# Patient Record
Sex: FEMALE | Race: WHITE | NOT HISPANIC OR LATINO | Employment: UNEMPLOYED | URBAN - METROPOLITAN AREA
[De-identification: names, ages, dates, MRNs, and addresses within clinical notes are randomized per-mention and may not be internally consistent; named-entity substitution may affect disease eponyms.]

---

## 2017-01-23 ENCOUNTER — APPOINTMENT (OUTPATIENT)
Dept: PREADMISSION TESTING | Facility: HOSPITAL | Age: 32
End: 2017-01-23

## 2017-01-23 VITALS — HEIGHT: 66 IN | BODY MASS INDEX: 33.94 KG/M2 | WEIGHT: 211.2 LBS

## 2017-01-23 LAB
ANION GAP SERPL CALCULATED.3IONS-SCNC: 9 MMOL/L (ref 3–11)
BUN BLD-MCNC: 18 MG/DL (ref 9–23)
BUN/CREAT SERPL: 30 (ref 7–25)
CALCIUM SPEC-SCNC: 9.2 MG/DL (ref 8.7–10.4)
CHLORIDE SERPL-SCNC: 106 MMOL/L (ref 99–109)
CO2 SERPL-SCNC: 32 MMOL/L (ref 20–31)
CREAT BLD-MCNC: 0.6 MG/DL (ref 0.6–1.3)
DEPRECATED RDW RBC AUTO: 43.4 FL (ref 37–54)
ERYTHROCYTE [DISTWIDTH] IN BLOOD BY AUTOMATED COUNT: 13.1 % (ref 11.3–14.5)
GFR SERPL CREATININE-BSD FRML MDRD: 116 ML/MIN/1.73
GLUCOSE BLD-MCNC: 94 MG/DL (ref 70–100)
HCT VFR BLD AUTO: 39.8 % (ref 34.5–44)
HGB BLD-MCNC: 14.1 G/DL (ref 11.5–15.5)
MAGNESIUM SERPL-MCNC: 1.7 MG/DL (ref 1.3–2.7)
MCH RBC QN AUTO: 32.3 PG (ref 27–31)
MCHC RBC AUTO-ENTMCNC: 35.4 G/DL (ref 32–36)
MCV RBC AUTO: 91.1 FL (ref 80–99)
PHOSPHATE SERPL-MCNC: 3.7 MG/DL (ref 2.4–5.1)
PLATELET # BLD AUTO: 183 10*3/MM3 (ref 150–450)
PMV BLD AUTO: 9.2 FL (ref 6–12)
POTASSIUM BLD-SCNC: 4.1 MMOL/L (ref 3.5–5.5)
PREALB SERPL-MCNC: 22.8 MG/DL (ref 10–40)
RBC # BLD AUTO: 4.37 10*6/MM3 (ref 3.89–5.14)
SODIUM BLD-SCNC: 147 MMOL/L (ref 132–146)
WBC NRBC COR # BLD: 4.03 10*3/MM3 (ref 3.5–10.8)

## 2017-01-23 PROCEDURE — 36415 COLL VENOUS BLD VENIPUNCTURE: CPT

## 2017-01-23 PROCEDURE — 85027 COMPLETE CBC AUTOMATED: CPT | Performed by: PLASTIC SURGERY

## 2017-01-23 PROCEDURE — 83735 ASSAY OF MAGNESIUM: CPT | Performed by: PLASTIC SURGERY

## 2017-01-23 PROCEDURE — 80048 BASIC METABOLIC PNL TOTAL CA: CPT | Performed by: PLASTIC SURGERY

## 2017-01-23 PROCEDURE — 84134 ASSAY OF PREALBUMIN: CPT | Performed by: PLASTIC SURGERY

## 2017-01-23 PROCEDURE — 84100 ASSAY OF PHOSPHORUS: CPT | Performed by: PLASTIC SURGERY

## 2017-01-25 ENCOUNTER — ANESTHESIA EVENT (OUTPATIENT)
Dept: PERIOP | Facility: HOSPITAL | Age: 32
End: 2017-01-25

## 2017-01-26 ENCOUNTER — ANESTHESIA (OUTPATIENT)
Dept: PERIOP | Facility: HOSPITAL | Age: 32
End: 2017-01-26

## 2017-01-26 PROBLEM — Z41.1 ENCOUNTER FOR SURGERY FOR COSMETIC DEFORMITY: Status: ACTIVE | Noted: 2017-01-26

## 2017-01-26 PROCEDURE — 25010000002 DEXAMETHASONE PER 1 MG: Performed by: NURSE ANESTHETIST, CERTIFIED REGISTERED

## 2017-01-26 PROCEDURE — 25010000002 PROPOFOL 10 MG/ML EMULSION: Performed by: NURSE ANESTHETIST, CERTIFIED REGISTERED

## 2017-01-26 PROCEDURE — 25010000003 CEFAZOLIN IN DEXTROSE 2-4 GM/100ML-% SOLUTION: Performed by: PLASTIC SURGERY

## 2017-01-26 PROCEDURE — 25010000002 ONDANSETRON PER 1 MG: Performed by: NURSE ANESTHETIST, CERTIFIED REGISTERED

## 2017-01-26 PROCEDURE — 25010000002 HYDROMORPHONE PER 4 MG: Performed by: NURSE ANESTHETIST, CERTIFIED REGISTERED

## 2017-01-26 PROCEDURE — 25010000002 FENTANYL CITRATE (PF) 100 MCG/2ML SOLUTION: Performed by: NURSE ANESTHETIST, CERTIFIED REGISTERED

## 2017-01-26 PROCEDURE — 25010000002 PROPOFOL 1000 MG/ML EMULSION: Performed by: NURSE ANESTHETIST, CERTIFIED REGISTERED

## 2017-01-26 RX ORDER — ROCURONIUM BROMIDE 10 MG/ML
INJECTION, SOLUTION INTRAVENOUS AS NEEDED
Status: DISCONTINUED | OUTPATIENT
Start: 2017-01-26 | End: 2017-01-26 | Stop reason: SURG

## 2017-01-26 RX ORDER — FENTANYL CITRATE 50 UG/ML
INJECTION, SOLUTION INTRAMUSCULAR; INTRAVENOUS AS NEEDED
Status: DISCONTINUED | OUTPATIENT
Start: 2017-01-26 | End: 2017-01-26 | Stop reason: SURG

## 2017-01-26 RX ORDER — PROPOFOL 10 MG/ML
VIAL (ML) INTRAVENOUS AS NEEDED
Status: DISCONTINUED | OUTPATIENT
Start: 2017-01-26 | End: 2017-01-26 | Stop reason: SURG

## 2017-01-26 RX ORDER — LIDOCAINE HYDROCHLORIDE 10 MG/ML
INJECTION, SOLUTION EPIDURAL; INFILTRATION; INTRACAUDAL; PERINEURAL AS NEEDED
Status: DISCONTINUED | OUTPATIENT
Start: 2017-01-26 | End: 2017-01-26 | Stop reason: SURG

## 2017-01-26 RX ORDER — ONDANSETRON 2 MG/ML
INJECTION INTRAMUSCULAR; INTRAVENOUS AS NEEDED
Status: DISCONTINUED | OUTPATIENT
Start: 2017-01-26 | End: 2017-01-26 | Stop reason: SURG

## 2017-01-26 RX ORDER — HYDROMORPHONE HCL 110MG/55ML
PATIENT CONTROLLED ANALGESIA SYRINGE INTRAVENOUS AS NEEDED
Status: DISCONTINUED | OUTPATIENT
Start: 2017-01-26 | End: 2017-01-26 | Stop reason: SURG

## 2017-01-26 RX ORDER — DEXAMETHASONE SODIUM PHOSPHATE 4 MG/ML
INJECTION, SOLUTION INTRA-ARTICULAR; INTRALESIONAL; INTRAMUSCULAR; INTRAVENOUS; SOFT TISSUE AS NEEDED
Status: DISCONTINUED | OUTPATIENT
Start: 2017-01-26 | End: 2017-01-26 | Stop reason: SURG

## 2017-01-26 RX ADMIN — CEFAZOLIN SODIUM 2 G: 2 INJECTION, SOLUTION INTRAVENOUS at 10:42

## 2017-01-26 RX ADMIN — ROCURONIUM BROMIDE 20 MG: 10 INJECTION INTRAVENOUS at 08:37

## 2017-01-26 RX ADMIN — PROPOFOL 25 MCG/KG/MIN: 10 INJECTION, EMULSION INTRAVENOUS at 08:18

## 2017-01-26 RX ADMIN — HYDROMORPHONE HYDROCHLORIDE 0.25 MG: 2 INJECTION, SOLUTION INTRAMUSCULAR; INTRAVENOUS; SUBCUTANEOUS at 12:04

## 2017-01-26 RX ADMIN — SODIUM CHLORIDE, POTASSIUM CHLORIDE, SODIUM LACTATE AND CALCIUM CHLORIDE: 600; 310; 30; 20 INJECTION, SOLUTION INTRAVENOUS at 07:27

## 2017-01-26 RX ADMIN — ONDANSETRON 4 MG: 2 INJECTION INTRAMUSCULAR; INTRAVENOUS at 12:46

## 2017-01-26 RX ADMIN — HYDROMORPHONE HYDROCHLORIDE 0.25 MG: 2 INJECTION, SOLUTION INTRAMUSCULAR; INTRAVENOUS; SUBCUTANEOUS at 13:20

## 2017-01-26 RX ADMIN — FENTANYL CITRATE 100 MCG: 50 INJECTION, SOLUTION INTRAMUSCULAR; INTRAVENOUS at 08:36

## 2017-01-26 RX ADMIN — PROPOFOL 200 MG: 10 INJECTION, EMULSION INTRAVENOUS at 07:50

## 2017-01-26 RX ADMIN — HYDROMORPHONE HYDROCHLORIDE 0.5 MG: 2 INJECTION, SOLUTION INTRAMUSCULAR; INTRAVENOUS; SUBCUTANEOUS at 10:36

## 2017-01-26 RX ADMIN — SODIUM CHLORIDE, POTASSIUM CHLORIDE, SODIUM LACTATE AND CALCIUM CHLORIDE: 600; 310; 30; 20 INJECTION, SOLUTION INTRAVENOUS at 09:55

## 2017-01-26 RX ADMIN — FENTANYL CITRATE 100 MCG: 50 INJECTION, SOLUTION INTRAMUSCULAR; INTRAVENOUS at 07:50

## 2017-01-26 RX ADMIN — ROCURONIUM BROMIDE 50 MG: 10 INJECTION INTRAVENOUS at 07:50

## 2017-01-26 RX ADMIN — DEXAMETHASONE SODIUM PHOSPHATE 8 MG: 4 INJECTION, SOLUTION INTRAMUSCULAR; INTRAVENOUS at 08:21

## 2017-01-26 RX ADMIN — ROCURONIUM BROMIDE 20 MG: 10 INJECTION INTRAVENOUS at 10:08

## 2017-01-26 RX ADMIN — FENTANYL CITRATE 50 MCG: 50 INJECTION, SOLUTION INTRAMUSCULAR; INTRAVENOUS at 08:50

## 2017-01-26 RX ADMIN — CEFAZOLIN SODIUM 2 G: 2 INJECTION, SOLUTION INTRAVENOUS at 07:43

## 2017-01-26 RX ADMIN — LIDOCAINE HYDROCHLORIDE 100 MG: 10 INJECTION, SOLUTION EPIDURAL; INFILTRATION; INTRACAUDAL; PERINEURAL at 07:50

## 2017-01-26 NOTE — ANESTHESIA POSTPROCEDURE EVALUATION
Patient: Ely De La Garza    Procedure Summary     Date Anesthesia Start Anesthesia Stop Room / Location    01/26/17 0743   [facility] / [facility]       Procedure Diagnosis Surgeon Provider    CIRCUMFERENTIAL BODY LIFT (N/A ) No diagnosis on file. MD Brian Larkin MD          Anesthesia Type: general  Last vitals  BP      Temp      Pulse     Resp      SpO2        Post Anesthesia Care and Evaluation    Patient location during evaluation: PACU  Patient participation: complete - patient participated  Level of consciousness: awake and alert  Pain score: 2  Pain management: satisfactory to patient  Airway patency: patent  Anesthetic complications: No anesthetic complications  PONV Status: none  Cardiovascular status: hemodynamically stable and acceptable  Respiratory status: nonlabored ventilation, acceptable, nasal cannula and spontaneous ventilation  Hydration status: acceptable    Comments: /83 (BP Location: Right arm, Patient Position: Lying)  Pulse 79  Temp 97 °F (36.1 °C) (Temporal Artery )   Resp 16   SpO2 100%

## 2017-01-26 NOTE — ANESTHESIA PREPROCEDURE EVALUATION
Anesthesia Evaluation     Patient summary reviewed and Nursing notes reviewed    Airway   Mallampati: I  TM distance: >3 FB  Neck ROM: full  no difficulty expected  Dental      Pulmonary - negative pulmonary ROS   Cardiovascular - negative cardio ROS    Neuro/Psych- negative ROS  GI/Hepatic/Renal/Endo    (+) obesity,      Musculoskeletal (-) negative ROS    Abdominal    Substance History - negative use     OB/GYN negative ob/gyn ROS         Other                             Anesthesia Plan    ASA 2     general     intravenous induction   Anesthetic plan and risks discussed with patient.    Plan discussed with CRNA.

## 2017-01-26 NOTE — ANESTHESIA PROCEDURE NOTES
Airway  Urgency: elective    Airway not difficult    General Information and Staff    Patient location during procedure: OR  Anesthesiologist: MIRIAM GRAF  CRNA: NATHALIE OSBORN    Indications and Patient Condition  Indications for airway management: airway protection    Preoxygenated: yes  MILS not maintained throughout  Mask difficulty assessment: 2 - vent by mask + OA or adjuvant +/- NMBA    Final Airway Details  Final airway type: endotracheal airway      Successful airway: ETT  Cuffed: yes   Successful intubation technique: direct laryngoscopy  Endotracheal tube insertion site: oral  Blade: Eusebio  Blade size: #3  ETT size: 7.0 mm  Cormack-Lehane Classification: grade IIb - view of arytenoids or posterior of glottis only  Placement verified by: chest auscultation and capnometry   Measured from: lips  ETT to lips (cm): 22  Number of attempts at approach: 1    Additional Comments  Negative epigastric sounds, Breath sound equal bilaterally with symmetric chest rise and fall

## 2019-02-04 ENCOUNTER — APPOINTMENT (OUTPATIENT)
Dept: PREADMISSION TESTING | Facility: HOSPITAL | Age: 34
End: 2019-02-04

## 2019-02-04 VITALS — HEIGHT: 66 IN | WEIGHT: 245.37 LBS | BODY MASS INDEX: 39.43 KG/M2

## 2019-02-04 LAB
ANION GAP SERPL CALCULATED.3IONS-SCNC: 6 MMOL/L (ref 3–11)
BUN BLD-MCNC: 14 MG/DL (ref 9–23)
BUN/CREAT SERPL: 19.2 (ref 7–25)
CALCIUM SPEC-SCNC: 9 MG/DL (ref 8.7–10.4)
CHLORIDE SERPL-SCNC: 104 MMOL/L (ref 99–109)
CO2 SERPL-SCNC: 30 MMOL/L (ref 20–31)
CREAT BLD-MCNC: 0.73 MG/DL (ref 0.6–1.3)
DEPRECATED RDW RBC AUTO: 39.5 FL (ref 37–54)
ERYTHROCYTE [DISTWIDTH] IN BLOOD BY AUTOMATED COUNT: 12.3 % (ref 11.3–14.5)
GFR SERPL CREATININE-BSD FRML MDRD: 91 ML/MIN/1.73
GLUCOSE BLD-MCNC: 94 MG/DL (ref 70–100)
HCT VFR BLD AUTO: 43.5 % (ref 34.5–44)
HGB BLD-MCNC: 14.9 G/DL (ref 11.5–15.5)
MCH RBC QN AUTO: 30.5 PG (ref 27–31)
MCHC RBC AUTO-ENTMCNC: 34.3 G/DL (ref 32–36)
MCV RBC AUTO: 89 FL (ref 80–99)
PLATELET # BLD AUTO: 200 10*3/MM3 (ref 150–450)
PMV BLD AUTO: 9.5 FL (ref 6–12)
POTASSIUM BLD-SCNC: 4 MMOL/L (ref 3.5–5.5)
PREALB SERPL-MCNC: 27.3 MG/DL (ref 10–40)
RBC # BLD AUTO: 4.89 10*6/MM3 (ref 3.89–5.14)
SODIUM BLD-SCNC: 140 MMOL/L (ref 132–146)
WBC NRBC COR # BLD: 4.86 10*3/MM3 (ref 3.5–10.8)

## 2019-02-04 PROCEDURE — 80048 BASIC METABOLIC PNL TOTAL CA: CPT | Performed by: PLASTIC SURGERY

## 2019-02-04 PROCEDURE — 36415 COLL VENOUS BLD VENIPUNCTURE: CPT

## 2019-02-04 PROCEDURE — 93005 ELECTROCARDIOGRAM TRACING: CPT

## 2019-02-04 PROCEDURE — 85027 COMPLETE CBC AUTOMATED: CPT | Performed by: PLASTIC SURGERY

## 2019-02-04 PROCEDURE — 93010 ELECTROCARDIOGRAM REPORT: CPT | Performed by: INTERNAL MEDICINE

## 2019-02-04 PROCEDURE — 84134 ASSAY OF PREALBUMIN: CPT | Performed by: PLASTIC SURGERY

## 2019-02-13 ENCOUNTER — ANESTHESIA EVENT (OUTPATIENT)
Dept: PERIOP | Facility: HOSPITAL | Age: 34
End: 2019-02-13

## 2019-02-13 RX ORDER — FAMOTIDINE 10 MG/ML
20 INJECTION, SOLUTION INTRAVENOUS ONCE
Status: CANCELLED | OUTPATIENT
Start: 2019-02-13 | End: 2019-02-13

## 2019-02-13 RX ORDER — SODIUM CHLORIDE 0.9 % (FLUSH) 0.9 %
3-10 SYRINGE (ML) INJECTION AS NEEDED
Status: CANCELLED | OUTPATIENT
Start: 2019-02-13

## 2019-02-13 RX ORDER — SODIUM CHLORIDE 0.9 % (FLUSH) 0.9 %
3 SYRINGE (ML) INJECTION EVERY 12 HOURS SCHEDULED
Status: CANCELLED | OUTPATIENT
Start: 2019-02-13

## 2019-02-14 ENCOUNTER — ANESTHESIA (OUTPATIENT)
Dept: PERIOP | Facility: HOSPITAL | Age: 34
End: 2019-02-14

## 2019-02-14 ENCOUNTER — APPOINTMENT (OUTPATIENT)
Dept: GENERAL RADIOLOGY | Facility: HOSPITAL | Age: 34
End: 2019-02-14

## 2019-02-14 ENCOUNTER — HOSPITAL ENCOUNTER (OUTPATIENT)
Facility: HOSPITAL | Age: 34
Discharge: HOME | End: 2019-02-15
Attending: PLASTIC SURGERY | Admitting: PLASTIC SURGERY

## 2019-02-14 PROBLEM — Z41.1 ENCOUNTER FOR COSMETIC SURGERY: Status: ACTIVE | Noted: 2019-02-14

## 2019-02-14 LAB
B-HCG UR QL: NEGATIVE
INTERNAL NEGATIVE CONTROL: NEGATIVE
INTERNAL POSITIVE CONTROL: POSITIVE
Lab: NORMAL

## 2019-02-14 PROCEDURE — 25010000002 FENTANYL CITRATE (PF) 100 MCG/2ML SOLUTION: Performed by: NURSE ANESTHETIST, CERTIFIED REGISTERED

## 2019-02-14 PROCEDURE — 25010000002 NEOSTIGMINE 10 MG/10ML SOLUTION: Performed by: NURSE ANESTHETIST, CERTIFIED REGISTERED

## 2019-02-14 PROCEDURE — 25010000003 CEFAZOLIN IN DEXTROSE 2-4 GM/100ML-% SOLUTION: Performed by: PLASTIC SURGERY

## 2019-02-14 PROCEDURE — 25010000002 HYDROMORPHONE PER 4 MG: Performed by: NURSE ANESTHETIST, CERTIFIED REGISTERED

## 2019-02-14 PROCEDURE — 25010000002 ONDANSETRON PER 1 MG: Performed by: NURSE ANESTHETIST, CERTIFIED REGISTERED

## 2019-02-14 PROCEDURE — 25010000002 BUPRENORPHINE PER 0.1 MG: Performed by: ANESTHESIOLOGY

## 2019-02-14 PROCEDURE — 25010000002 PROPOFOL 10 MG/ML EMULSION: Performed by: NURSE ANESTHETIST, CERTIFIED REGISTERED

## 2019-02-14 PROCEDURE — 81025 URINE PREGNANCY TEST: CPT | Performed by: ANESTHESIOLOGY

## 2019-02-14 PROCEDURE — 25010000002 PROMETHAZINE PER 50 MG: Performed by: NURSE ANESTHETIST, CERTIFIED REGISTERED

## 2019-02-14 PROCEDURE — 25010000002 MORPHINE PER 10 MG: Performed by: PLASTIC SURGERY

## 2019-02-14 PROCEDURE — 25010000002 DEXAMETHASONE SODIUM PHOSPHATE 10 MG/ML SOLUTION: Performed by: ANESTHESIOLOGY

## 2019-02-14 PROCEDURE — 25010000002 EPINEPHRINE PER 0.1 MG: Performed by: PLASTIC SURGERY

## 2019-02-14 RX ORDER — MEPERIDINE HYDROCHLORIDE 25 MG/ML
12.5 INJECTION INTRAMUSCULAR; INTRAVENOUS; SUBCUTANEOUS
Status: COMPLETED | OUTPATIENT
Start: 2019-02-14 | End: 2019-02-14

## 2019-02-14 RX ORDER — CEFAZOLIN SODIUM 2 G/100ML
2 INJECTION, SOLUTION INTRAVENOUS EVERY 8 HOURS
Status: COMPLETED | OUTPATIENT
Start: 2019-02-14 | End: 2019-02-15

## 2019-02-14 RX ORDER — LIDOCAINE HYDROCHLORIDE 10 MG/ML
INJECTION, SOLUTION EPIDURAL; INFILTRATION; INTRACAUDAL; PERINEURAL AS NEEDED
Status: DISCONTINUED | OUTPATIENT
Start: 2019-02-14 | End: 2019-02-14 | Stop reason: SURG

## 2019-02-14 RX ORDER — PROMETHAZINE HYDROCHLORIDE 25 MG/ML
6.25 INJECTION, SOLUTION INTRAMUSCULAR; INTRAVENOUS ONCE AS NEEDED
Status: COMPLETED | OUTPATIENT
Start: 2019-02-14 | End: 2019-02-14

## 2019-02-14 RX ORDER — PROPOFOL 10 MG/ML
VIAL (ML) INTRAVENOUS AS NEEDED
Status: DISCONTINUED | OUTPATIENT
Start: 2019-02-14 | End: 2019-02-14 | Stop reason: SURG

## 2019-02-14 RX ORDER — FENTANYL CITRATE 50 UG/ML
INJECTION, SOLUTION INTRAMUSCULAR; INTRAVENOUS AS NEEDED
Status: DISCONTINUED | OUTPATIENT
Start: 2019-02-14 | End: 2019-02-14 | Stop reason: SURG

## 2019-02-14 RX ORDER — BUPRENORPHINE HYDROCHLORIDE 0.32 MG/ML
INJECTION INTRAMUSCULAR; INTRAVENOUS
Status: COMPLETED | OUTPATIENT
Start: 2019-02-14 | End: 2019-02-14

## 2019-02-14 RX ORDER — LIDOCAINE HYDROCHLORIDE 10 MG/ML
0.5 INJECTION, SOLUTION EPIDURAL; INFILTRATION; INTRACAUDAL; PERINEURAL ONCE AS NEEDED
Status: COMPLETED | OUTPATIENT
Start: 2019-02-14 | End: 2019-02-14

## 2019-02-14 RX ORDER — ONDANSETRON 2 MG/ML
4 INJECTION INTRAMUSCULAR; INTRAVENOUS EVERY 6 HOURS PRN
Status: DISCONTINUED | OUTPATIENT
Start: 2019-02-14 | End: 2019-02-15 | Stop reason: HOSPADM

## 2019-02-14 RX ORDER — HYDROMORPHONE HYDROCHLORIDE 1 MG/ML
0.5 INJECTION, SOLUTION INTRAMUSCULAR; INTRAVENOUS; SUBCUTANEOUS
Status: COMPLETED | OUTPATIENT
Start: 2019-02-14 | End: 2019-02-14

## 2019-02-14 RX ORDER — BUPIVACAINE HYDROCHLORIDE 2.5 MG/ML
INJECTION, SOLUTION EPIDURAL; INFILTRATION; INTRACAUDAL
Status: COMPLETED | OUTPATIENT
Start: 2019-02-14 | End: 2019-02-14

## 2019-02-14 RX ORDER — SCOLOPAMINE TRANSDERMAL SYSTEM 1 MG/1
1 PATCH, EXTENDED RELEASE TRANSDERMAL ONCE
Status: DISCONTINUED | OUTPATIENT
Start: 2019-02-14 | End: 2019-02-14

## 2019-02-14 RX ORDER — LABETALOL HYDROCHLORIDE 5 MG/ML
5 INJECTION, SOLUTION INTRAVENOUS
Status: DISCONTINUED | OUTPATIENT
Start: 2019-02-14 | End: 2019-02-14 | Stop reason: HOSPADM

## 2019-02-14 RX ORDER — CEFAZOLIN SODIUM 2 G/100ML
2 INJECTION, SOLUTION INTRAVENOUS EVERY 8 HOURS
Status: DISCONTINUED | OUTPATIENT
Start: 2019-02-14 | End: 2019-02-14

## 2019-02-14 RX ORDER — MAGNESIUM HYDROXIDE 1200 MG/15ML
LIQUID ORAL AS NEEDED
Status: DISCONTINUED | OUTPATIENT
Start: 2019-02-14 | End: 2019-02-14 | Stop reason: HOSPADM

## 2019-02-14 RX ORDER — ONDANSETRON 4 MG/1
4 TABLET, FILM COATED ORAL EVERY 6 HOURS PRN
Status: DISCONTINUED | OUTPATIENT
Start: 2019-02-14 | End: 2019-02-15 | Stop reason: HOSPADM

## 2019-02-14 RX ORDER — OXYCODONE AND ACETAMINOPHEN 7.5; 325 MG/1; MG/1
1 TABLET ORAL EVERY 4 HOURS PRN
Status: DISCONTINUED | OUTPATIENT
Start: 2019-02-14 | End: 2019-02-15 | Stop reason: HOSPADM

## 2019-02-14 RX ORDER — SODIUM CHLORIDE, SODIUM LACTATE, POTASSIUM CHLORIDE, CALCIUM CHLORIDE 600; 310; 30; 20 MG/100ML; MG/100ML; MG/100ML; MG/100ML
9 INJECTION, SOLUTION INTRAVENOUS CONTINUOUS
Status: DISCONTINUED | OUTPATIENT
Start: 2019-02-14 | End: 2019-02-14

## 2019-02-14 RX ORDER — FENTANYL CITRATE 50 UG/ML
50 INJECTION, SOLUTION INTRAMUSCULAR; INTRAVENOUS
Status: DISCONTINUED | OUTPATIENT
Start: 2019-02-14 | End: 2019-02-14 | Stop reason: HOSPADM

## 2019-02-14 RX ORDER — SODIUM CHLORIDE 9 MG/ML
75 INJECTION, SOLUTION INTRAVENOUS CONTINUOUS
Status: DISCONTINUED | OUTPATIENT
Start: 2019-02-14 | End: 2019-02-15 | Stop reason: HOSPADM

## 2019-02-14 RX ORDER — ATRACURIUM BESYLATE 10 MG/ML
INJECTION, SOLUTION INTRAVENOUS AS NEEDED
Status: DISCONTINUED | OUTPATIENT
Start: 2019-02-14 | End: 2019-02-14 | Stop reason: SURG

## 2019-02-14 RX ORDER — PROPOFOL 10 MG/ML
VIAL (ML) INTRAVENOUS CONTINUOUS PRN
Status: DISCONTINUED | OUTPATIENT
Start: 2019-02-14 | End: 2019-02-14 | Stop reason: SURG

## 2019-02-14 RX ORDER — GLYCOPYRROLATE 0.2 MG/ML
INJECTION INTRAMUSCULAR; INTRAVENOUS AS NEEDED
Status: DISCONTINUED | OUTPATIENT
Start: 2019-02-14 | End: 2019-02-14 | Stop reason: SURG

## 2019-02-14 RX ORDER — BISACODYL 5 MG/1
5 TABLET, DELAYED RELEASE ORAL DAILY PRN
Status: DISCONTINUED | OUTPATIENT
Start: 2019-02-14 | End: 2019-02-15 | Stop reason: HOSPADM

## 2019-02-14 RX ORDER — PROMETHAZINE HYDROCHLORIDE 25 MG/1
25 SUPPOSITORY RECTAL ONCE AS NEEDED
Status: COMPLETED | OUTPATIENT
Start: 2019-02-14 | End: 2019-02-14

## 2019-02-14 RX ORDER — DEXAMETHASONE SODIUM PHOSPHATE 10 MG/ML
INJECTION, SOLUTION INTRAMUSCULAR; INTRAVENOUS
Status: COMPLETED | OUTPATIENT
Start: 2019-02-14 | End: 2019-02-14

## 2019-02-14 RX ORDER — ONDANSETRON 2 MG/ML
INJECTION INTRAMUSCULAR; INTRAVENOUS AS NEEDED
Status: DISCONTINUED | OUTPATIENT
Start: 2019-02-14 | End: 2019-02-14 | Stop reason: SURG

## 2019-02-14 RX ORDER — MORPHINE SULFATE 4 MG/ML
6 INJECTION, SOLUTION INTRAMUSCULAR; INTRAVENOUS
Status: DISCONTINUED | OUTPATIENT
Start: 2019-02-14 | End: 2019-02-15 | Stop reason: HOSPADM

## 2019-02-14 RX ORDER — ONDANSETRON 2 MG/ML
4 INJECTION INTRAMUSCULAR; INTRAVENOUS ONCE AS NEEDED
Status: DISCONTINUED | OUTPATIENT
Start: 2019-02-14 | End: 2019-02-14 | Stop reason: HOSPADM

## 2019-02-14 RX ORDER — NEOSTIGMINE METHYLSULFATE 1 MG/ML
INJECTION, SOLUTION INTRAVENOUS AS NEEDED
Status: DISCONTINUED | OUTPATIENT
Start: 2019-02-14 | End: 2019-02-14 | Stop reason: SURG

## 2019-02-14 RX ORDER — CEFAZOLIN SODIUM 2 G/100ML
2 INJECTION, SOLUTION INTRAVENOUS ONCE
Status: COMPLETED | OUTPATIENT
Start: 2019-02-14 | End: 2019-02-14

## 2019-02-14 RX ORDER — FAMOTIDINE 20 MG/1
20 TABLET, FILM COATED ORAL ONCE
Status: COMPLETED | OUTPATIENT
Start: 2019-02-14 | End: 2019-02-14

## 2019-02-14 RX ORDER — BISACODYL 10 MG
10 SUPPOSITORY, RECTAL RECTAL DAILY PRN
Status: DISCONTINUED | OUTPATIENT
Start: 2019-02-14 | End: 2019-02-15 | Stop reason: HOSPADM

## 2019-02-14 RX ORDER — NALOXONE HCL 0.4 MG/ML
0.4 VIAL (ML) INJECTION
Status: DISCONTINUED | OUTPATIENT
Start: 2019-02-14 | End: 2019-02-15 | Stop reason: HOSPADM

## 2019-02-14 RX ORDER — PROMETHAZINE HYDROCHLORIDE 25 MG/1
25 TABLET ORAL ONCE AS NEEDED
Status: COMPLETED | OUTPATIENT
Start: 2019-02-14 | End: 2019-02-14

## 2019-02-14 RX ORDER — TEMAZEPAM 7.5 MG/1
7.5 CAPSULE ORAL NIGHTLY PRN
Status: DISCONTINUED | OUTPATIENT
Start: 2019-02-14 | End: 2019-02-15 | Stop reason: HOSPADM

## 2019-02-14 RX ADMIN — NEOSTIGMINE METHYLSULFATE 2.5 MG: 1 INJECTION, SOLUTION INTRAVENOUS at 10:59

## 2019-02-14 RX ADMIN — ONDANSETRON 4 MG: 2 INJECTION INTRAMUSCULAR; INTRAVENOUS at 10:59

## 2019-02-14 RX ADMIN — MEPERIDINE HYDROCHLORIDE 12.5 MG: 25 INJECTION INTRAMUSCULAR; INTRAVENOUS; SUBCUTANEOUS at 13:00

## 2019-02-14 RX ADMIN — FENTANYL CITRATE 50 MCG: 50 INJECTION INTRAMUSCULAR; INTRAVENOUS at 11:55

## 2019-02-14 RX ADMIN — HYDROMORPHONE HYDROCHLORIDE 0.5 MG: 1 INJECTION, SOLUTION INTRAMUSCULAR; INTRAVENOUS; SUBCUTANEOUS at 11:25

## 2019-02-14 RX ADMIN — HYDROMORPHONE HYDROCHLORIDE 0.5 MG: 1 INJECTION, SOLUTION INTRAMUSCULAR; INTRAVENOUS; SUBCUTANEOUS at 11:35

## 2019-02-14 RX ADMIN — CEFAZOLIN SODIUM 2 G: 2 INJECTION, SOLUTION INTRAVENOUS at 22:03

## 2019-02-14 RX ADMIN — ATRACURIUM BESYLATE 50 MG: 10 INJECTION, SOLUTION INTRAVENOUS at 07:29

## 2019-02-14 RX ADMIN — PROPOFOL 25 MCG/KG/MIN: 10 INJECTION, EMULSION INTRAVENOUS at 07:45

## 2019-02-14 RX ADMIN — BUPRENORPHINE HYDROCHLORIDE 0.3 MG: 0.32 INJECTION INTRAMUSCULAR; INTRAVENOUS at 07:32

## 2019-02-14 RX ADMIN — BUPIVACAINE HYDROCHLORIDE 60 ML: 2.5 INJECTION, SOLUTION EPIDURAL; INFILTRATION; INTRACAUDAL; PERINEURAL at 07:32

## 2019-02-14 RX ADMIN — DEXAMETHASONE SODIUM PHOSPHATE 6 MG: 10 INJECTION, SOLUTION INTRAMUSCULAR; INTRAVENOUS at 07:40

## 2019-02-14 RX ADMIN — PROPOFOL 200 MG: 10 INJECTION, EMULSION INTRAVENOUS at 07:29

## 2019-02-14 RX ADMIN — MORPHINE SULFATE 6 MG: 4 INJECTION INTRAVENOUS at 20:54

## 2019-02-14 RX ADMIN — FAMOTIDINE 20 MG: 20 TABLET, FILM COATED ORAL at 06:56

## 2019-02-14 RX ADMIN — MEPERIDINE HYDROCHLORIDE 12.5 MG: 25 INJECTION INTRAMUSCULAR; INTRAVENOUS; SUBCUTANEOUS at 13:10

## 2019-02-14 RX ADMIN — CEFAZOLIN SODIUM 2 G: 2 INJECTION, SOLUTION INTRAVENOUS at 07:26

## 2019-02-14 RX ADMIN — HYDROMORPHONE HYDROCHLORIDE 0.5 MG: 1 INJECTION, SOLUTION INTRAMUSCULAR; INTRAVENOUS; SUBCUTANEOUS at 11:40

## 2019-02-14 RX ADMIN — FENTANYL CITRATE 50 MCG: 50 INJECTION INTRAMUSCULAR; INTRAVENOUS at 12:25

## 2019-02-14 RX ADMIN — GLYCOPYRROLATE 0.3 MG: 0.2 INJECTION, SOLUTION INTRAMUSCULAR; INTRAVENOUS at 10:59

## 2019-02-14 RX ADMIN — SODIUM CHLORIDE, POTASSIUM CHLORIDE, SODIUM LACTATE AND CALCIUM CHLORIDE 9 ML/HR: 600; 310; 30; 20 INJECTION, SOLUTION INTRAVENOUS at 06:52

## 2019-02-14 RX ADMIN — SODIUM CHLORIDE 75 ML/HR: 9 INJECTION, SOLUTION INTRAVENOUS at 12:30

## 2019-02-14 RX ADMIN — HYDROMORPHONE HYDROCHLORIDE 0.5 MG: 1 INJECTION, SOLUTION INTRAMUSCULAR; INTRAVENOUS; SUBCUTANEOUS at 11:30

## 2019-02-14 RX ADMIN — LIDOCAINE HYDROCHLORIDE 50 MG: 10 INJECTION, SOLUTION EPIDURAL; INFILTRATION; INTRACAUDAL; PERINEURAL at 07:29

## 2019-02-14 RX ADMIN — CEFAZOLIN SODIUM 2 G: 2 INJECTION, SOLUTION INTRAVENOUS at 16:12

## 2019-02-14 RX ADMIN — LIDOCAINE HYDROCHLORIDE 0.5 ML: 10 INJECTION, SOLUTION EPIDURAL; INFILTRATION; INTRACAUDAL; PERINEURAL at 06:52

## 2019-02-14 RX ADMIN — MORPHINE SULFATE 6 MG: 4 INJECTION INTRAVENOUS at 15:32

## 2019-02-14 RX ADMIN — MORPHINE SULFATE 6 MG: 4 INJECTION INTRAVENOUS at 13:48

## 2019-02-14 RX ADMIN — FENTANYL CITRATE 50 MCG: 50 INJECTION INTRAMUSCULAR; INTRAVENOUS at 11:45

## 2019-02-14 RX ADMIN — SCOPALAMINE 1 PATCH: 1 PATCH, EXTENDED RELEASE TRANSDERMAL at 07:00

## 2019-02-14 RX ADMIN — DEXAMETHASONE SODIUM PHOSPHATE 4 MG: 10 INJECTION, SOLUTION INTRAMUSCULAR; INTRAVENOUS at 07:32

## 2019-02-14 RX ADMIN — SODIUM CHLORIDE, POTASSIUM CHLORIDE, SODIUM LACTATE AND CALCIUM CHLORIDE: 600; 310; 30; 20 INJECTION, SOLUTION INTRAVENOUS at 10:45

## 2019-02-14 RX ADMIN — OXYCODONE HYDROCHLORIDE AND ACETAMINOPHEN 1 TABLET: 7.5; 325 TABLET ORAL at 12:40

## 2019-02-14 RX ADMIN — PROMETHAZINE HYDROCHLORIDE 6.25 MG: 25 INJECTION INTRAMUSCULAR; INTRAVENOUS at 13:45

## 2019-02-14 RX ADMIN — FENTANYL CITRATE 100 MCG: 50 INJECTION, SOLUTION INTRAMUSCULAR; INTRAVENOUS at 07:29

## 2019-02-14 RX ADMIN — FENTANYL CITRATE 50 MCG: 50 INJECTION INTRAMUSCULAR; INTRAVENOUS at 12:35

## 2019-02-14 NOTE — OP NOTE
Preoperative diagnosis: 1.  Excess skin and subcutaneous tissue of her thighs  2.  Excess subcutaneous tissue of her abdomen    Postoperative diagnosis: 1. Excess skin and subcutaneous tissue of her thighs  2.  Excess subcutaneous tissue of her abdomen    Surgeon: 1.  Enrique Leroy M.D.  2.  Carson William M.D.    Asst.: Aure RAND    Anesthesia: General    Procedure: 1.  Bilateral thigh lift  2.  Liposuction abdomen and flanks    Indication: The patient is a 33-year-old white female who lost approximately 200 pounds through diet and exercise.  She presented to my office desiring body contouring.  We have undertaken a circumferential body lift, thigh liposuction, breast augmentation and mastopexy, upper body lift.  She is now ready for a thigh lift.  The technique potential convocation's and typical postoperative course were discussed with the patient which indicated her understanding and wished proceed.    Findings: 1.  Excess skin and subcutaneous taste tissues of her lateral thighs  2.  Excess subcutaneous taste tissue of her abdomen and flanks    Description: The patient was taken from preoperative holding to the operating room after informed consent was signed and on the chart and placed under general anesthesia successfully.  Prior to induction of anesthesia, the patient received a prophylactic dose of antibiotics and had bilateral lower sequential compression devices in place and operational.  She was placed supine on the operating table.  She had a pillow placed beneath her knees.  Her arms are gently abducted to 90° and she had ulnar nerve padding.  Her abdomen, flanks, hips, and thighs were prepped and draped in the usual sterile fashion.  After properly identifying the patient and patient's problem, her bilateral vertically oriented by lift incisions were tailor tacked with staples.  This was marked with a marking pen.  The staples were released.  The vertically oriented elliptical incision was  reinforced with a marking pen.  The incision was injected with tumescent solution consisting of 1 L of LR and 1 ampule of appendectomy.  Simultaneously, a 10 blade was used to incise skin according to our marks.  In the groin region, a plane was developed just deep to the skin surface.  More distally, the planned dissection was carried out just deep to superficial fascia.  At all times, a layer of subcutaneous fat was left over the deep fascia.  Hemostasis was achieved with electrocautery.  A 15 Ukrainian Thomas drain was placed in the subcutaneous space, brought out the lateral thigh and secured with 3-0 nylon suture in a standard fashion.  The incision was closed with 2-0 Vicryl suture in a simple interrupted fashion at the level of the superficial fascia.  The skin was closed with 3-0 Monocryl suture in a deep dermal buried interrupted fashion for Monocryl suture in an intracuticular fashion.  Skin affixed tissue glue was applied.  The patient's abdomen was then addressed.  Bilateral lower quadrant stab incisions were made with a 15 blade.  Tumescent solution was injected into her abdomen and flanks in the areas of concern.  A 3.7 mm accelerator cannula was then used to harvest fat.  1.5 mL of tumescent was utilized and 900 cc of labral aspirate was harvested.  The fat was equalized at the surgical site.  The access sites were closed with 5-0 fast-absorbing plain gut suture.  2 x 2 covered up applied in these regions.  The patient's legs were wrapped with Kerlix and an Ace wrap.  The patient had an abdominal binder applied.  The case turned over to anesthesia which point the patient was awoken from general anesthesia successfully and taken to PACU in stable condition.  I was present for the entire procedure.  All counts were correct.    Estimated blood loss: Minimal    Drains: 2    Palpitations: None immediate

## 2019-02-14 NOTE — ANESTHESIA PREPROCEDURE EVALUATION
Anesthesia Evaluation     Patient summary reviewed and Nursing notes reviewed   history of anesthetic complications: PONV  NPO Solid Status: > 8 hours  NPO Liquid Status: > 8 hours           Airway   Mallampati: II  TM distance: >3 FB  Neck ROM: full  No difficulty expected  Dental - normal exam     Pulmonary - negative pulmonary ROS and normal exam    breath sounds clear to auscultation  Cardiovascular - negative cardio ROS and normal exam    ECG reviewed  Rhythm: regular  Rate: normal        Neuro/Psych- negative ROS  GI/Hepatic/Renal/Endo    (+) morbid obesity,      Musculoskeletal (-) negative ROS    Abdominal    Substance History - negative use     OB/GYN          Other                        Anesthesia Plan    ASA 3     general   (Bilateral TAP blocks post-induction for post-operative analgesia per request of Dr. Leroy.  Discussed payment with pt, pt understands TAP blocks will be an added cost.)  intravenous induction   Anesthetic plan, all risks, benefits, and alternatives have been provided, discussed and informed consent has been obtained with: patient.    Plan discussed with CRNA.

## 2019-02-14 NOTE — BRIEF OP NOTE
BODY LIFT, LIPOSUCTION ABDOMINAL  Progress Note    Ely De La Garza  2/14/2019    Pre-op Diagnosis:   * Encounter for cosmetic surgery [Z41.1]       Post-Op Diagnosis Codes:     * Encounter for cosmetic surgery [Z41.1]    Procedure/CPT® Codes:  RI EXCISE EXCESS SKIN TISSUE,THIGH [84950]  RI SUCT DEMETRIA LIPECTOMY,TRUNK [98612]    Procedure(s):  BILATERAL THIGH  LIFT WITH LIPOSUCTION  LIPOSUCTION ABDOMINAL    Surgeon(s):  Enrique Leroy MD Lynch, Michael Paul, MD    Anesthesia: General    Staff:   Circulator: Ashley Ho RN  Scrub Person: Leah Torrez  Nursing Assistant: Kim Galindo  Assistant: Aure South CSA    Estimated Blood Loss: none    Urine Voided: * No values recorded between 2/14/2019  7:27 AM and 2/14/2019 11:15 AM *    Specimens:                None      Drains:   Closed/Suction Drain 1 Right  Bulb 15 Fr. (Active)   Status To bulb suction 2/14/2019 11:30 AM       Closed/Suction Drain 1 Left  Bulb 15 Fr. (Active)   Status To bulb suction 2/14/2019 11:30 AM       Findings: excess skin/subcutaneous tissue thigh    Complications: none immediate      Enrique Leroy MD     Date: 2/14/2019  Time: 11:45 AM

## 2019-02-14 NOTE — H&P
"Pre-Op H&P  Ely De La Garza  [id number]  1985    Chief complaint: Cosmetic surgery    HPI:    Patient is a 33 y.o.female who presents for cosmetic surgery and here to undergo bilateral thigh lift with liposuction, abdominal liposuction.  S/P breast augmentation/pexy, upper body lift, lower body lift and thigh liposuction.    Review of Systems:  General ROS: negative for chills, fever or skin lesions;  No changes since last office visit  Cardiovascular ROS: no chest pain or dyspnea on exertion  Respiratory ROS: no cough, shortness of breath, or wheezing    Allergies: No Known Allergies    Home Meds:    No current facility-administered medications on file prior to encounter.      Current Outpatient Medications on File Prior to Encounter   Medication Sig Dispense Refill   • levonorgestrel (MIRENA) 20 MCG/24HR IUD 1 each by Intrauterine route 1 (One) Time.     • Multiple Vitamins-Minerals (HAIR/SKIN/NAILS PO) Take 1 tablet by mouth Daily.         PMH:   Past Medical History:   Diagnosis Date   • Goiter     Pt states resolved   • Obese    • PONV (postoperative nausea and vomiting)    • Wears contact lenses    • Wears eyeglasses      PSH:    Past Surgical History:   Procedure Laterality Date   • BODY LIFT N/A 2017    Procedure: CIRCUMFERENTIAL BODY LIFT;  Surgeon: Enrique Leroy MD;  Location: [facility];  Service:    • BREAST AUGMENTATION     •  SECTION     • LUNG BIOPSY     • OTHER SURGICAL HISTORY      Back Lift   • TONSILLECTOMY         Social History:   Tobacco:   Social History     Tobacco Use   Smoking Status Former Smoker   • Types: Cigarettes   Smokeless Tobacco Never Used   Tobacco Comment    quit       Alcohol:     Social History     Substance and Sexual Activity   Alcohol Use No       Vitals:           /84 (BP Location: Right arm, Patient Position: Lying)   Pulse 80   Temp 97.8 °F (36.6 °C) (Temporal)   Resp 16   Ht 167.6 cm (66\")   Wt 111 kg (245 lb)   LMP 2019   " SpO2 97%   BMI 39.54 kg/m²     Physical Exam:  General Appearance:    Alert, cooperative, no distress, appears stated age   Head:    Normocephalic, without obvious abnormality, atraumatic   Lungs:     Clear to auscultation bilaterally, respirations unlabored    Heart:   Regular rate and rhythm, S1 and S2 normal, no murmur, rub    or gallop    Abdomen:    Soft, non-tender.  +bowel sounds   Breast Exam:    deferred   Genitalia:    deferred   Extremities:   Extremities normal, atraumatic, no cyanosis or edema   Skin:   Skin color, texture, turgor normal, no rashes or lesions   Neurologic:   Grossly intact   Results Review  I reviewed the patient's new clinical results.    Cancer Staging (if applicable)  Cancer Patient: __ yes _x_no __unknown; If yes, clinical stage T:__ N:__M:__, stage group or __N/A    Impression: Cosmetic surgery    Plan: Bilateral thigh lift with liposuction, abdominal liposuction    Razia Marcano, APRN   2/14/2019   6:58 AM

## 2019-02-14 NOTE — PLAN OF CARE
Problem: Patient Care Overview  Goal: Plan of Care Review  Outcome: Ongoing (interventions implemented as appropriate)   02/14/19 9333   Coping/Psychosocial   Plan of Care Reviewed With patient   Plan of Care Review   Progress no change   OTHER   Outcome Summary pt ambulating in halls and in room, prn pain meds given, vss, will cont to monitor.

## 2019-02-14 NOTE — ANESTHESIA PROCEDURE NOTES
Peripheral Block      Patient location during procedure: OR  Reason for block: at surgeon's request and post-op pain management  Performed by  Anesthesiologist: Glen Galarza MD  Preanesthetic Checklist  Completed: patient identified, site marked, surgical consent, pre-op evaluation, timeout performed, IV checked, risks and benefits discussed and monitors and equipment checked  Prep:  Pt Position: supine  Sterile barriers:cap, gloves, sterile barriers and mask  Prep: ChloraPrep  Patient monitoring: blood pressure monitoring, continuous pulse oximetry and EKG  Procedure  Sedation:yes  Performed under: general  Guidance:ultrasound guided  Images:still images obtained    Laterality:Bilateral  Block Type:TAP  Injection Technique:single-shot  Needle Type:short-bevel and echogenic  Needle Gauge:20 G    Medications Used: dexamethasone sodium phosphate injection, 4 mg  buprenorphine (BUPRENEX) injection, 0.3 mg  bupivacaine PF (MARCAINE) 0.25 % injection, 60 mL  Med admintered at 2/14/2019 7:32 AM  Medications  Preservative Free Saline:10ml  Comment:Block Injection:  LA dose divided between Right and Left block       Adjuncts:  Decadron 4mg PSF, Buprenex 0.3mg (Per total volume of LA)    Post Assessment  Injection Assessment: negative aspiration for heme, incremental injection and no paresthesia on injection  Patient Tolerance:comfortable throughout block  Complications:no  Additional Notes      Under Ultrasound guidance, a BBraun 4inch 360 degree needle was advanced with Normal Saline hydro dissection of tissue.  The Internal Oblique and Transversus Abdominus muscles where visualized.  At or before the aponeurosis of Internal Oblique, local anesthetic spread was visualized in the Transversus Abdominus Plane. Injection was made incrementally with aspiration every 5 mls.  There was no  intravascular injection,  injection pressure was normal, there was no neural injection, and the procedure was completed without difficulty.   Thank You.

## 2019-02-14 NOTE — ANESTHESIA PROCEDURE NOTES
Airway  Urgency: elective    Airway not difficult    General Information and Staff    Patient location during procedure: OR  CRNA: Savage Barrios CRNA    Indications and Patient Condition  Indications for airway management: airway protection    Preoxygenated: yes  MILS not maintained throughout  Mask difficulty assessment: 1 - vent by mask    Final Airway Details  Final airway type: endotracheal airway      Successful airway: ETT  Cuffed: yes   Successful intubation technique: direct laryngoscopy  Facilitating devices/methods: intubating stylet  Endotracheal tube insertion site: oral  Blade: Powell  Blade size: 2  ETT size (mm): 7.0  Cormack-Lehane Classification: grade IIa - partial view of glottis  Placement verified by: chest auscultation and capnometry   Cuff volume (mL): 5  Measured from: lips  ETT to lips (cm): 21  Number of attempts at approach: 1    Additional Comments  Negative epigastric sounds, Breath sound equal bilaterally with symmetric chest rise and fall

## 2019-02-15 VITALS
TEMPERATURE: 98 F | OXYGEN SATURATION: 93 % | RESPIRATION RATE: 14 BRPM | HEART RATE: 84 BPM | SYSTOLIC BLOOD PRESSURE: 137 MMHG | WEIGHT: 245 LBS | BODY MASS INDEX: 39.37 KG/M2 | HEIGHT: 66 IN | DIASTOLIC BLOOD PRESSURE: 71 MMHG

## 2019-02-15 RX ADMIN — OXYCODONE HYDROCHLORIDE AND ACETAMINOPHEN 1 TABLET: 7.5; 325 TABLET ORAL at 09:46

## 2019-02-15 RX ADMIN — OXYCODONE HYDROCHLORIDE AND ACETAMINOPHEN 1 TABLET: 7.5; 325 TABLET ORAL at 06:06

## 2019-02-15 NOTE — PLAN OF CARE
Problem: Patient Care Overview  Goal: Plan of Care Review  Outcome: Ongoing (interventions implemented as appropriate)   02/15/19 0038   Coping/Psychosocial   Plan of Care Reviewed With patient   OTHER   Outcome Summary patient ambulated to the bathroom, stable VS, room air, prn pain meds given, 2 ludy drains, SCDS on, will continue to monitor

## 2019-02-15 NOTE — DISCHARGE SUMMARY
Date of admission: 2/14/2019    Date of discharge: 2/15/2019    Discharging physician: Enrique Leroy M.D.    Diagnosis: Encounter for cosmetic surgery    Operations/procedures: 1.  Bilateral thigh left  2.  Liposuction abdomen    Past medical history/Hospital course: The patient is a 33-year-old white female who lost 200 pounds through diet and exercise.  She is undergone several body contouring procedures.  She presented to the operating room yesterday for bilateral thigh lift and liposuction of her abdomen.  Her postoperative stay was uncomplicated.  She remained afebrile and vital he stable.  On hospital day #2 postoperative day #1, the patient was a ambulatory, tolerating by mouth, and able to urinate.  She was therefore deemed ready for discharge.    Plan disposition: Discharged to home.  We've all dressings in place.    Diet: Regular    Activity: Light activity    Medications: She is to resume all of her home medications.  She is artery been given a prescription for pain medicine.    Follow-up: The patient will follow up with me in the office.

## 2020-06-04 ENCOUNTER — HOSPITAL ENCOUNTER (OUTPATIENT)
Dept: HOSPITAL 22 - PT.CARL | Age: 35
LOS: 39 days | Discharge: HOME | End: 2020-07-13
Payer: COMMERCIAL

## 2020-06-04 DIAGNOSIS — M77.11: Primary | ICD-10-CM

## 2020-06-04 PROCEDURE — 97110 THERAPEUTIC EXERCISES: CPT

## 2020-06-04 PROCEDURE — 97035 APP MDLTY 1+ULTRASOUND EA 15: CPT

## 2020-06-04 PROCEDURE — 97010 HOT OR COLD PACKS THERAPY: CPT

## 2020-06-04 PROCEDURE — G0283 ELEC STIM OTHER THAN WOUND: HCPCS

## 2020-06-04 PROCEDURE — 97163 PT EVAL HIGH COMPLEX 45 MIN: CPT

## 2020-06-04 PROCEDURE — 97033 APP MDLTY 1+IONTPHRSIS EA 15: CPT

## 2020-06-04 PROCEDURE — 97014 ELECTRIC STIMULATION THERAPY: CPT

## 2020-08-06 ENCOUNTER — HOSPITAL ENCOUNTER (OUTPATIENT)
Dept: HOSPITAL 22 - PT.CARL | Age: 35
LOS: 39 days | Discharge: HOME | End: 2020-09-14
Payer: COMMERCIAL

## 2020-08-06 DIAGNOSIS — M77.11: Primary | ICD-10-CM

## 2020-08-06 PROCEDURE — 97110 THERAPEUTIC EXERCISES: CPT

## 2020-08-06 PROCEDURE — G0283 ELEC STIM OTHER THAN WOUND: HCPCS

## 2020-08-06 PROCEDURE — 97163 PT EVAL HIGH COMPLEX 45 MIN: CPT

## 2020-08-06 PROCEDURE — 97010 HOT OR COLD PACKS THERAPY: CPT

## 2020-08-06 PROCEDURE — 97140 MANUAL THERAPY 1/> REGIONS: CPT

## 2020-08-06 PROCEDURE — 97014 ELECTRIC STIMULATION THERAPY: CPT

## 2021-04-21 ENCOUNTER — OFFICE VISIT (OUTPATIENT)
Dept: OBSTETRICS AND GYNECOLOGY | Facility: CLINIC | Age: 36
End: 2021-04-21

## 2021-04-21 VITALS
SYSTOLIC BLOOD PRESSURE: 118 MMHG | DIASTOLIC BLOOD PRESSURE: 78 MMHG | TEMPERATURE: 97.3 F | HEIGHT: 66 IN | BODY MASS INDEX: 40.82 KG/M2 | WEIGHT: 254 LBS

## 2021-04-21 DIAGNOSIS — E28.2 PCOS (POLYCYSTIC OVARIAN SYNDROME): ICD-10-CM

## 2021-04-21 DIAGNOSIS — Z30.431 IUD CHECK UP: ICD-10-CM

## 2021-04-21 DIAGNOSIS — Z01.419 WOMEN'S ANNUAL ROUTINE GYNECOLOGICAL EXAMINATION: Primary | ICD-10-CM

## 2021-04-21 DIAGNOSIS — E66.9 OBESITY (BMI 30-39.9): ICD-10-CM

## 2021-04-21 DIAGNOSIS — L68.0 FEMALE HIRSUTISM: ICD-10-CM

## 2021-04-21 PROCEDURE — 99395 PREV VISIT EST AGE 18-39: CPT | Performed by: NURSE PRACTITIONER

## 2021-04-21 RX ORDER — TOPIRAMATE 50 MG/1
TABLET, FILM COATED ORAL
COMMUNITY
Start: 2021-04-12 | End: 2022-09-13 | Stop reason: ALTCHOICE

## 2021-04-21 RX ORDER — OMEPRAZOLE 20 MG/1
20 CAPSULE, DELAYED RELEASE ORAL 2 TIMES DAILY
COMMUNITY
Start: 2021-02-24 | End: 2022-10-27

## 2021-04-21 RX ORDER — METFORMIN HYDROCHLORIDE 750 MG/1
1500 TABLET, EXTENDED RELEASE ORAL
Qty: 60 TABLET | Refills: 11 | Status: SHIPPED | OUTPATIENT
Start: 2021-04-21 | End: 2022-09-13 | Stop reason: ALTCHOICE

## 2021-04-21 RX ORDER — CHOLECALCIFEROL (VITAMIN D3) 1250 MCG
50000 CAPSULE ORAL WEEKLY
COMMUNITY
Start: 2021-03-22 | End: 2022-09-13 | Stop reason: ALTCHOICE

## 2021-04-21 RX ORDER — BUSPIRONE HYDROCHLORIDE 5 MG/1
TABLET ORAL
COMMUNITY
Start: 2021-04-19 | End: 2022-09-13 | Stop reason: ALTCHOICE

## 2021-04-21 RX ORDER — MULTIPLE VITAMINS W/ MINERALS TAB 9MG-400MCG
1 TAB ORAL DAILY
COMMUNITY
End: 2022-09-13 | Stop reason: ALTCHOICE

## 2021-04-21 RX ORDER — VALSARTAN AND HYDROCHLOROTHIAZIDE 160; 25 MG/1; MG/1
1 TABLET ORAL DAILY
COMMUNITY
Start: 2021-04-12 | End: 2022-09-13 | Stop reason: ALTCHOICE

## 2021-04-21 RX ORDER — POTASSIUM CHLORIDE 1500 MG/1
TABLET, FILM COATED, EXTENDED RELEASE ORAL
COMMUNITY
Start: 2021-03-09 | End: 2022-09-13 | Stop reason: ALTCHOICE

## 2021-04-21 NOTE — PROGRESS NOTES
GYN Annual Exam     CC - Here for annual exam.        HPI  Ely De La Garza is a 35 y.o. female, , who presents for annual well woman exam.  Periods are absent secondary to birth control.  Dysmenorrhea:none.  Patient reports problems with: facial hair.  Since her last visit the patient underwent surgery for gastric sleeve. Partner Status: Marital Status: .  She is sexually active. She has not had new partners since her last STD testing. She does not desire STD testing.      She has a hx of PCOS and metformin use.  Her facial hair is worsening and she would like to take meds again.    Additional OB/GYN History   Current contraception: contraceptive methods: IUD.  Insertion date: 2019, would like to have strings trimmed today.  Her partner complained they were sharp one time recently.  Desires to: continue contraception  Last Pap :   Last Completed Pap Smear       Status Date      PAP SMEAR Done 2019 Negative        History of abnormal Pap smear: no  Family history of uterine, colon, breast, or ovarian cancer: yes - MGM - uterine or ovarian cancer  Performs monthly Self-Breast Exam: no  Exercises Regularly:yes  Feelings of Anxiety or Depression: yes - currently on medication  Tobacco Usage?: No   OB History        1    Para   1    Term   1            AB        Living   1       SAB        TAB        Ectopic        Molar        Multiple        Live Births   1                Health Maintenance   Topic Date Due   • Annual Gynecologic Pelvic and Breast Exam  Never done   • ANNUAL PHYSICAL  Never done   • COVID-19 Vaccine (1) Never done   • TDAP/TD VACCINES (1 - Tdap) Never done   • HEPATITIS C SCREENING  Never done   • INFLUENZA VACCINE  2021   • PAP SMEAR  2022   • Pneumococcal Vaccine 0-64  Aged Out       The additional following portions of the patient's history were reviewed and updated as appropriate: allergies, current medications, past family history, past  "medical history, past social history, past surgical history and problem list.    Review of Systems   Constitutional: Negative.    HENT: Negative.    Eyes: Negative.    Respiratory: Negative.    Cardiovascular: Negative.    Gastrointestinal: Negative.    Endocrine: Negative.    Genitourinary: Negative.    Musculoskeletal: Negative.    Skin: Negative.    Allergic/Immunologic: Negative.    Neurological: Negative.    Hematological: Negative.    Psychiatric/Behavioral: Negative.      All other systems reviewed and are negative.     I have reviewed and agree with the HPI, ROS, and historical information as entered above. Huong León Yuliya, APRN    Objective   /78   Temp 97.3 °F (36.3 °C)   Ht 167.6 cm (66\")   Wt 115 kg (254 lb)   Breastfeeding No   BMI 41.00 kg/m²     Physical Exam  Vitals and nursing note reviewed. Exam conducted with a chaperone present.   Constitutional:       Appearance: She is well-developed.   HENT:      Head: Normocephalic and atraumatic.   Neck:      Thyroid: No thyroid mass or thyromegaly.   Pulmonary:      Effort: Pulmonary effort is normal. No retractions.   Chest:      Chest wall: No mass.      Breasts:         Right: Normal. No mass, nipple discharge, skin change or tenderness.         Left: Normal. No mass, nipple discharge, skin change or tenderness.   Abdominal:      Palpations: Abdomen is soft. Abdomen is not rigid. There is no mass.      Tenderness: There is no abdominal tenderness. There is no guarding.      Hernia: No hernia is present. There is no hernia in the left inguinal area.   Genitourinary:     General: Normal vulva.      Labia:         Right: No rash, tenderness or lesion.         Left: No rash, tenderness or lesion.       Vagina: Normal. No vaginal discharge or lesions.      Cervix: Normal.      Uterus: Normal. Not enlarged, not fixed and not tender.       Adnexa: Right adnexa normal and left adnexa normal.        Right: No mass or tenderness.          Left: No mass or " tenderness.        Rectum: No external hemorrhoid.      Comments: IUD strings present; trimmed string to 1 cm  Musculoskeletal:      Cervical back: Normal range of motion. No muscular tenderness.   Skin:     General: Skin is warm and dry.   Neurological:      Mental Status: She is alert and oriented to person, place, and time.   Psychiatric:         Mood and Affect: Mood normal.         Behavior: Behavior normal.            Assessment and Plan    Problem List Items Addressed This Visit     None      Visit Diagnoses     Women's annual routine gynecological examination    -  Primary    Relevant Orders    Pap IG, HPV-hr    PCOS (polycystic ovarian syndrome)        Relevant Medications    metFORMIN ER (Glucophage XR) 750 MG 24 hr tablet    Female hirsutism        Obesity (BMI 30-39.9)        IUD check up              1. D/w pt start metformin, gradually increasing to 2 qd.  Other options are OCPs and spironolactone.  2. Reviewed monthly self breast exams.  Instructed to call with lumps, pain, or breast discharge.    3. Reviewed BMI and weight loss as preventative health measures.   4. Reviewed exercise as a preventative health measures.   5. RTC in 1 year or PRN with problems      Huong Dwyer, APRN  04/21/2021

## 2021-04-22 DIAGNOSIS — Z01.419 WOMEN'S ANNUAL ROUTINE GYNECOLOGICAL EXAMINATION: ICD-10-CM

## 2022-03-23 ENCOUNTER — OFFICE VISIT (OUTPATIENT)
Dept: OBSTETRICS AND GYNECOLOGY | Facility: CLINIC | Age: 37
End: 2022-03-23

## 2022-03-23 VITALS — BODY MASS INDEX: 35.2 KG/M2 | HEIGHT: 66 IN | WEIGHT: 219 LBS

## 2022-03-23 DIAGNOSIS — L68.0 FEMALE HIRSUTISM: ICD-10-CM

## 2022-03-23 DIAGNOSIS — E28.2 PCOS (POLYCYSTIC OVARIAN SYNDROME): Primary | ICD-10-CM

## 2022-03-23 DIAGNOSIS — N94.9 UTERINE TENDERNESS: ICD-10-CM

## 2022-03-23 DIAGNOSIS — N93.9 ABNORMAL UTERINE BLEEDING (AUB): ICD-10-CM

## 2022-03-23 DIAGNOSIS — E28.2 PCOS (POLYCYSTIC OVARIAN SYNDROME): ICD-10-CM

## 2022-03-23 DIAGNOSIS — Z30.431 IUD CHECK UP: Primary | ICD-10-CM

## 2022-03-23 DIAGNOSIS — R10.2 PELVIC PAIN: ICD-10-CM

## 2022-03-23 PROCEDURE — 99213 OFFICE O/P EST LOW 20 MIN: CPT | Performed by: NURSE PRACTITIONER

## 2022-03-23 RX ORDER — DOXYCYCLINE 100 MG/1
100 CAPSULE ORAL 2 TIMES DAILY
Qty: 14 CAPSULE | Refills: 0 | Status: SHIPPED | OUTPATIENT
Start: 2022-03-23 | End: 2022-03-30

## 2022-03-23 RX ORDER — SPIRONOLACTONE 50 MG/1
50 TABLET, FILM COATED ORAL 2 TIMES DAILY
Qty: 60 TABLET | Refills: 1 | Status: SHIPPED | OUTPATIENT
Start: 2022-03-23 | End: 2022-09-13 | Stop reason: ALTCHOICE

## 2022-03-23 NOTE — PROGRESS NOTES
"CC:    HPI  Ely De La Garza is a 36 y.o. female, , who presents with initial evaluation of bleeding and cramping with Mirena x2 months.  It was inserted approx 2 yrs ago and she has had no problems.   Periods are irregular occurring every 2 weeks, lasting 14 days. The patient uses 3 of tampons/pads per hour..    She also has LLQ pain with tampon insertion and intercourse. Patient has been evaluated: No.    She denies other pelvic pain, dysuria, urinary frequency, bowel changes, abn dc, itching, odor, new partners, the need for STD testing.    Did the patient have u/s today? yes    Additional OB/GYN History   Last Pap : 2021 negative  Last Completed Pap Smear          PAP SMEAR (Every 3 Years) Next due on 2021  Pap IG, HPV-hr    2019  Done - Negative              History of abnormal Pap smear: no  Tobacco Usage?: No     The additional following portions of the patient's history were reviewed and updated as appropriate: allergies, current medications, past family history, past medical history, past social history, past surgical history and problem list.    Review of Systems  All other systems reviewed and are negative.     I have reviewed and agree with the HPI, ROS, and historical information as entered above. Huong Dwyer, APRN    Objective   Ht 167.6 cm (66\")   Wt 99.3 kg (219 lb)   LMP 2022   BMI 35.35 kg/m²     Physical Exam  Vitals and nursing note reviewed. Exam conducted with a chaperone present.   Constitutional:       General: She is not in acute distress.     Appearance: Normal appearance. She is not ill-appearing.   Pulmonary:      Effort: Pulmonary effort is normal.   Abdominal:      General: There is no distension.      Palpations: Abdomen is soft. There is no mass.      Tenderness: There is no abdominal tenderness. There is no guarding or rebound.      Hernia: No hernia is present.   Genitourinary:     General: Normal vulva.      Vagina: Normal.      " Cervix: Normal.      Uterus: Tender.       Adnexa: Right adnexa normal and left adnexa normal.      Comments: Mild uterine tenderness  Skin:     General: Skin is warm and dry.   Neurological:      Mental Status: She is alert and oriented to person, place, and time.   Psychiatric:         Mood and Affect: Mood normal.         Behavior: Behavior normal.            Assessment and Plan    Problem List Items Addressed This Visit    None     Visit Diagnoses     IUD check up    -  Primary    Relevant Orders    US Non-ob Transvaginal    Pelvic pain        PCOS (polycystic ovarian syndrome)        Abnormal uterine bleeding (AUB)        Female hirsutism        Uterine tenderness            D/w pt US shows IUD in correct position.  Ovaries c/w hx of PCOS.  No cysts or free fld.  Take doxy.  Call prn continued symptoms in 2 wks.    Huong Dwyer, APRN  03/23/2022

## 2022-04-27 ENCOUNTER — OFFICE VISIT (OUTPATIENT)
Dept: OBSTETRICS AND GYNECOLOGY | Facility: CLINIC | Age: 37
End: 2022-04-27

## 2022-04-27 VITALS
BODY MASS INDEX: 34.55 KG/M2 | SYSTOLIC BLOOD PRESSURE: 108 MMHG | HEIGHT: 66 IN | DIASTOLIC BLOOD PRESSURE: 80 MMHG | WEIGHT: 215 LBS

## 2022-04-27 DIAGNOSIS — Z01.419 PAP TEST, AS PART OF ROUTINE GYNECOLOGICAL EXAMINATION: Primary | ICD-10-CM

## 2022-04-27 DIAGNOSIS — Z01.419 WOMEN'S ANNUAL ROUTINE GYNECOLOGICAL EXAMINATION: ICD-10-CM

## 2022-04-27 DIAGNOSIS — Z30.09 FAMILY PLANNING: ICD-10-CM

## 2022-04-27 DIAGNOSIS — Z30.432 ENCOUNTER FOR IUD REMOVAL: ICD-10-CM

## 2022-04-27 PROCEDURE — 99395 PREV VISIT EST AGE 18-39: CPT | Performed by: NURSE PRACTITIONER

## 2022-04-27 PROCEDURE — 58301 REMOVE INTRAUTERINE DEVICE: CPT | Performed by: NURSE PRACTITIONER

## 2022-04-27 NOTE — PROGRESS NOTES
GYN Annual Exam     CC - Here for annual exam.        [facility]  Ely De La Garza is a 36 y.o. female, , who presents for annual well woman exam. .  Periods are regular every 25-35 days, lasting 18 days days. .  Dysmenorrhea:mild, occurring first 1-2 days of flow.  Patient reports problems with: none.  There were no changes to her medical or surgical history since her last visit.. Partner Status: Marital Status: .  She is sexually active. She has not had new partners since her last STD testing. She does not desire STD testing. .  Her child is 12 yo.  When she was 28 wks gestation, her cervix was 4 cm.  She was hospitalized on bedrest.  She had SROM at 29 wks and delivered.  She was told she needs a cerclage with her next pregnancy.    Patient would like IUD out to try to conceive  Patient used Famara to conceive the first time Patient has had gastric sleeve    Additional OB/GYN History   Current contraception: contraceptive methods: IUD.  Insertion date: would like to take out today  Desires to: stop contraception  Last Pap : 2021. Result: neg  Last Completed Pap Smear          Ordered - PAP SMEAR (Every 3 Years) Ordered on 2021  Pap IG, HPV-hr    2019  Done - Negative            Patient has had mammogram was abnormal to be re done every 6 months  History of abnormal Pap smear: no  Family history of uterine, colon, breast, or ovarian cancer: yes - MGM uterine and Ovarian  Performs monthly Self-Breast Exam: yes  Exercises Regularly:yes  Feelings of Anxiety or Depression: yes - self controlled  Tobacco Usage?: No   OB History        1    Para   1    Term   1            AB        Living   1       SAB        IAB        Ectopic        Molar        Multiple        Live Births   1                Health Maintenance   Topic Date Due   • ANNUAL PHYSICAL  Never done   • TDAP/TD VACCINES (1 - Tdap) Never done   • HEPATITIS C SCREENING  Never done   • COVID-19 Vaccine (3  "- Booster for Pfizer series) 12/01/2021   • INFLUENZA VACCINE  08/01/2022   • Annual Gynecologic Pelvic and Breast Exam  04/28/2023   • PAP SMEAR  04/22/2024   • Pneumococcal Vaccine 0-64  Aged Out       The additional following portions of the patient's history were reviewed and updated as appropriate: allergies, current medications, past family history, past medical history, past social history, past surgical history and problem list.    Review of Systems      I have reviewed and agree with the HPI, ROS, and historical information as entered above. Huong Romelia Thapaf, APRN    Objective   /80   Ht 167.6 cm (66\")   Wt 97.5 kg (215 lb)   LMP 04/02/2022   BMI 34.70 kg/m²     Physical Exam  Vitals and nursing note reviewed. Exam conducted with a chaperone present.   Constitutional:       General: She is not in acute distress.     Appearance: Normal appearance. She is well-developed. She is not ill-appearing.   HENT:      Head: Normocephalic and atraumatic.   Neck:      Thyroid: No thyroid mass or thyromegaly.   Pulmonary:      Effort: Pulmonary effort is normal. No retractions.   Chest:      Chest wall: No mass.   Breasts:      Right: Normal. No mass, nipple discharge, skin change or tenderness.      Left: Normal. No mass, nipple discharge, skin change or tenderness.       Abdominal:      Palpations: Abdomen is soft. Abdomen is not rigid. There is no mass.      Tenderness: There is no abdominal tenderness. There is no guarding.      Hernia: No hernia is present. There is no hernia in the left inguinal area.   Genitourinary:     General: Normal vulva.      Labia:         Right: No rash, tenderness or lesion.         Left: No rash, tenderness or lesion.       Vagina: Normal. No vaginal discharge or lesions.      Cervix: Normal.      Uterus: Normal. Not enlarged, not fixed and not tender.       Adnexa: Right adnexa normal and left adnexa normal.        Right: No mass or tenderness.          Left: No mass or " tenderness.        Rectum: No external hemorrhoid.      Comments: IUD strings noted; IUD removed without difficulty; see procedure note  Musculoskeletal:      Cervical back: Normal range of motion. No muscular tenderness.   Skin:     General: Skin is warm and dry.   Neurological:      Mental Status: She is alert and oriented to person, place, and time.   Psychiatric:         Mood and Affect: Mood normal.         Behavior: Behavior normal.            Assessment and Plan    Problem List Items Addressed This Visit    None     Visit Diagnoses     Pap test, as part of routine gynecological examination    -  Primary    Relevant Orders    Pap IG, Rfx HPV ASCU    Women's annual routine gynecological examination        Encounter for IUD removal        Family planning              1. GYN annual well woman exam.   2. Preconceptual Counseling.  Discussed timed intercourse, regular cycles, prenatal vitamins, and when to call.  3. Reviewed monthly self breast exams.  Instructed to call with lumps, pain, or breast discharge.    4. Reviewed exercise as a preventative health measures.   5. Reccommended Flu Vaccine in Fall of each year.  6. RTC in 1 year or PRN with problems  Return in about 1 year (around 4/27/2023) for Annual physical.   8.   Start daily PNV.  Keep cycle calendar.  Will make plan for early eval with +UPT.      Huong Dwyer, APRN  04/27/2022

## 2022-04-27 NOTE — PROGRESS NOTES
IUD Removal Procedure Note    Procedures    Type of IUD:  Mirena   Date of insertion:  2019  Reason for removal:  Desires pregnancy    Procedure Time Out Documentation    Procedure Details  IUD strings visible:  yes  Removal:  IUD strings grasped and IUD removed intact with gentle traction.  The patient tolerated the procedure well.    All appropriate instructions regarding removal were reviewed.    Tolerated well  No apparent complications  Post procedure diagnosis : IUD removal     Plans for contraception:  no method    The patient was advised to call for any fever or for prolonged or severe pain or bleeding. She was advised to use motrin as needed for mild to moderate pain.     Huong Dwyer, APRN  04/27/2022

## 2022-05-03 DIAGNOSIS — Z01.419 PAP TEST, AS PART OF ROUTINE GYNECOLOGICAL EXAMINATION: ICD-10-CM

## 2022-09-12 PROBLEM — K21.9 ESOPHAGEAL REFLUX: Status: ACTIVE | Noted: 2022-09-12

## 2022-09-12 PROBLEM — F32.A DEPRESSION: Status: ACTIVE | Noted: 2022-09-12

## 2022-09-12 PROBLEM — Z86.16 HISTORY OF COVID-19: Status: ACTIVE | Noted: 2022-09-12

## 2022-09-12 PROBLEM — I89.0 LYMPHEDEMA: Status: ACTIVE | Noted: 2022-09-12

## 2022-09-12 PROBLEM — E66.9 OBESE: Status: ACTIVE | Noted: 2022-09-12

## 2022-09-12 PROBLEM — E04.9 GOITER: Status: ACTIVE | Noted: 2022-09-12

## 2022-09-12 PROBLEM — E28.2 POLYCYSTIC OVARIES: Status: ACTIVE | Noted: 2022-09-12

## 2022-09-12 PROBLEM — K21.9 GERD (GASTROESOPHAGEAL REFLUX DISEASE): Status: ACTIVE | Noted: 2022-09-12

## 2022-09-13 ENCOUNTER — OFFICE VISIT (OUTPATIENT)
Dept: FAMILY MEDICINE CLINIC | Facility: CLINIC | Age: 37
End: 2022-09-13

## 2022-09-13 VITALS
TEMPERATURE: 98.8 F | BODY MASS INDEX: 34.62 KG/M2 | DIASTOLIC BLOOD PRESSURE: 83 MMHG | WEIGHT: 215.4 LBS | HEIGHT: 66 IN | OXYGEN SATURATION: 98 % | RESPIRATION RATE: 12 BRPM | HEART RATE: 86 BPM | SYSTOLIC BLOOD PRESSURE: 130 MMHG

## 2022-09-13 DIAGNOSIS — R05.9 COUGH: ICD-10-CM

## 2022-09-13 DIAGNOSIS — R19.7 DIARRHEA OF PRESUMED INFECTIOUS ORIGIN: ICD-10-CM

## 2022-09-13 DIAGNOSIS — R11.2 NAUSEA AND VOMITING, UNSPECIFIED VOMITING TYPE: ICD-10-CM

## 2022-09-13 DIAGNOSIS — Z86.16 HISTORY OF COVID-19: ICD-10-CM

## 2022-09-13 DIAGNOSIS — K52.9 ACUTE GASTROENTERITIS: Primary | ICD-10-CM

## 2022-09-13 PROCEDURE — 99213 OFFICE O/P EST LOW 20 MIN: CPT | Performed by: INTERNAL MEDICINE

## 2022-09-13 RX ORDER — PREDNISONE 10 MG/1
TABLET ORAL
COMMUNITY
Start: 2022-07-26 | End: 2022-09-13 | Stop reason: ALTCHOICE

## 2022-09-13 RX ORDER — PROMETHAZINE HYDROCHLORIDE 25 MG/1
25 TABLET ORAL EVERY 6 HOURS PRN
Qty: 10 TABLET | Refills: 0 | Status: SHIPPED | OUTPATIENT
Start: 2022-09-13 | End: 2022-11-30

## 2022-09-13 RX ORDER — DICLOFENAC SODIUM 75 MG/1
TABLET, DELAYED RELEASE ORAL
COMMUNITY
Start: 2022-08-23 | End: 2022-09-13 | Stop reason: ALTCHOICE

## 2022-09-13 RX ORDER — TRAMADOL HYDROCHLORIDE 50 MG/1
50 TABLET ORAL 3 TIMES DAILY PRN
COMMUNITY
Start: 2022-07-29 | End: 2022-09-13 | Stop reason: ALTCHOICE

## 2022-09-13 RX ORDER — FOLIC ACID 1 MG/1
1 TABLET ORAL DAILY
COMMUNITY
End: 2022-11-22

## 2022-09-13 NOTE — PROGRESS NOTES
"    Follow Up Office Visit      Date: 2022   Patient Name: Ely De La Garza  : 1985   MRN: [id number]     Chief Complaint:    Chief Complaint   Patient presents with   • Cough     Family positive two weeks ago   • Vomiting   • Fever   • Diarrhea       History of Present Illness: Ely De La Garza is a 36 y.o. female who is here today for lingering cough persisting now for almost 2 weeks after she developed symptoms of cough fever malaise and some nasal symptoms presumed to be secondary to COVID-19 after she is exposed to both her son and  who had the same symptoms and had actually had a positive COVID-19 test.  Patient did not have a test herself.  She feels like things got better from a respiratory standpoint other than a lingering cough worse at night, nonproductive, with no dyspnea, but also in the last 24 hours did develop 2 episodes of nausea and vomiting, and also multiple loose watery diarrheal stools which have lingered through today, some stomach cramps.  These are new symptoms compared to her previous upper respiratory symptoms as noted.  No other GI symptoms in other members.  No other acute problems.  She does note having some left hip pain which lingered, having seen Dr. Bridger Mancera of orthopedic surgery who ultimately ordered an MRI of her left hip with contrast indicating she had a labral collagenous tear and some bone spurs, and is being referred to Dr. Mendoza of  for consideration of \"hip preservation surgery\".  Unrelated, patient has discontinued all of her previously prescribed medications including antihypertensives metformin, since 2022, noting she is attempting to get pregnant.  Only medication she is taking now is omeprazole.  She is now about 2 days late on her menstrual cycle but notes this is not unusual.       Subjective      Review of Systems:   Review of Systems    I have reviewed the patients family history, social history, past medical history, past " "surgical history and have updated it as appropriate.     Medications:     Current Outpatient Medications:   •  folic acid (FOLVITE) 1 MG tablet, Take 1 mg by mouth Daily. 1 daily, Disp: , Rfl:   •  omeprazole (priLOSEC) 20 MG capsule, Take 20 mg by mouth 2 (Two) Times a Day., Disp: , Rfl:   •  promethazine (PHENERGAN) 25 MG tablet, Take 1 tablet by mouth Every 6 (Six) Hours As Needed for Nausea or Vomiting., Disp: 10 tablet, Rfl: 0    Allergies:   No Known Allergies    Objective     Physical Exam: Please see above  Vital Signs:   Vitals:    09/13/22 1026   BP: 130/83   BP Location: Left arm   Patient Position: Sitting   Cuff Size: Adult   Pulse: 86   Resp: 12   Temp: 98.8 °F (37.1 °C)   TempSrc: Temporal   SpO2: 98%   Weight: 97.7 kg (215 lb 6.4 oz)   Height: 167.6 cm (66\")     Body mass index is 34.77 kg/m².  BMI is >= 30 and <35. (Class 1 Obesity). The following options were offered after discussion;: weight loss educational material (shared in after visit summary), exercise counseling/recommendations and nutrition counseling/recommendations       Physical Exam  General: Very pleasant healthy-appearing 36-year-old white female with a BMI 34.7.  Head and neck: TMs and canals bilaterally clear, nares minimal congestion with no mucus, no palpable maxillary sinus tenderness, having some mild subjective discomfort in the right frontal region, oropharynx clear with good dentition and moist membranes, neck supple with no adenopathy masses or tenderness  Lungs are clear with no wheeze tachypnea dyspnea or observed cough during the entire visit, good airflow on forced exhalation  Cardiac regular rate rhythm with no murmurs gallops or rubs  Abdomen is very mildly obese and soft throughout with some mild generalized discomfort to deep palpation, no rebound or guard, no organomegaly or masses, normal active to mildly hyperactive bowel sounds  Procedures    Results:   Labs:   No results found for: HGBA1C, CMP, CBCDIFFPANEL, " CREAT, TSH     POCT Results (if applicable):   Results for orders placed or performed in visit on 09/12/22    MAMMOGRAPHY   Result Value Ref Range     Mammogram SEE REPORT         Imaging:   No valid procedures specified.       Assessment / Plan      Assessment/Plan:   Diagnoses and all orders for this visit:    1. Acute gastroenteritis (Primary)  Likely new infectious agent rather than caused by presumed COVID-19 infection a couple weeks earlier.  She was not having any initial GI symptoms, having had resolution of her fever up until yesterday with now development of some nausea vomiting diarrhea.  She does not appear to be volume depleted at all.  Treat with Phenergan as noted, given Zofran apparently does not cause any significant benefit, clear liquids, and Imodium as needed.  Advise if not improving.  2. Nausea and vomiting, unspecified vomiting type  -     promethazine (PHENERGAN) 25 MG tablet; Take 1 tablet by mouth Every 6 (Six) Hours As Needed for Nausea or Vomiting.  Dispense: 10 tablet; Refill: 0  See above.  3. Diarrhea of presumed infectious origin  See above.  4. Cough  Likely post COVID-related, with presumptive COVID-19 infection based upon symptoms and documented exposure to multiple family members.  Her initial respiratory symptoms started just a couple weeks ago, thus this is certainly well within her range of anticipated lingering cough.  She has no signs on exam of pneumonia or reactive airways.  Simply treat with Robitussin or equivalent, dissipating cough may linger on for several weeks longer.  5. History of COVID-19  Presumptive based upon documented exposure to son and  2 weeks ago who were diagnosed formally COVID-19, patient having had the exact same symptoms.  Clinically improving with a lingering cough which I explained is very typical.  Advised may take several weeks for resolution.      Follow Up:   Return if symptoms worsen or fail to improve.      At [facility], we  believe that sharing information builds trust and better relationships. You are receiving this note because you recently visited [facility]. It is possible you will see health information before a provider has talked with you about it. This kind of information can be easy to misunderstand. To help you fully understand what it means for your health, we urge you to discuss this note with your provider.    Harley Rodriguez MD  [facility]

## 2022-09-14 ENCOUNTER — PATIENT ROUNDING (BHMG ONLY) (OUTPATIENT)
Dept: FAMILY MEDICINE CLINIC | Facility: CLINIC | Age: 37
End: 2022-09-14

## 2022-09-14 NOTE — PROGRESS NOTES
.A [facility] message has been sent to the patient for patient rounding with [facility].

## 2022-10-04 ENCOUNTER — TELEPHONE (OUTPATIENT)
Dept: OBSTETRICS AND GYNECOLOGY | Facility: CLINIC | Age: 37
End: 2022-10-04

## 2022-10-04 NOTE — TELEPHONE ENCOUNTER
Pt left VM, she wishes to have an IUD reinserted.     Returned pt's call, she reports that she is no longer trying to concieve and would like to have an IUD inserted, she requests a mirena. Her LMP was 9/18/2022. Her last annual was 04/27/2022.     Instructed pt to call back on her first day of her next period and we will get her scheduled that week, told pt to not have unprotected sex within that two weeks prior to have IUD inserted. Pt verbalizes understanding.

## 2022-10-19 PROBLEM — E66.9 OBESITY (BMI 35.0-39.9 WITHOUT COMORBIDITY): Status: ACTIVE | Noted: 2022-09-16

## 2022-10-27 ENCOUNTER — OFFICE VISIT (OUTPATIENT)
Dept: FAMILY MEDICINE CLINIC | Facility: CLINIC | Age: 37
End: 2022-10-27

## 2022-10-27 VITALS
SYSTOLIC BLOOD PRESSURE: 120 MMHG | HEIGHT: 66 IN | TEMPERATURE: 98.2 F | BODY MASS INDEX: 35.32 KG/M2 | DIASTOLIC BLOOD PRESSURE: 80 MMHG | OXYGEN SATURATION: 99 % | HEART RATE: 93 BPM | WEIGHT: 219.8 LBS | RESPIRATION RATE: 14 BRPM

## 2022-10-27 DIAGNOSIS — K21.9 GASTROESOPHAGEAL REFLUX DISEASE WITHOUT ESOPHAGITIS: ICD-10-CM

## 2022-10-27 DIAGNOSIS — K59.04 CHRONIC IDIOPATHIC CONSTIPATION: ICD-10-CM

## 2022-10-27 DIAGNOSIS — G43.709 CHRONIC MIGRAINE WITHOUT AURA WITHOUT STATUS MIGRAINOSUS, NOT INTRACTABLE: Primary | ICD-10-CM

## 2022-10-27 DIAGNOSIS — Z23 NEED FOR PROPHYLACTIC VACCINATION AND INOCULATION AGAINST INFLUENZA: ICD-10-CM

## 2022-10-27 PROCEDURE — 90688 IIV4 VACCINE SPLT 0.5 ML IM: CPT | Performed by: INTERNAL MEDICINE

## 2022-10-27 PROCEDURE — 90471 IMMUNIZATION ADMIN: CPT | Performed by: INTERNAL MEDICINE

## 2022-10-27 PROCEDURE — 99214 OFFICE O/P EST MOD 30 MIN: CPT | Performed by: INTERNAL MEDICINE

## 2022-10-27 RX ORDER — TOPIRAMATE 50 MG/1
50 TABLET, FILM COATED ORAL NIGHTLY
Qty: 90 TABLET | Refills: 1 | Status: SHIPPED | OUTPATIENT
Start: 2022-10-27 | End: 2022-12-08

## 2022-10-27 RX ORDER — OMEPRAZOLE 40 MG/1
40 CAPSULE, DELAYED RELEASE ORAL DAILY
Qty: 90 CAPSULE | Refills: 3 | Status: SHIPPED | OUTPATIENT
Start: 2022-10-27 | End: 2023-01-25

## 2022-10-27 RX ORDER — SUMATRIPTAN 100 MG/1
TABLET, FILM COATED ORAL
Qty: 9 TABLET | Refills: 2 | Status: SHIPPED | OUTPATIENT
Start: 2022-10-27

## 2022-10-27 NOTE — PROGRESS NOTES
Answers for HPI/ROS submitted by the patient on 10/20/2022  Please describe your symptoms.: Having trouble going to bathroom. , Need routine bloodwork done.  Have you had these symptoms before?: Yes  How long have you been having these symptoms?: Greater than 2 weeks  What is the primary reason for your visit?: Other        Follow Up Office Visit      Date: 10/27/2022   Patient Name: Ely De La Garza  : 1985   MRN: [id number]     Chief Complaint:    Chief Complaint   Patient presents with   • Headache     Trying to have a baby so went off all meds wants back on meds wants labs  toprimate 50mg and imatex       History of Present Illness: Ely De La Garza is a 37 y.o. female who is here today for several concerns, 1 of which is a history of recurrent migraine headaches, having been diagnosed initially about 18 years ago and having had normal imaging including MRI.  She had previously been on Topamax 50 mg daily prophylaxis which had significant improvement in her control but she stopped the medication about 6 months ago when she was contemplating a pregnancy.  She also had taken some Imitrex for breakthrough headaches.  She is now having daily headaches to some degree on the left temporal region, with more migrainous type severity of pain 2 or 3 times weekly, throbbing, sharp, pulsating, with some nausea and phonophobia, questionable photophobia, no focal neurological symptoms otherwise.  The headaches are bothersome to the point that she now is not going to consider pregnancy and would like to get back on her Topamax prevention.  She also had a history of hypertension and had stop valsartan/HCTZ 160/25 mg daily when she was try to get pregnant, but notes since she has been off the medication that her blood pressures have remained normal in the 120/80 range.  No chest pains palpitations dyspnea dizziness or edema.  Her other complaint relates to chronic GERD, currently taking omeprazole 20 mg 2 tablets  daily.  When she tries to reduce the dose down to 1 tablet daily she has recurrent symptoms.  She only rarely drinks any caffeine, no significant NSAIDs, rare alcohol, no alcohol.  Finally she has a longstanding history of chronic constipation typically having hard stools once a week, with some secondary bloating, no melena or hematochezia, generally eats a healthy diet with fruits and some vegetables.  Previously has tried MiraLAX but it makes her nauseated and she has no clinical response to stool softeners.  She is status post gastric sleeve bariatric surgery approximately 2 years ago and has lost approximately 80 pounds in weight from her surgical weight.    Subjective      Review of Systems:   Review of Systems    I have reviewed the patients family history, social history, past medical history, past surgical history and have updated it as appropriate.     Medications:     Current Outpatient Medications:   •  promethazine (PHENERGAN) 25 MG tablet, Take 1 tablet by mouth Every 6 (Six) Hours As Needed for Nausea or Vomiting., Disp: 10 tablet, Rfl: 0  •  folic acid (FOLVITE) 1 MG tablet, Take 1 mg by mouth Daily. 1 daily, Disp: , Rfl:   •  linaclotide (Linzess) 145 MCG capsule capsule, Take 1 capsule by mouth Every Morning Before Breakfast., Disp: 90 capsule, Rfl: 3  •  omeprazole (priLOSEC) 40 MG capsule, Take 1 capsule by mouth Daily for 90 days., Disp: 90 capsule, Rfl: 3  •  SUMAtriptan (Imitrex) 100 MG tablet, Take 1/2- one tablet at onset of headache. May repeat dose one time in 2 hours if headache not relieved., Disp: 9 tablet, Rfl: 2  •  topiramate (Topamax) 50 MG tablet, Take 1 tablet by mouth Every Night., Disp: 90 tablet, Rfl: 1    Allergies:   No Known Allergies    Objective     Physical Exam: Please see above  Vital Signs:   Vitals:    10/27/22 1006   BP: 120/80   BP Location: Left arm   Patient Position: Sitting   Cuff Size: Adult   Pulse: 93   Resp: 14   Temp: 98.2 °F (36.8 °C)   TempSrc: Temporal  "  SpO2: 99%   Weight: 99.7 kg (219 lb 12.8 oz)   Height: 167.6 cm (66\")     Body mass index is 35.48 kg/m².  Class 2 Severe Obesity (BMI >=35 and <=39.9). Obesity-related health conditions include the following: GERD. Obesity is improving with treatment. BMI is is above average; BMI management plan is completed. We discussed low calorie, low carb based diet program, portion control, increasing exercise and consulting a Bariatric surgeon.       Physical Exam  General: Very pleasant healthy-appearing 37-year-old white female with a BMI of 35.4, no acute distress.  Head neck: Normocephalic/atraumatic, with some mild sensitivity palpation of her left temporal scalp, no visible abnormalities otherwise, pupils equally round reactive to light with fundi clear, sharp disc margins, EOMI, TMs and canals bilaterally clear, nares clear, sinuses nontender, oropharynx is clear with good dentition and moist membranes, neck supple with no adenopathy masses or tenderness  Lungs are clear with no wheeze tachypnea or cough  Cardiac regular rate rhythm with no murmurs gallops or rubs  Abdomen mildly obese soft and nontender with no organomegaly or masses and normal bowel sounds  Neurological exam grossly normal  Procedures    Results:   Labs:   No results found for: HGBA1C, CMP, CBCDIFFPANEL, CREAT, TSH     POCT Results (if applicable):   Results for orders placed or performed in visit on 09/12/22    MAMMOGRAPHY   Result Value Ref Range     Mammogram SEE REPORT        Imaging:   No valid procedures specified.       Assessment / Plan      Assessment/Plan:   Diagnoses and all orders for this visit:    1. Chronic migraine without aura without status migrainosus, not intractable (Primary)  -     topiramate (Topamax) 50 MG tablet; Take 1 tablet by mouth Every Night.  Dispense: 90 tablet; Refill: 1  -     SUMAtriptan (Imitrex) 100 MG tablet; Take 1/2- one tablet at onset of headache. May repeat dose one time in 2 hours if headache not " relieved.  Dispense: 9 tablet; Refill: 2  Recurrent daily migraine headaches since stopping her previous Topamax therapy about 6 months ago, this having been stopped given contemplation of a pregnancy.  She is now not going to pursue pregnancy we will reinitiate Topamax 50 mg initially at 1/2 tablet nightly then increase after week to 1 tablet nightly at her previous target dose, noting side effect profile potential including some cloudiness of thinking and paresthesias.  Also prescribe her Imitrex to use as needed.  Reassess clinically in 6 weeks and if still not improving we will increase the dose of Topamax further.  2. Gastroesophageal reflux disease without esophagitis  -     omeprazole (priLOSEC) 40 MG capsule; Take 1 capsule by mouth Daily for 90 days.  Dispense: 90 capsule; Refill: 3  Does well on omeprazole 20 mg taking 2 tablets daily.  Regular symptoms this reduces dose down to 1 tablet daily, noting she does avoid GI irritants.  For convenience we will switch her omeprazole 20 mg taking 2 tablets over to omeprazole 40 mg 1 tablet daily.  3. Chronic idiopathic constipation  -     linaclotide (Linzess) 145 MCG capsule capsule; Take 1 capsule by mouth Every Morning Before Breakfast.  Dispense: 90 capsule; Refill: 3  Longstanding problem.  Discussed healthy lifestyle diet rich in fruits and vegetables minus bananas, drinking plenty water, exercising regularly, noting she previously had an intolerance to MiraLAX causing nausea.  We will subsequently initiate trial of Linzess 145 mcg daily.  Adjust dose as needed subsequently  4. Need for prophylactic vaccination and inoculation against influenza  -     Fluzone Split Quad (Multi-dose)        Vaccine Counseling:  “Discussed risks/benefits to vaccination, reviewed components of the vaccine, discussed VIS, discussed informed consent, informed consent obtained. Patient/Parent was allowed to accept or refuse vaccine. Questions answered to satisfactory state of  patient/Parent. We reviewed typical age appropriate and seasonally appropriate vaccinations. Reviewed immunization history and updated state vaccination form as needed. Patient was counseled on Influenza      Follow Up:   Return in about 6 weeks (around 12/8/2022) for Recheck.      At [facility], we believe that sharing information builds trust and better relationships. You are receiving this note because you recently visited [facility]. It is possible you will see health information before a provider has talked with you about it. This kind of information can be easy to misunderstand. To help you fully understand what it means for your health, we urge you to discuss this note with your provider.    Harley Rodriguez MD  [facility] [place]

## 2022-11-02 ENCOUNTER — OFFICE VISIT (OUTPATIENT)
Dept: OBSTETRICS AND GYNECOLOGY | Facility: CLINIC | Age: 37
End: 2022-11-02

## 2022-11-02 VITALS
DIASTOLIC BLOOD PRESSURE: 84 MMHG | WEIGHT: 218.4 LBS | SYSTOLIC BLOOD PRESSURE: 136 MMHG | BODY MASS INDEX: 35.1 KG/M2 | HEIGHT: 66 IN

## 2022-11-02 DIAGNOSIS — Z30.430 ENCOUNTER FOR INSERTION OF MIRENA IUD: Primary | ICD-10-CM

## 2022-11-02 LAB
B-HCG UR QL: NEGATIVE
EXPIRATION DATE: NORMAL
INTERNAL NEGATIVE CONTROL: NEGATIVE
INTERNAL POSITIVE CONTROL: POSITIVE
Lab: NORMAL

## 2022-11-02 PROCEDURE — 81025 URINE PREGNANCY TEST: CPT | Performed by: NURSE PRACTITIONER

## 2022-11-02 PROCEDURE — 58300 INSERT INTRAUTERINE DEVICE: CPT | Performed by: NURSE PRACTITIONER

## 2022-11-02 NOTE — PROGRESS NOTES
Chief Complaint   Patient presents with   • Contraception     Discuss Mirena          Subjective   HPI  Ely De La Garza is a 37 y.o. female, , who wants to have another Mirena inserted today.  She has had a Mirena which she liked but had it removed on 2022 to conceive. Patient is no longer interested in trying to conceive. Patient would like to have Mirena reinserted.     The patient's current method of contraception is contraceptive methods: None.  She would like to discuss IUD for birth control. The patient reports additional symptoms as none. Her periods are regular every 28-30 days, lasting 5-7 days. Dysmenorrhea:mild, occurring first 1-2 days of flow. Partner Status: Marital Status: . She is sexually active. She has not had new partners.    Additional OB/GYN History   Thromboembolic Disease: none  History of hypertension: yes  History of migraines: preceded by an aura consisting of ear ache and pain behind eye  Tobacco Usage?: No   Age of menarche: 13    Last Pap :   Last Completed Pap Smear          PAP SMEAR (Every 3 Years) Next due on 2022  Pap IG, Rfx HPV ASCU    2021  Pap IG, HPV-hr    2019  Done - Negative              History of abnormal Pap smear: no      Current Outpatient Medications:   •  linaclotide (Linzess) 145 MCG capsule capsule, Take 1 capsule by mouth Every Morning Before Breakfast., Disp: 90 capsule, Rfl: 3  •  omeprazole (priLOSEC) 40 MG capsule, Take 1 capsule by mouth Daily for 90 days., Disp: 90 capsule, Rfl: 3  •  promethazine (PHENERGAN) 25 MG tablet, Take 1 tablet by mouth Every 6 (Six) Hours As Needed for Nausea or Vomiting., Disp: 10 tablet, Rfl: 0  •  SUMAtriptan (Imitrex) 100 MG tablet, Take 1/2- one tablet at onset of headache. May repeat dose one time in 2 hours if headache not relieved., Disp: 9 tablet, Rfl: 2  •  topiramate (Topamax) 50 MG tablet, Take 1 tablet by mouth Every Night., Disp: 90 tablet, Rfl: 1  •   folic acid (FOLVITE) 1 MG tablet, Take 1 mg by mouth Daily. 1 daily, Disp: , Rfl:   No current facility-administered medications for this visit.     Past Medical History:   Diagnosis Date   • Abnormal findings on diagnostic imaging of breast    • Contact with and (suspected) exposure to other viral communicable diseases    • Cutaneous abscess of back (any part, except buttock)    • Depression    • Diarrhea    • Encounter for insertion of mirena IUD 2019    Due for removal 2025   • Encounter for observation for suspected exposure to other biological agents ruled out    • Esophageal reflux    • Fever    • Flu    • Generalized anxiety disorder    • GERD (gastroesophageal reflux disease)    • Goiter     Pt states resolved   • Headache    • Herpes zoster    • History of COVID-19    • Lymphedema    • Migraine without aura, not intractable, without status migrainosus    • Mixed hyperlipidemia    • Morbid obesity (HCC)    • Nausea and vomiting    • Obese    • Other injury of unspecified body region, initial encounter    • Panic disorder    • Paresthesia of skin    • Polycystic ovaries    • PONV (postoperative nausea and vomiting)    • Prediabetes    • Sacrococcygeal disorders, not elsewhere classified    • Subacute cough    • Trochanteric bursitis of left hip    • Type 2 diabetes mellitus (HCC)    • Viral infection    • Wears contact lenses    • Wears eyeglasses         Past Surgical History:   Procedure Laterality Date   • BODY LIFT N/A 2017    Procedure: CIRCUMFERENTIAL BODY LIFT;  Surgeon: Enrique Leroy MD;  Location:  [facility];  Service:    • BODY LIFT Bilateral 2019    Procedure: BILATERAL THIGH  LIFT WITH LIPOSUCTION;  Surgeon: Enrique Leroy MD;  Location:  [facility];  Service: Plastics   • BREAST AUGMENTATION     •  SECTION     • GASTRIC SLEEVE LAPAROSCOPIC  08/10/2020   • INTRAUTERINE DEVICE INSERTION  2019    Mirena; due for removal 2025   • LIPOMA EXCISION     •  LIPOSUCTION ABDOMINAL N/A 2/14/2019    Procedure: LIPOSUCTION ABDOMINAL;  Surgeon: Enrique Leroy MD;  Location: [facility];  Service: Plastics   • LUNG BIOPSY     • OTHER SURGICAL HISTORY      Back Lift   • TONSILLECTOMY            Gynecologic Procedure Note        Procedure: IUD Insertion     Procedures    Pre procedure indication 1) Desires Mirena  Post procedure indication 1) Desires Mirena    NDC: Mirena 15271-698-78  Lot #: [id number]  Exp Date: 04/2024   device    The risks, benefits, and alternatives to Mirena were explained at length with the patient. All her questions were answered and consents were signed.  Her LMP was 10/24/2022 . Urine Pregnancy Test was Negative. Patient denies any unprotected intercourse in the last 2 weeks. Patient does not have an allergy to betadine or shellfish.    The patient was placed in a dorsal lithotomy position on the examining table in [facility]. A bimanual exam confirmed the uterus was anteverted. A speculum was inserted into the vagina and the cervix was brought into view.  The cervix was prepped with Betadine. The anterior lip of the cervix was then grasped with a single-tooth tenaculum. The endometrial cavity was then sounded to 7 centimeters. The sealed Mirena package was opened and the IUD was removed in a sterile fashion.    The upper edge of the depth setting the flange was set at the uterine sound measurement. The  was then carefully advanced to the cervical canal into the uterus to the level of the fundus.  The slider was then retracted about 1 cm and deployed the device. The device was then gently advanced to the fundus. The IUD was then released by pulling the slider down all the way. The  was removed carefully from the uterus. The threads were then cut leaving 2-3 cm visible outside of the cervix.  The single-tooth tenaculum was removed from the anterior lip. Good hemostasis was noted.   All other instruments were removed from the vagina.  "      The patient tolerated the procedure very well with a mild amount of discomfort.  She was monitored for 10 minutes prior to discharge.      There were no complications.    The patient was counseled about the need to return in 4 weeks for IUD check.     She was counseled about the need to use a backup method of contraception such as condoms until her post insertion exam was performed. The patient verbalized understanding when the IUD will need to be removed/replaced. Written information was given to the patient.  The patient is counseled to contact us if she has any significant or increasing bleeding, pain, fever, chills, or other concerns. She is instructed to see a doctor right away if she believes that she may be pregnant at any time with the IUD in place.    CHUCK Cortes  11/02/2022        The additional following portions of the patient's history were reviewed and updated as appropriate: allergies and current medications.    Review of Systems    I have reviewed and agree with the HPI, ROS, and historical information as entered above. Huong Dwyer, APRN    Objective   /84 (BP Location: Left arm, Patient Position: Sitting, Cuff Size: Adult)   Ht 167.6 cm (66\")   Wt 99.1 kg (218 lb 6.4 oz)   LMP 10/24/2022 (Exact Date)   BMI 35.25 kg/m²     Physical Exam  Vitals and nursing note reviewed. Exam conducted with a chaperone present.   Constitutional:       General: She is not in acute distress.     Appearance: Normal appearance. She is not ill-appearing.   Pulmonary:      Effort: Pulmonary effort is normal.   Abdominal:      General: There is no distension.      Palpations: Abdomen is soft. There is no mass.      Tenderness: There is no abdominal tenderness. There is no guarding or rebound.      Hernia: No hernia is present.   Genitourinary:     General: Normal vulva.      Vagina: Normal.      Cervix: Normal.      Uterus: Normal.       Adnexa: Right adnexa normal and left adnexa normal.   Skin:   "   General: Skin is warm and dry.   Neurological:      Mental Status: She is alert and oriented to person, place, and time.   Psychiatric:         Mood and Affect: Mood normal.         Behavior: Behavior normal.         Assessment & Plan     Assessment     Problem List Items Addressed This Visit    None  Visit Diagnoses     Encounter for insertion of mirena IUD    -  Primary    Relevant Medications    Levonorgestrel (MIRENA) 20 MCG/DAY IUD intrauterine device 1 each (Completed)    Other Relevant Orders    POC Pregnancy, Urine (Completed)          Risks, benefits, side effects of Mirena reviewed.  See procedure note.      Huong Dwyer, APRN  11/02/2022

## 2022-11-22 ENCOUNTER — OFFICE VISIT (OUTPATIENT)
Dept: FAMILY MEDICINE CLINIC | Facility: CLINIC | Age: 37
End: 2022-11-22

## 2022-11-22 VITALS
BODY MASS INDEX: 34.78 KG/M2 | HEIGHT: 66 IN | TEMPERATURE: 97.5 F | OXYGEN SATURATION: 98 % | SYSTOLIC BLOOD PRESSURE: 120 MMHG | DIASTOLIC BLOOD PRESSURE: 84 MMHG | HEART RATE: 86 BPM | WEIGHT: 216.4 LBS

## 2022-11-22 DIAGNOSIS — J01.00 ACUTE MAXILLARY SINUSITIS, RECURRENCE NOT SPECIFIED: Primary | ICD-10-CM

## 2022-11-22 DIAGNOSIS — B34.9 VIRAL SYNDROME: ICD-10-CM

## 2022-11-22 DIAGNOSIS — R35.0 URINARY FREQUENCY: ICD-10-CM

## 2022-11-22 LAB
BILIRUB BLD-MCNC: NEGATIVE MG/DL
CLARITY, POC: CLEAR
COLOR UR: YELLOW
EXPIRATION DATE: NORMAL
FLUAV AG UPPER RESP QL IA.RAPID: NOT DETECTED
FLUBV AG UPPER RESP QL IA.RAPID: NOT DETECTED
GLUCOSE UR STRIP-MCNC: NEGATIVE MG/DL
INTERNAL CONTROL: NORMAL
KETONES UR QL: NEGATIVE
LEUKOCYTE EST, POC: NEGATIVE
Lab: NORMAL
NITRITE UR-MCNC: NEGATIVE MG/ML
PH UR: 7.5 [PH] (ref 5–8)
PROT UR STRIP-MCNC: NEGATIVE MG/DL
RBC # UR STRIP: NEGATIVE /UL
SARS-COV-2 RNA RESP QL NAA+PROBE: NOT DETECTED
SP GR UR: 1.01 (ref 1–1.03)
UROBILINOGEN UR QL: NORMAL

## 2022-11-22 PROCEDURE — 81002 URINALYSIS NONAUTO W/O SCOPE: CPT | Performed by: INTERNAL MEDICINE

## 2022-11-22 PROCEDURE — 99214 OFFICE O/P EST MOD 30 MIN: CPT | Performed by: INTERNAL MEDICINE

## 2022-11-22 PROCEDURE — 87428 SARSCOV & INF VIR A&B AG IA: CPT | Performed by: INTERNAL MEDICINE

## 2022-11-22 RX ORDER — BROMPHENIRAMINE MALEATE, PSEUDOEPHEDRINE HYDROCHLORIDE, AND DEXTROMETHORPHAN HYDROBROMIDE 2; 30; 10 MG/5ML; MG/5ML; MG/5ML
SYRUP ORAL
Qty: 240 ML | Refills: 0 | Status: SHIPPED | OUTPATIENT
Start: 2022-11-22 | End: 2023-03-08

## 2022-11-22 RX ORDER — AMOXICILLIN 875 MG/1
875 TABLET, COATED ORAL 2 TIMES DAILY
Qty: 14 TABLET | Refills: 0 | Status: SHIPPED | OUTPATIENT
Start: 2022-11-22 | End: 2022-11-29

## 2022-11-22 NOTE — PROGRESS NOTES
Follow Up Office Visit      Date: 2022   Patient Name: Ely De La Garza  : 1985   MRN: [id number]     Chief Complaint:    Chief Complaint   Patient presents with   • Sinusitis     Burns when breaths in. Green snot       History of Present Illness: Ely De La Garza is a 37 y.o. female who is here today for a 1 week history of progression of rhinorrhea without congestion, initially clear now colored with some blood-tinged, frontal headache, burning in her nose, scratchy throat, slight cough, low-grade fever 99.9, associated for the last week and a half with some diarrheal stools and mild nausea primarily related to postnasal drip.  Unrelated problem patient noting over the last week or 2 that she has had some urinary frequency and dribbling ever since she had her Mirena replaced a couple weeks ago, but no diane dysuria and no hematuria.  Not currently on her menses.  She has had some fatigue.  Has had her flu vaccine this fall, has not yet had the current bivalent COVID booster, non-smoker..    Subjective      Review of Systems:   Review of Systems    I have reviewed the patients family history, social history, past medical history, past surgical history and have updated it as appropriate.     Medications:     Current Outpatient Medications:   •  linaclotide (Linzess) 145 MCG capsule capsule, Take 1 capsule by mouth Every Morning Before Breakfast., Disp: 90 capsule, Rfl: 3  •  omeprazole (priLOSEC) 40 MG capsule, Take 1 capsule by mouth Daily for 90 days., Disp: 90 capsule, Rfl: 3  •  promethazine (PHENERGAN) 25 MG tablet, Take 1 tablet by mouth Every 6 (Six) Hours As Needed for Nausea or Vomiting., Disp: 10 tablet, Rfl: 0  •  SUMAtriptan (Imitrex) 100 MG tablet, Take 1/2- one tablet at onset of headache. May repeat dose one time in 2 hours if headache not relieved., Disp: 9 tablet, Rfl: 2  •  topiramate (Topamax) 50 MG tablet, Take 1 tablet by mouth Every Night., Disp: 90 tablet, Rfl: 1  •   "amoxicillin (AMOXIL) 875 MG tablet, Take 1 tablet by mouth 2 (Two) Times a Day for 7 days., Disp: 14 tablet, Rfl: 0  •  brompheniramine-pseudoephedrine-DM 30-2-10 MG/5ML syrup, 5 to 10 mL orally every 4-6 hours as needed for cold and cough symptoms, Disp: 240 mL, Rfl: 0    Allergies:   No Known Allergies    Objective     Physical Exam: Please see above  Vital Signs:   Vitals:    11/22/22 1313   BP: 120/84   BP Location: Left arm   Patient Position: Sitting   Cuff Size: Adult   Pulse: 86   Temp: 97.5 °F (36.4 °C)   TempSrc: Temporal   SpO2: 98%   Weight: 98.2 kg (216 lb 6.4 oz)   Height: 167.6 cm (66\")     Body mass index is 34.93 kg/m².          Physical Exam  General: Overall healthy-appearing young white female in no acute distress.  Hydrated.  Head and neck: TMs and canals bilaterally clear, nares mild congestion with red turbinates, minimal clear mucus, maxillary sinuses bilaterally tender, oropharynx with mild irritative posterior erythema with no exudate or petechiae and good dentition, moist membranes, neck supple with slight upper anterior cervical tenderness but no adenopathy or masses  Lungs clear with no wheeze tachypnea dyspnea or cough  Cardiac regular rate rhythm with no murmurs gallops or rubs  Abdomen mildly obese and soft throughout with mild suprapubic discomfort to deep palpation, nontender elsewhere, no rebound or guard, no organomegaly or masses, normal active bowel sounds  Procedures    Results:   Labs:   No results found for: HGBA1C, CMP, CBCDIFFPANEL, CREAT, TSH     POCT Results (if applicable):     Results for orders placed or performed in visit on 11/22/22   Covid-19 + Flu A&B AG, Veritor    Specimen: Swab   Result Value Ref Range    COVID19 Not Detected Not Detected - Ref. Range    Influenza A Antigen DEVAN Not Detected Not Detected    Influenza B Antigen DEVAN Not Detected Not Detected    Internal Control Passed Passed    Lot Number [id number]     Expiration Date 03/09/2023    POC Urinalysis " Dipstick    Specimen: Urine   Result Value Ref Range    Color Yellow Yellow, Straw, Dark Yellow, Kailyn    Clarity, UA Clear Clear    Glucose, UA Negative Negative mg/dL    Bilirubin Negative Negative    Ketones, UA Negative Negative    Specific Gravity  1.010 1.005 - 1.030    Blood, UA Negative Negative    pH, Urine 7.5 5.0 - 8.0    Protein, POC Negative Negative mg/dL    Urobilinogen, UA Normal Normal, 0.2 E.U./dL    Leukocytes Negative Negative    Nitrite, UA Negative Negative     Imaging:   No valid procedures specified.       Assessment / Plan      Assessment/Plan:   Diagnoses and all orders for this visit:    1. Acute maxillary sinusitis, recurrence not specified (Primary)  -     amoxicillin (AMOXIL) 875 MG tablet; Take 1 tablet by mouth 2 (Two) Times a Day for 7 days.  Dispense: 14 tablet; Refill: 0  -     brompheniramine-pseudoephedrine-DM 30-2-10 MG/5ML syrup; 5 to 10 mL orally every 4-6 hours as needed for cold and cough symptoms  Dispense: 240 mL; Refill: 0  Treat with amoxicillin along with Bromfed-DM, and plenty of fluids along with Motrin or Tylenol as needed.  Advise if not improving  2. Viral syndrome  -     Covid-19 + Flu A&B AG, Veritor  -     brompheniramine-pseudoephedrine-DM 30-2-10 MG/5ML syrup; 5 to 10 mL orally every 4-6 hours as needed for cold and cough symptoms  Dispense: 240 mL; Refill: 0  Rapid COVID-19 rapid Influenza Type A and type B all negative.  Likely underlying nonspecific viral URI.  Treat as above for secondary acute sinusitis.  3. Urinary frequency  -     POC Urinalysis Dipstick  Urinalysis unremarkable.  Push plenty of fluids.  Advise if not improving.    Follow Up:   Return if symptoms worsen or fail to improve.      At [facility], we believe that sharing information builds trust and better relationships. You are receiving this note because you recently visited [facility]. It is possible you will see health information before a provider has talked with you about it.  This kind of information can be easy to misunderstand. To help you fully understand what it means for your health, we urge you to discuss this note with your provider.    Harley Rodriguez MD  [facility] [place]

## 2022-11-23 ENCOUNTER — PATIENT ROUNDING (BHMG ONLY) (OUTPATIENT)
Dept: FAMILY MEDICINE CLINIC | Facility: CLINIC | Age: 37
End: 2022-11-23

## 2022-11-23 NOTE — PROGRESS NOTES
.A [employer] message has been sent to the patient for patient rounding with [facility].

## 2022-11-29 DIAGNOSIS — R11.2 NAUSEA AND VOMITING, UNSPECIFIED VOMITING TYPE: ICD-10-CM

## 2022-11-30 RX ORDER — PROMETHAZINE HYDROCHLORIDE 25 MG/1
TABLET ORAL
Qty: 10 TABLET | Refills: 0 | Status: SHIPPED | OUTPATIENT
Start: 2022-11-30 | End: 2023-03-08

## 2022-12-08 ENCOUNTER — OFFICE VISIT (OUTPATIENT)
Dept: FAMILY MEDICINE CLINIC | Facility: CLINIC | Age: 37
End: 2022-12-08

## 2022-12-08 VITALS
WEIGHT: 210.2 LBS | SYSTOLIC BLOOD PRESSURE: 122 MMHG | TEMPERATURE: 97.3 F | OXYGEN SATURATION: 98 % | BODY MASS INDEX: 33.78 KG/M2 | HEART RATE: 84 BPM | DIASTOLIC BLOOD PRESSURE: 82 MMHG | HEIGHT: 66 IN

## 2022-12-08 DIAGNOSIS — K21.9 GASTROESOPHAGEAL REFLUX DISEASE WITHOUT ESOPHAGITIS: ICD-10-CM

## 2022-12-08 DIAGNOSIS — F33.41 RECURRENT MAJOR DEPRESSIVE DISORDER, IN PARTIAL REMISSION: ICD-10-CM

## 2022-12-08 DIAGNOSIS — G43.109 MIGRAINE WITH AURA AND WITHOUT STATUS MIGRAINOSUS, NOT INTRACTABLE: Primary | ICD-10-CM

## 2022-12-08 DIAGNOSIS — F41.9 ANXIETY: ICD-10-CM

## 2022-12-08 PROCEDURE — 99214 OFFICE O/P EST MOD 30 MIN: CPT | Performed by: INTERNAL MEDICINE

## 2022-12-08 RX ORDER — TRAZODONE HYDROCHLORIDE 50 MG/1
50 TABLET ORAL
COMMUNITY
Start: 2022-12-05

## 2022-12-08 RX ORDER — SUMATRIPTAN 50 MG/1
TABLET, FILM COATED ORAL
COMMUNITY
Start: 2022-11-30 | End: 2022-12-08

## 2022-12-08 RX ORDER — TOPIRAMATE 100 MG/1
100 TABLET, FILM COATED ORAL 2 TIMES DAILY
Qty: 30 TABLET | Refills: 1 | Status: SHIPPED | OUTPATIENT
Start: 2022-12-08 | End: 2023-03-17

## 2022-12-08 RX ORDER — PAROXETINE 10 MG/1
10 TABLET, FILM COATED ORAL DAILY
COMMUNITY
Start: 2022-12-05

## 2022-12-15 ENCOUNTER — TELEPHONE (OUTPATIENT)
Dept: FAMILY MEDICINE CLINIC | Facility: CLINIC | Age: 37
End: 2022-12-15

## 2022-12-15 NOTE — TELEPHONE ENCOUNTER
Caller: Ely De La Garza    Relationship: Self     Best call back number: [phone]    What medications are you currently taking:   Current Outpatient Medications on File Prior to Visit   Medication Sig Dispense Refill   • brompheniramine-pseudoephedrine-DM 30-2-10 MG/5ML syrup 5 to 10 mL orally every 4-6 hours as needed for cold and cough symptoms 240 mL 0   • linaclotide (Linzess) 145 MCG capsule capsule Take 1 capsule by mouth Every Morning Before Breakfast. 90 capsule 3   • omeprazole (priLOSEC) 40 MG capsule Take 1 capsule by mouth Daily for 90 days. 90 capsule 3   • PARoxetine (PAXIL) 10 MG tablet Take 10 mg by mouth Daily.     • promethazine (PHENERGAN) 25 MG tablet TAKE 1 TABLET BY MOUTH EVERY 6 HOURS AS NEEDED FOR NAUSEA FOR VOMITING 10 tablet 0   • SUMAtriptan (Imitrex) 100 MG tablet Take 1/2- one tablet at onset of headache. May repeat dose one time in 2 hours if headache not relieved. 9 tablet 2   • topiramate (Topamax) 100 MG tablet Take 1 tablet by mouth 2 (Two) Times a Day. 30 tablet 1   • traZODone (DESYREL) 50 MG tablet Take 50 mg by mouth every night at bedtime.       No current facility-administered medications on file prior to visit.        Which medication are you concerned about:   TOMPIRAMATE - 100 MG     IS SHE SUPPOSED TO TAKE TWO A DAY OR ONE A DAY?    What are your concerns:     IS SHE SUPPOSED TO TAKE TWO A DAY OR ONE A DAY OF  MG?    SHE HAD BEEN TAKING TWO OF THE 50 MG.  PLEASE CALL TO EXPLAIN

## 2022-12-15 NOTE — TELEPHONE ENCOUNTER
I have checked with Dr. Souza and he wants her to take one 100 mg at night. I have let her know this. TF

## 2023-01-13 NOTE — PROGRESS NOTES
Case Management Discharge Note    Final Note: Plan is home with spouse.  No needs.    Destination      No service has been selected for the patient.      Durable Medical Equipment      No service has been selected for the patient.      Dialysis/Infusion      No service has been selected for the patient.      Home Medical Care      No service has been selected for the patient.      Community Resources      No service has been selected for the patient.             Final Discharge Disposition Code: 01 - home or self-care   21

## 2023-01-26 ENCOUNTER — HOSPITAL ENCOUNTER (OUTPATIENT)
Dept: HOSPITAL 22 - PT | Age: 38
LOS: 33 days | Discharge: HOME | End: 2023-02-28
Payer: COMMERCIAL

## 2023-01-26 DIAGNOSIS — S73.192A: Primary | ICD-10-CM

## 2023-01-26 PROCEDURE — 97163 PT EVAL HIGH COMPLEX 45 MIN: CPT

## 2023-01-26 PROCEDURE — 97110 THERAPEUTIC EXERCISES: CPT

## 2023-01-26 PROCEDURE — 97530 THERAPEUTIC ACTIVITIES: CPT

## 2023-02-24 ENCOUNTER — TELEPHONE (OUTPATIENT)
Dept: FAMILY MEDICINE CLINIC | Facility: CLINIC | Age: 38
End: 2023-02-24
Payer: COMMERCIAL

## 2023-02-24 NOTE — TELEPHONE ENCOUNTER
Request by patient need to submit order for bilateral screening mammogram, though upon review of medical records looks like she had her initial bilateral screening mammogram on 4/21/2022, this having been obtained given bilateral breast implants, with subsequent recommendation for spot compression view of the right breast given some microcalcifications, the spot compression view having been obtained on 4/26/2022 with the calcifications felt to be cutaneous with a resumption of bilateral screening mammogram in 1 year which would correlate to 4/21/2023, 2 months henceforth.  I advised patient that she really is not due for bilateral screening mammogram until mid to late 4/2023.  She will notify us at that time to obtain the requested order

## 2023-02-24 NOTE — TELEPHONE ENCOUNTER
Caller: Ely De La Garza    Relationship: Self    Best call back number: [phone]    What orders are you requesting (i.e. lab or imaging): MAMMOGRAM    In what timeframe would the patient need to come in: AS SOON AS POSSIBLE    Where will you receive your lab/imaging services: [facility]     Additional notes: THE PATIENT STATES THAT SHE NEEDS AN ORDER FOR AN ANNUAL MAMMOGRAM.    PLEASE CALL THE PATIENT TO ADVISE AS SOON AS POSSIBLE.     THANK YOU.

## 2023-02-28 ENCOUNTER — TELEPHONE (OUTPATIENT)
Dept: OBSTETRICS AND GYNECOLOGY | Facility: CLINIC | Age: 38
End: 2023-02-28
Payer: COMMERCIAL

## 2023-02-28 NOTE — TELEPHONE ENCOUNTER
Pt needs to come in and see Huong and have an u/s done for the pelvic pain and to  check IUD.Pt v/u. She is coming in 03/08. Patient states the bleeding is not soaking through a tampon/pad every hour.

## 2023-02-28 NOTE — TELEPHONE ENCOUNTER
Caller: Ely De La Garza    Relationship to patient: Self    Best call back number: [phone]      PT STATES SHE HAD IUD PLACED ON . PT STATES HER CYCLES HAVE BEEN REG SINCE HOWEVER, THIS MONTH SHE HAS BEEN ON A CYCLE THE WHOLE MONTH.  BRIGHT RED BLOOD.  THERE ARE ONE TO TWO DAY BREAKS IN BETWEEN.  PT STATES SHE IS HAVING LOWER ABDOMINAL PAIN TO THE TOUCH.  PLEASE CALL PT BACK AT ANYTIME AND CAN LVM.

## 2023-03-08 ENCOUNTER — OFFICE VISIT (OUTPATIENT)
Dept: OBSTETRICS AND GYNECOLOGY | Facility: CLINIC | Age: 38
End: 2023-03-08
Payer: COMMERCIAL

## 2023-03-08 VITALS
BODY MASS INDEX: 32.72 KG/M2 | HEIGHT: 66 IN | DIASTOLIC BLOOD PRESSURE: 82 MMHG | WEIGHT: 203.6 LBS | SYSTOLIC BLOOD PRESSURE: 120 MMHG

## 2023-03-08 DIAGNOSIS — N89.8 VAGINAL ODOR: ICD-10-CM

## 2023-03-08 DIAGNOSIS — N92.1 BREAKTHROUGH BLEEDING ASSOCIATED WITH INTRAUTERINE DEVICE (IUD): ICD-10-CM

## 2023-03-08 DIAGNOSIS — Z97.5 BREAKTHROUGH BLEEDING ASSOCIATED WITH INTRAUTERINE DEVICE (IUD): ICD-10-CM

## 2023-03-08 DIAGNOSIS — Z30.09 ENCOUNTER FOR OTHER GENERAL COUNSELING OR ADVICE ON CONTRACEPTION: ICD-10-CM

## 2023-03-08 DIAGNOSIS — R10.2 PELVIC PAIN: ICD-10-CM

## 2023-03-08 DIAGNOSIS — Z30.431 IUD CHECK UP: Primary | ICD-10-CM

## 2023-03-08 DIAGNOSIS — Z30.432 ENCOUNTER FOR IUD REMOVAL: ICD-10-CM

## 2023-03-08 LAB — WET PREP GENITAL: NEGATIVE

## 2023-03-08 PROCEDURE — 87210 SMEAR WET MOUNT SALINE/INK: CPT | Performed by: NURSE PRACTITIONER

## 2023-03-08 PROCEDURE — 99213 OFFICE O/P EST LOW 20 MIN: CPT | Performed by: NURSE PRACTITIONER

## 2023-03-08 PROCEDURE — 58301 REMOVE INTRAUTERINE DEVICE: CPT | Performed by: NURSE PRACTITIONER

## 2023-03-08 NOTE — PROGRESS NOTES
"    Chief Complaint   Patient presents with   • Contraception   • Pelvic Pain   • abnormal uteerine bleeding         Subjective   HPI  Ely De La Garza is a 37 y.o. female, .  She had a Mirena placed on 2022.  In 2023, she began having heavy bleeding and generalized pelvic and back pain.  The bleeding is now light and dark brown. She describes the pelvic pain as sharp and stabbing and 8/10. She reports heat makes the pain better with lying down. She states last night she had to take a prescribed pain pill from when she had her hip surgery. Over the counter pain medications are not helping. She reports moving around makes the pain worse.     The additional following portions of the patient's history were reviewed and updated as appropriate: allergies, current medications, past family history, past medical history, past social history, past surgical history and problem list.    Did the patient have u/s today? Yes.  Findings showed IUD had correct placement.  I have personally evaluated the U/S and agree with the findings. Huong Dwyer, APRN    Review of Systems   Genitourinary: Positive for menstrual problem and pelvic pain.   All other systems reviewed and are negative.    All other systems reviewed and are negative.     I have reviewed and agree with the HPI, ROS, and historical information as entered above. Huong Dwyer, APRN    Objective   /82   Ht 167.6 cm (65.98\")   Wt 92.4 kg (203 lb 9.6 oz)   LMP 2023 (Within Days)   BMI 32.88 kg/m²     Physical Exam  Vitals and nursing note reviewed. Exam conducted with a chaperone present.   Constitutional:       General: She is not in acute distress.     Appearance: Normal appearance. She is not ill-appearing.   Pulmonary:      Effort: Pulmonary effort is normal. No respiratory distress.   Abdominal:      Palpations: Abdomen is soft. There is no mass.      Tenderness: There is no abdominal tenderness. There is no guarding or rebound.     "  Hernia: No hernia is present.   Genitourinary:     General: Normal vulva.      Vagina: Normal.      Cervix: Normal.      Uterus: Normal.       Adnexa: Right adnexa normal and left adnexa normal.      Comments: Small amt brown blood.  IUD strings noted at approx 3 cm.  Removed; see procedure note  Skin:     General: Skin is warm and dry.   Neurological:      Mental Status: She is alert and oriented to person, place, and time.   Psychiatric:         Mood and Affect: Mood normal.         Behavior: Behavior normal.         Assessment & Plan     Assessment     Problem List Items Addressed This Visit    None  Visit Diagnoses     IUD check up    -  Primary    Relevant Orders    US Non-ob Transvaginal    Vaginal odor        Relevant Orders    POC Wet Prep (Completed)    Pelvic pain        Breakthrough bleeding associated with intrauterine device (IUD)        Encounter for IUD removal        Encounter for other general counseling or advice on contraception              Plan     1. D/w pt US shows IUD in correct position and wnl also otherwise.  No sign of vaginal infection.  She declines STD testing.  IUD removed per pt request; she declines other Rx for contraception.    Return if symptoms worsen or fail to improve, for Next scheduled follow up.    IUD Removal Procedure Note    Procedures    Type of IUD:  Mirena   Date of insertion:  11/2022  Reason for removal:  Side effect: pain and bleeding    Procedure Time Out Documentation    Procedure Details  IUD strings visible:  yes  Removal:  IUD strings grasped and IUD removed intact with gentle traction.  The patient tolerated the procedure well.    All appropriate instructions regarding removal were reviewed.    Tolerated well  No apparent complications  Post procedure diagnosis : IUD removal     Plans for contraception:  unsure    The patient was advised to call for any fever or for prolonged or severe pain or bleeding. She was advised to use motrin as needed for mild to  moderate pain.     Huong Dwyer, APRN  03/08/2023

## 2023-03-17 DIAGNOSIS — G43.109 MIGRAINE WITH AURA AND WITHOUT STATUS MIGRAINOSUS, NOT INTRACTABLE: ICD-10-CM

## 2023-03-17 RX ORDER — TOPIRAMATE 100 MG/1
TABLET, FILM COATED ORAL
Qty: 30 TABLET | Refills: 0 | Status: SHIPPED | OUTPATIENT
Start: 2023-03-17

## 2023-03-17 NOTE — TELEPHONE ENCOUNTER
Was last seen in December for this rx. She was to have a follow up in 6 weeks. I have spoke with her and have ;let her know she is due for a follow up regarding this. TF

## 2023-04-12 ENCOUNTER — OFFICE VISIT (OUTPATIENT)
Dept: FAMILY MEDICINE CLINIC | Facility: CLINIC | Age: 38
End: 2023-04-12
Payer: COMMERCIAL

## 2023-04-12 VITALS
OXYGEN SATURATION: 98 % | TEMPERATURE: 98.1 F | WEIGHT: 191.2 LBS | DIASTOLIC BLOOD PRESSURE: 74 MMHG | HEART RATE: 74 BPM | SYSTOLIC BLOOD PRESSURE: 122 MMHG | BODY MASS INDEX: 30.73 KG/M2 | HEIGHT: 66 IN | RESPIRATION RATE: 18 BRPM

## 2023-04-12 DIAGNOSIS — N30.01 ACUTE CYSTITIS WITH HEMATURIA: ICD-10-CM

## 2023-04-12 DIAGNOSIS — R30.0 DYSURIA: Primary | ICD-10-CM

## 2023-04-12 LAB
BILIRUB BLD-MCNC: NEGATIVE MG/DL
CLARITY, POC: ABNORMAL
COLOR UR: ABNORMAL
GLUCOSE UR STRIP-MCNC: NEGATIVE MG/DL
KETONES UR QL: NEGATIVE
LEUKOCYTE EST, POC: ABNORMAL
NITRITE UR-MCNC: NEGATIVE MG/ML
PH UR: 6 [PH] (ref 5–8)
PROT UR STRIP-MCNC: ABNORMAL MG/DL
RBC # UR STRIP: ABNORMAL /UL
SP GR UR: 1.01 (ref 1–1.03)
UROBILINOGEN UR QL: ABNORMAL

## 2023-04-12 PROCEDURE — 99213 OFFICE O/P EST LOW 20 MIN: CPT | Performed by: NURSE PRACTITIONER

## 2023-04-12 PROCEDURE — 81002 URINALYSIS NONAUTO W/O SCOPE: CPT | Performed by: NURSE PRACTITIONER

## 2023-04-12 RX ORDER — CIPROFLOXACIN 500 MG/1
500 TABLET, FILM COATED ORAL 2 TIMES DAILY
Qty: 14 TABLET | Refills: 0 | Status: SHIPPED | OUTPATIENT
Start: 2023-04-12 | End: 2023-04-19

## 2023-04-12 RX ORDER — TRAMADOL HYDROCHLORIDE 50 MG/1
50 TABLET ORAL EVERY 6 HOURS PRN
Qty: 10 TABLET | Refills: 0 | Status: SHIPPED | OUTPATIENT
Start: 2023-04-12

## 2023-04-12 RX ORDER — PHENAZOPYRIDINE HYDROCHLORIDE 200 MG/1
200 TABLET, FILM COATED ORAL 3 TIMES DAILY PRN
Qty: 9 TABLET | Refills: 0 | Status: SHIPPED | OUTPATIENT
Start: 2023-04-12

## 2023-04-13 PROBLEM — N30.01 ACUTE CYSTITIS WITH HEMATURIA: Status: ACTIVE | Noted: 2023-04-13

## 2023-04-13 NOTE — ASSESSMENT & PLAN NOTE
Awakened this morning with severe pain and discomfort of her lower back.. Additional urinary complaints include hematuria, dysuria, pyuria, frequency and urgency. Urinalysis showing +leukocyte and blood. Given her level of pain there is possibility of renal stone, no prior history. Will treat with cipro for UTI. Discussed fact there could be stone involvement given her significant pain as well. Advised if decreased UOP or anuria develops she is to report to ED for further evaluation due to urinary obstruction. If no improvement 48 hours after starting antibiotic will obtain imaging to r/o renal stone.   Treat leukolcytes with cipro. Pyridium given for symptoms management as well as tramadol.

## 2023-04-13 NOTE — PROGRESS NOTES
Office Note     Name: Ely De La Garza    : 1985     MRN: [id number]     Chief Complaint  Dysuria    Subjective     History of Present Illness:  Ely De La Garza is a 37 y.o. female who presents today for complaints of pyuria, dysuria, hematuria, urgency, frequency and low back pain since awakening at 4am. She has taken warm baths without relief. She had a percocet left over from a recent surgery she took with good benefit. She denies any fever, chills, decreased UOP. She has no history of nephrolithiasis. Reports good water intake. No further complaints and concerns.     Review of Systems   Constitutional: Negative for chills and fever.   Cardiovascular: Negative for leg swelling.   Gastrointestinal: Negative for abdominal pain, constipation, diarrhea, nausea and vomiting.   Genitourinary: Positive for dysuria, flank pain, frequency, hematuria and urgency. Negative for decreased urine volume.   Musculoskeletal: Negative for arthralgias and myalgias.       Objective     Past Medical History:   Diagnosis Date   • Abnormal findings on diagnostic imaging of breast    • Contact with and (suspected) exposure to other viral communicable diseases    • Cutaneous abscess of back (any part, except buttock)    • Depression    • Diarrhea    • Encounter for insertion of mirena IUD 2019    Due for removal 2025   • Encounter for observation for suspected exposure to other biological agents ruled out    • Esophageal reflux    • Fever    • Flu    • Generalized anxiety disorder    • GERD (gastroesophageal reflux disease)    • Goiter     Pt states resolved   • Headache    • Herpes zoster    • History of COVID-19    • Lymphedema    • Migraine without aura, not intractable, without status migrainosus    • Mixed hyperlipidemia    • Morbid obesity    • Nausea and vomiting    • Obese    • Other injury of unspecified body region, initial encounter    • Panic disorder    • Paresthesia of skin    •  "Polycystic ovaries    • PONV (postoperative nausea and vomiting)    • Prediabetes    • Sacrococcygeal disorders, not elsewhere classified    • Subacute cough    • Trochanteric bursitis of left hip    • Type 2 diabetes mellitus    • Viral infection    • Wears contact lenses    • Wears eyeglasses      Past Surgical History:   Procedure Laterality Date   • BODY LIFT N/A 2017    Procedure: CIRCUMFERENTIAL BODY LIFT;  Surgeon: Enrique Leroy MD;  Location:  [facility];  Service:    • BODY LIFT Bilateral 2019    Procedure: BILATERAL THIGH  LIFT WITH LIPOSUCTION;  Surgeon: Enrique Leroy MD;  Location:  [facility];  Service: Plastics   • BREAST AUGMENTATION     •  SECTION     • GASTRIC SLEEVE LAPAROSCOPIC  08/10/2020   • INTRAUTERINE DEVICE INSERTION  2019    Mirena; due for removal 2025   • LIPOMA EXCISION     • LIPOSUCTION ABDOMINAL N/A 2019    Procedure: LIPOSUCTION ABDOMINAL;  Surgeon: Enrique Leroy MD;  Location:  [facility];  Service: Plastics   • LUNG BIOPSY     • OTHER SURGICAL HISTORY      Back Lift   • TONSILLECTOMY       Family History   Problem Relation Age of Onset   • Heart disease Mother    • Heart disease Maternal Grandmother    • Thyroid disease Maternal Grandmother    • Cervical cancer Maternal Grandmother    • Hypertension Other    • Obesity Other    • Hypertension Other    • Breast cancer Other         2 SISTERS OF MATERNAL GRANDFATHER       Vital Signs  /74 (BP Location: Left arm, Patient Position: Sitting, Cuff Size: Adult)   Pulse 74   Temp 98.1 °F (36.7 °C)   Resp 18   Ht 167.6 cm (65.98\")   Wt 86.7 kg (191 lb 3.2 oz)   SpO2 98%   BMI 30.88 kg/m²   Estimated body mass index is 30.88 kg/m² as calculated from the following:    Height as of this encounter: 167.6 cm (65.98\").    Weight as of this encounter: 86.7 kg (191 lb 3.2 oz).    Physical Exam  Vitals reviewed.   Constitutional:       Appearance: Normal appearance.   HENT:      Head: Normocephalic " and atraumatic.   Eyes:      Conjunctiva/sclera: Conjunctivae normal.   Cardiovascular:      Rate and Rhythm: Normal rate and regular rhythm.      Heart sounds: Normal heart sounds.   Pulmonary:      Effort: Pulmonary effort is normal. No respiratory distress.   Abdominal:      General: There is no distension.      Tenderness: There is no abdominal tenderness. There is no right CVA tenderness or left CVA tenderness.   Skin:     General: Skin is dry.   Neurological:      Mental Status: She is alert and oriented to person, place, and time.                   POCT Results (if applicable):  Results for orders placed or performed in visit on 04/12/23   POC Urinalysis Dipstick    Specimen: Urine   Result Value Ref Range    Color Red (A) Yellow, Straw, Dark Yellow, Kailyn    Clarity, UA Cloudy (A) Clear    Glucose, UA Negative Negative mg/dL    Bilirubin Negative Negative    Ketones, UA Negative Negative    Specific Gravity  1.010 1.005 - 1.030    Blood, UA 3+ (A) Negative    pH, Urine 6.0 5.0 - 8.0    Protein, POC 1+ (A) Negative mg/dL    Urobilinogen, UA 0.2 E.U./dL Normal, 0.2 E.U./dL    Leukocytes Moderate (2+) (A) Negative    Nitrite, UA Negative Negative            Assessment and Plan     Diagnoses and all orders for this visit:    1. Dysuria (Primary)  -     POC Urinalysis Dipstick    2. Acute cystitis with hematuria  Assessment & Plan:  Awakened this morning with severe pain and discomfort of her lower back.. Additional urinary complaints include hematuria, dysuria, pyuria, frequency and urgency. Urinalysis showing +leukocyte and blood. Given her level of pain there is possibility of renal stone, no prior history. Will treat with cipro for UTI. Discussed fact there could be stone involvement given her significant pain as well. Advised if decreased UOP or anuria develops she is to report to ED for further evaluation due to urinary obstruction. If no improvement 48 hours after starting antibiotic will obtain imaging to  r/o renal stone.   Treat leukolcytes with cipro. Pyridium given for symptoms management as well as tramadol.     Orders:  -     ciprofloxacin (Cipro) 500 MG tablet; Take 1 tablet by mouth 2 (Two) Times a Day for 7 days.  Dispense: 14 tablet; Refill: 0  -     phenazopyridine (Pyridium) 200 MG tablet; Take 1 tablet by mouth 3 (Three) Times a Day As Needed for Bladder Spasms.  Dispense: 9 tablet; Refill: 0  -     traMADol (ULTRAM) 50 MG tablet; Take 1 tablet by mouth Every 6 (Six) Hours As Needed for Moderate Pain.  Dispense: 10 tablet; Refill: 0          Follow Up  No follow-ups on file.    Magali Hernández, APRN

## 2023-04-20 ENCOUNTER — TELEPHONE (OUTPATIENT)
Dept: FAMILY MEDICINE CLINIC | Facility: CLINIC | Age: 38
End: 2023-04-20
Payer: COMMERCIAL

## 2023-04-20 RX ORDER — FLUCONAZOLE 150 MG/1
TABLET ORAL
Qty: 2 TABLET | Refills: 0 | Status: SHIPPED | OUTPATIENT
Start: 2023-04-20

## 2023-05-01 ENCOUNTER — OFFICE VISIT (OUTPATIENT)
Dept: FAMILY MEDICINE CLINIC | Facility: CLINIC | Age: 38
End: 2023-05-01
Payer: COMMERCIAL

## 2023-05-01 ENCOUNTER — TELEPHONE (OUTPATIENT)
Dept: FAMILY MEDICINE CLINIC | Facility: CLINIC | Age: 38
End: 2023-05-01

## 2023-05-01 VITALS
SYSTOLIC BLOOD PRESSURE: 134 MMHG | HEART RATE: 75 BPM | WEIGHT: 191.4 LBS | HEIGHT: 66 IN | BODY MASS INDEX: 30.76 KG/M2 | DIASTOLIC BLOOD PRESSURE: 88 MMHG | OXYGEN SATURATION: 99 % | TEMPERATURE: 97.3 F

## 2023-05-01 DIAGNOSIS — R03.0 ELEVATED BLOOD PRESSURE, SITUATIONAL: ICD-10-CM

## 2023-05-01 DIAGNOSIS — G89.29 CHRONIC LEFT HIP PAIN: ICD-10-CM

## 2023-05-01 DIAGNOSIS — R55 VASOVAGAL RESPONSE: Primary | ICD-10-CM

## 2023-05-01 DIAGNOSIS — R07.89 ATYPICAL CHEST PAIN: ICD-10-CM

## 2023-05-01 DIAGNOSIS — M25.552 CHRONIC LEFT HIP PAIN: ICD-10-CM

## 2023-05-01 PROCEDURE — 93000 ELECTROCARDIOGRAM COMPLETE: CPT | Performed by: INTERNAL MEDICINE

## 2023-05-01 PROCEDURE — 99214 OFFICE O/P EST MOD 30 MIN: CPT | Performed by: INTERNAL MEDICINE

## 2023-05-01 RX ORDER — HYDROCODONE BITARTRATE AND ACETAMINOPHEN 5; 325 MG/1; MG/1
1 TABLET ORAL EVERY 6 HOURS PRN
Qty: 12 TABLET | Refills: 0 | Status: SHIPPED | OUTPATIENT
Start: 2023-05-01 | End: 2023-05-04

## 2023-05-01 RX ORDER — PROMETHAZINE HYDROCHLORIDE 25 MG/ML
INJECTION, SOLUTION INTRAMUSCULAR; INTRAVENOUS
COMMUNITY

## 2023-05-01 RX ORDER — ONDANSETRON 4 MG/1
TABLET, FILM COATED ORAL EVERY 8 HOURS
COMMUNITY

## 2023-05-01 NOTE — TELEPHONE ENCOUNTER
She has called stating that she had a cortisone shot in her hip Friday and has been having some nausea, fever, lots od dizziness, very bad hip pain and her BP has been running very high for her. This am her BP was 149/102.   I have advised that she be checked out and she has made an appt for this afternoon. TF

## 2023-05-01 NOTE — PROGRESS NOTES
"    Follow Up Office Visit      Date: 2023   Patient Name: Ely De La Garza  : 1985   MRN: [id number]     Chief Complaint:    Chief Complaint   Patient presents with   • body red     BP high, pain at injection site       History of Present Illness: Ely De La Garza is a 37 y.o. female who is here today for follow-up of having had a left intra-articular hip injection under the services of Dr. Bridger Mancera 3 days ago, given per patient account \"bone-on-bone\" arthritis in the left hip, this done under fluoroscopy, short time afterwards patient having developed \"dots in her vision, feeling weak, clammy and sweaty, flushed, over the last day having some atypical chest discomfort and having had elevated blood pressure earlier today 149/102.  Feels like her left hip pain is significantly worse than it was before she got the injection.  She relates there has been talk of her getting a hip replacement at some point, previously having had resection of some spurs and partial labrum resection of her hip joint in 2023..  Patient has had a history of sleeve gastrectomy which makes regular use of NSAIDs contraindicated, and also had received a low-dose of tramadol 50 mg tablets earlier this month for an unrelated problem that did not seem to give her any relief of pain.    Subjective      Review of Systems:   Review of Systems    I have reviewed the patients family history, social history, past medical history, past surgical history and have updated it as appropriate.     Medications:     Current Outpatient Medications:   •  BIOTIN PO, Take  by mouth., Disp: , Rfl:   •  fluconazole (Diflucan) 150 MG tablet, Take one tablet and repeat on day 3, Disp: 2 tablet, Rfl: 0  •  linaclotide (Linzess) 145 MCG capsule capsule, Take 1 capsule by mouth Every Morning Before Breakfast., Disp: 90 capsule, Rfl: 3  •  Omeprazole Magnesium (PRILOSEC PO), omeprazole, Disp: , Rfl:   •  ondansetron (ZOFRAN) 4 MG tablet, Every 8 " "(Eight) Hours., Disp: , Rfl:   •  PARoxetine (PAXIL) 10 MG tablet, Take 1 tablet by mouth Daily., Disp: , Rfl:   •  promethazine (PHENERGAN) 25 MG/ML injection, Phenergan, Disp: , Rfl:   •  SUMAtriptan (Imitrex) 100 MG tablet, Take 1/2- one tablet at onset of headache. May repeat dose one time in 2 hours if headache not relieved., Disp: 9 tablet, Rfl: 2  •  topiramate (TOPAMAX) 100 MG tablet, Take 1 tablet by mouth twice daily, Disp: 30 tablet, Rfl: 0  •  traZODone (DESYREL) 50 MG tablet, Take 2 tablets by mouth every night at bedtime., Disp: , Rfl:   •  HYDROcodone-acetaminophen (Norco) 5-325 MG per tablet, Take 1 tablet by mouth Every 6 (Six) Hours As Needed for Moderate Pain or Severe Pain for up to 3 days., Disp: 12 tablet, Rfl: 0    Allergies:   No Known Allergies    Objective     Physical Exam: Please see above  Vital Signs:   Vitals:    05/01/23 1511   BP: 134/88   BP Location: Left arm   Patient Position: Sitting   Cuff Size: Adult   Pulse: 75   Temp: 97.3 °F (36.3 °C)   TempSrc: Temporal   SpO2: 99%   Weight: 86.8 kg (191 lb 6.4 oz)   Height: 167.6 cm (65.98\")     Body mass index is 30.91 kg/m².      Physical Exam  General: Overall well-appearing 37-year-old white female in no acute distress.  Face without flushing, neck supple with no masses or tenderness  Lungs clear with no wheeze tachypnea or cough  Cardiac regular rate rhythm with no murmurs gallops or rubs, no palpable chest wall tenderness  Spine with no current palpable lumbar discomfort to palpation, left greater than right hip discomfort to full flexion, with left greater than right hip discomfort to internal greater than external rotation, 2 separate left anterior hip joint injection sites with no swelling redness or heat noted.  Skin without rash    ECG 12 Lead    Date/Time: 5/1/2023 3:54 PM  Performed by: Harley Rodriguez MD  Authorized by: Harley Rodriguez MD   Previous ECG: no previous ECG available  Comments: Normal sinus rhythm rate 60 with " no acute or chronic abnormalities noted, no prior tracing for comparison            Results:   Labs:   No results found for: HGBA1C, CMP, CBCDIFFPANEL, CREAT, TSH     POCT Results (if applicable):   Results for orders placed or performed in visit on 04/12/23   POC Urinalysis Dipstick    Specimen: Urine   Result Value Ref Range    Color Red (A) Yellow, Straw, Dark Yellow, Kailyn    Clarity, UA Cloudy (A) Clear    Glucose, UA Negative Negative mg/dL    Bilirubin Negative Negative    Ketones, UA Negative Negative    Specific Gravity  1.010 1.005 - 1.030    Blood, UA 3+ (A) Negative    pH, Urine 6.0 5.0 - 8.0    Protein, POC 1+ (A) Negative mg/dL    Urobilinogen, UA 0.2 E.U./dL Normal, 0.2 E.U./dL    Leukocytes Moderate (2+) (A) Negative    Nitrite, UA Negative Negative       Imaging:   No valid procedures specified.       Assessment / Plan      Assessment/Plan:   Diagnoses and all orders for this visit:    1. Vasovagal response (Primary)  Patient describes what sounds like having had a vasovagal type response after she had left intra-articular hip injection 3 days ago having some lightheadedness, flushing, spots in her vision.  The symptoms have resolved  2. Atypical chest pain  -     ECG 12 Lead  Patient describes some atypical anterior chest discomfort earlier today with no other associated symptoms that has resolved.  Her EKG today is unremarkable.  She would be low risk to have any cardiac etiology of chest pain at this time.  Given current asymptomatic status and normal EKG, will simply observe for now.  3. Elevated blood pressure, situational  -     ECG 12 Lead  Patient describes having had an elevated blood pressure earlier today, normal tensive currently.  Again suspect secondary to previous problems.  Observation recommended for now  4. Chronic left hip pain  -     HYDROcodone-acetaminophen (Norco) 5-325 MG per tablet; Take 1 tablet by mouth Every 6 (Six) Hours As Needed for Moderate Pain or Severe Pain for up  to 3 days.  Dispense: 12 tablet; Refill: 0  Patient describes chronic left hip pain that unfortunately has not responded to a left intra-articular hip injection from 3 days ago under fluoroscopy, and in fact she describes significant worsening pain.  She really is limited in the amount of NSAIDs she can take having had history of sleeve gastrectomy and previously did not have a clinical response to tramadol prescribed by our office nurse practitioner earlier this month for an unrelated problem.  We will prescribe low-dose Norco 5 mg every 6 hours.  Number 12 tablets to use cautiously and sparingly, pending follow-up with Dr. Bridgre dawkins of orthopedic surgery on 5/19/2023.  I did advise she could ingest sparing use of ibuprofen 2 or 3 tablets with food only for more severe type pain.    Follow Up:   Return if symptoms worsen or fail to improve.      At [facility], we believe that sharing information builds trust and better relationships. You are receiving this note because you recently visited [facility]. It is possible you will see health information before a provider has talked with you about it. This kind of information can be easy to misunderstand. To help you fully understand what it means for your health, we urge you to discuss this note with your provider.    Harley Rodriguez MD  [facility]

## 2023-06-02 ENCOUNTER — OFFICE VISIT (OUTPATIENT)
Dept: FAMILY MEDICINE CLINIC | Facility: CLINIC | Age: 38
End: 2023-06-02

## 2023-06-02 VITALS
HEART RATE: 93 BPM | TEMPERATURE: 98.6 F | OXYGEN SATURATION: 99 % | WEIGHT: 183 LBS | SYSTOLIC BLOOD PRESSURE: 124 MMHG | BODY MASS INDEX: 29.41 KG/M2 | DIASTOLIC BLOOD PRESSURE: 76 MMHG | HEIGHT: 66 IN

## 2023-06-02 DIAGNOSIS — E66.3 OVERWEIGHT (BMI 25.0-29.9): ICD-10-CM

## 2023-06-02 DIAGNOSIS — Z98.82 HISTORY OF BILATERAL BREAST IMPLANTS: ICD-10-CM

## 2023-06-02 DIAGNOSIS — T14.8XXA BRUISING: Primary | ICD-10-CM

## 2023-06-02 DIAGNOSIS — R92.0 MICROCALCIFICATION OF RIGHT BREAST ON MAMMOGRAM: ICD-10-CM

## 2023-06-02 DIAGNOSIS — Z90.3 HISTORY OF SLEEVE GASTRECTOMY: ICD-10-CM

## 2023-06-02 DIAGNOSIS — Z12.31 ENCOUNTER FOR SCREENING MAMMOGRAM FOR MALIGNANT NEOPLASM OF BREAST: ICD-10-CM

## 2023-06-02 RX ORDER — TRAZODONE HYDROCHLORIDE 100 MG/1
100 TABLET ORAL
COMMUNITY
Start: 2023-05-09

## 2023-06-02 RX ORDER — PAROXETINE 30 MG/1
1 TABLET, FILM COATED ORAL DAILY
COMMUNITY
Start: 2023-05-09

## 2023-06-02 NOTE — PROGRESS NOTES
Follow Up Office Visit      Date: 2023   Patient Name: Ely De La Garza  : 1985   MRN: [id number]     Chief Complaint:    Chief Complaint   Patient presents with   • indention on breast     Would like mamm order   • bruising easy       History of Present Illness: Ely De La Garza is a 37 y.o. female who is here today for several concerns, 1 of which is noting for last several months propensity to bruise easily whenever she gets a minor bump by her account, having no bleeding diatheses otherwise however such as nosebleeds mucosal hemorrhaging, hematuria, hematochezia, and historically having heavy menses but in fact this improved since she had Mirena removed.  There is no family history of bleeding diatheses.  She does have a history of sleeve gastrectomy for significant obesity, continue to gradually lose weight, now down another 8 pounds since she was seen in the last month, though not by intent, not having taken related multivitamins or had any related lab testing for at least 2 years now.  She also notes that she has had bilateral breast implants, and subsequently had a secondary routine mammogram screen from 2022 that revealed some abnormalities on the right breast with subsequent diagnostic study revealing microcalcifications felt to be benign, with recommended follow-up routine screening in 1 year, now past due for just over a month.  She does note in this context that she has had a slight crease indentation on the left lateral breast that is asymptomatic with no overlying skin change otherwise, and no nipple discharge nor axillary discomfort.  Otherwise feels well.    Subjective      Review of Systems:   Review of Systems    I have reviewed the patients family history, social history, past medical history, past surgical history and have updated it as appropriate.     Medications:     Current Outpatient Medications:   •  BIOTIN PO, Take  by mouth., Disp: , Rfl:   •  linaclotide  "(Linzess) 145 MCG capsule capsule, Take 1 capsule by mouth Every Morning Before Breakfast., Disp: 90 capsule, Rfl: 3  •  Omeprazole Magnesium (PRILOSEC PO), omeprazole, Disp: , Rfl:   •  ondansetron (ZOFRAN) 4 MG tablet, Every 8 (Eight) Hours., Disp: , Rfl:   •  SUMAtriptan (Imitrex) 100 MG tablet, Take 1/2- one tablet at onset of headache. May repeat dose one time in 2 hours if headache not relieved., Disp: 9 tablet, Rfl: 2  •  topiramate (TOPAMAX) 100 MG tablet, Take 1 tablet by mouth twice daily, Disp: 30 tablet, Rfl: 0  •  PARoxetine (PAXIL) 30 MG tablet, Take 1 tablet by mouth Daily., Disp: , Rfl:   •  traZODone (DESYREL) 100 MG tablet, Take 1 tablet by mouth every night at bedtime., Disp: , Rfl:     Allergies:   No Known Allergies    Objective     Physical Exam: Please see above  Vital Signs:   Vitals:    06/02/23 1345   BP: 124/76   BP Location: Left arm   Patient Position: Sitting   Cuff Size: Adult   Pulse: 93   Temp: 98.6 °F (37 °C)   TempSrc: Temporal   SpO2: 99%   Weight: 83 kg (183 lb)   Height: 167.6 cm (65.98\")     Body mass index is 29.55 kg/m².  BMI is >= 25 and <30. (Overweight) The following options were offered after discussion;: exercise counseling/recommendations and nutrition counseling/recommendations       Physical Exam  General: Healthy 37-year-old female who is in no acute distress, BMI of 29.5 with weight down 8 pounds in the last month.  Head neck: No conjunctivae bleeding, oral mucous membranes without any ecchymoses or petechiae, neck supple with no lymphadenopathy thyromegaly masses or tenderness  Lungs clear with no wheeze tachypnea or cough  Cardiac regular rate rhythm with no murmurs gallops or rubs  Cutaneous exam with a few small bruises on her extremities, unremarkable in appearance.  No petechiae.  With chaperone present, bilateral breast exam performed with palpable bilateral breast implants, no adenopathy or masses with axillary exam bilaterally normal and no nipple discharge " bilaterally.  When she sits up she has a vague linear indentation in her left lateral quadrant of her breast just outside the areola that is nontender with no palpable masses, possibly related to a vein.  She has some visible veins otherwise in both breasts  Procedures    Results:   Labs:   No results found for: HGBA1C, CMP, CBCDIFFPANEL, CREAT, TSH     POCT Results (if applicable):   Results for orders placed or performed in visit on 04/12/23   POC Urinalysis Dipstick    Specimen: Urine   Result Value Ref Range    Color Red (A) Yellow, Straw, Dark Yellow, Kailyn    Clarity, UA Cloudy (A) Clear    Glucose, UA Negative Negative mg/dL    Bilirubin Negative Negative    Ketones, UA Negative Negative    Specific Gravity  1.010 1.005 - 1.030    Blood, UA 3+ (A) Negative    pH, Urine 6.0 5.0 - 8.0    Protein, POC 1+ (A) Negative mg/dL    Urobilinogen, UA 0.2 E.U./dL Normal, 0.2 E.U./dL    Leukocytes Moderate (2+) (A) Negative    Nitrite, UA Negative Negative       Imaging:   No valid procedures specified.     Assessment / Plan      Assessment/Plan:   Diagnoses and all orders for this visit:    1. Bruising (Primary)  -     CBC & Differential; Future  -     Protime-INR; Future  -     aPTT; Future  -     Fibrinogen; Future  Patient concerned about easy bruising whenever she gets some sort of a mild trauma against her skin, but does not have any other bleeding diatheses such as spontaneous hemorrhaging from her nose, mouth, urine or bowels.  Her heavy menses have actually improved since she had Mirena removed.  No family history of bleeding diatheses.  Her exam and history are overall very benign appearing.  We will check basic bleeding disorder work-up including a CBC INR PTT and fibrinogen level calling patient with results  2. History of bilateral breast implants  -     Mammo Screening Bilateral With CAD; Future  Patient does have vague linear indentation on the left lateral breast which is very nonspecific, possibly related  to an underlying vein, with no palpable abnormalities noted.  No cutaneous changes otherwise noted.  Update routine bilateral mammogram screen on the last study in 4/2022  3. Microcalcification of right breast on mammogram  -     Mammo Screening Bilateral With CAD; Future  Patient had noticed microcalcifications on the right breast during her study in 4/2022, with subsequent diagnostic right-sided mammogram revealing benign microcalcifications with a 1 year follow-up study recommended.  Schedule mammogram as noted  4. Encounter for screening mammogram for malignant neoplasm of breast  -     Mammo Screening Bilateral With CAD; Future  See above.  Obtain bilateral screening mammogram  5. Overweight (BMI 25.0-29.9)  -     TSH Rfx On Abnormal To Free T4; Future  -     Hemoglobin A1c; Future  -     Lipid Panel; Future  -     Comprehensive Metabolic Panel; Future  Patient with history of sleeve gastrectomy several years prior, having made excellent progress regarding weight loss including another 8 pounds in the last month.  Continue healthy lifestyle with diet and exercise  6. History of sleeve gastrectomy  -     Vitamin D,25-Hydroxy; Future  -     Vitamin B12; Future  -     Folate; Future  -     Ferritin; Future  -     Iron Profile; Future  History of sleeve gastrectomy several years prior with very nice significant weight loss.  Has not had any pertinent labs for at least 2 years now, and we will check screen for various iron and vitamin deficiencies as noted.      Follow Up:   Return in about 3 months (around 9/2/2023) for Annual physical.      At [facility], we believe that sharing information builds trust and better relationships. You are receiving this note because you recently visited [facility]. It is possible you will see health information before a provider has talked with you about it. This kind of information can be easy to misunderstand. To help you fully understand what it means for your health, we  urge you to discuss this note with your provider.    Harley Rodriguez MD  [facility]   Answers for HPI/ROS submitted by the patient on 6/1/2023  Please describe your symptoms.: Routine blood work, Indented line on side of left breast; would like to schedule a mammogram  Have you had these symptoms before?: No  How long have you been having these symptoms?: Greater than 2 weeks  What is the primary reason for your visit?: Other

## 2023-06-02 NOTE — PROGRESS NOTES
Venipuncture Blood Specimen Collection  Venipuncture performed in right arm by Hannah Joe MA with good hemostasis. Patient tolerated the procedure well without complications.   06/02/23   Hannah Joe MA

## 2023-06-03 LAB
25(OH)D3+25(OH)D2 SERPL-MCNC: 29.9 NG/ML (ref 30–100)
ALBUMIN SERPL-MCNC: 4.3 G/DL (ref 3.8–4.8)
ALBUMIN/GLOB SERPL: 2 {RATIO} (ref 1.2–2.2)
ALP SERPL-CCNC: 50 IU/L (ref 44–121)
ALT SERPL-CCNC: 9 IU/L (ref 0–32)
APTT PPP: 28 SEC (ref 24–33)
AST SERPL-CCNC: 11 IU/L (ref 0–40)
BASOPHILS # BLD AUTO: 0 X10E3/UL (ref 0–0.2)
BASOPHILS NFR BLD AUTO: 1 %
BILIRUB SERPL-MCNC: 0.8 MG/DL (ref 0–1.2)
BUN SERPL-MCNC: 7 MG/DL (ref 6–20)
BUN/CREAT SERPL: 11 (ref 9–23)
CALCIUM SERPL-MCNC: 8.9 MG/DL (ref 8.7–10.2)
CHLORIDE SERPL-SCNC: 104 MMOL/L (ref 96–106)
CHOLEST SERPL-MCNC: 177 MG/DL (ref 100–199)
CO2 SERPL-SCNC: 22 MMOL/L (ref 20–29)
CREAT SERPL-MCNC: 0.66 MG/DL (ref 0.57–1)
EGFRCR SERPLBLD CKD-EPI 2021: 115 ML/MIN/1.73
EOSINOPHIL # BLD AUTO: 0 X10E3/UL (ref 0–0.4)
EOSINOPHIL NFR BLD AUTO: 1 %
ERYTHROCYTE [DISTWIDTH] IN BLOOD BY AUTOMATED COUNT: 12.8 % (ref 11.7–15.4)
FERRITIN SERPL-MCNC: 58 NG/ML (ref 15–150)
FIBRINOGEN PPP-MCNC: 332 MG/DL (ref 193–507)
FOLATE SERPL-MCNC: 5.7 NG/ML
GLOBULIN SER CALC-MCNC: 2.2 G/DL (ref 1.5–4.5)
GLUCOSE SERPL-MCNC: 95 MG/DL (ref 70–99)
HBA1C MFR BLD: 5.4 % (ref 4.8–5.6)
HCT VFR BLD AUTO: 43.7 % (ref 34–46.6)
HDLC SERPL-MCNC: 52 MG/DL
HGB BLD-MCNC: 14.8 G/DL (ref 11.1–15.9)
IMM GRANULOCYTES # BLD AUTO: 0 X10E3/UL (ref 0–0.1)
IMM GRANULOCYTES NFR BLD AUTO: 0 %
INR PPP: 1.1 (ref 0.9–1.2)
IRON SATN MFR SERPL: 27 % (ref 15–55)
IRON SERPL-MCNC: 99 UG/DL (ref 27–159)
LDLC SERPL CALC-MCNC: 105 MG/DL (ref 0–99)
LYMPHOCYTES # BLD AUTO: 1.3 X10E3/UL (ref 0.7–3.1)
LYMPHOCYTES NFR BLD AUTO: 25 %
MCH RBC QN AUTO: 31.9 PG (ref 26.6–33)
MCHC RBC AUTO-ENTMCNC: 33.9 G/DL (ref 31.5–35.7)
MCV RBC AUTO: 94 FL (ref 79–97)
MONOCYTES # BLD AUTO: 0.3 X10E3/UL (ref 0.1–0.9)
MONOCYTES NFR BLD AUTO: 6 %
NEUTROPHILS # BLD AUTO: 3.5 X10E3/UL (ref 1.4–7)
NEUTROPHILS NFR BLD AUTO: 67 %
PLATELET # BLD AUTO: 193 X10E3/UL (ref 150–450)
POTASSIUM SERPL-SCNC: 3.8 MMOL/L (ref 3.5–5.2)
PROT SERPL-MCNC: 6.5 G/DL (ref 6–8.5)
PROTHROMBIN TIME: 10.9 SEC (ref 9.1–12)
RBC # BLD AUTO: 4.64 X10E6/UL (ref 3.77–5.28)
SODIUM SERPL-SCNC: 141 MMOL/L (ref 134–144)
TIBC SERPL-MCNC: 373 UG/DL (ref 250–450)
TRIGL SERPL-MCNC: 111 MG/DL (ref 0–149)
TSH SERPL DL<=0.005 MIU/L-ACNC: 1.77 UIU/ML (ref 0.45–4.5)
UIBC SERPL-MCNC: 274 UG/DL (ref 131–425)
VIT B12 SERPL-MCNC: 361 PG/ML (ref 232–1245)
VLDLC SERPL CALC-MCNC: 20 MG/DL (ref 5–40)
WBC # BLD AUTO: 5.2 X10E3/UL (ref 3.4–10.8)

## 2023-06-05 ENCOUNTER — TELEPHONE (OUTPATIENT)
Dept: FAMILY MEDICINE CLINIC | Facility: CLINIC | Age: 38
End: 2023-06-05
Payer: COMMERCIAL

## 2023-06-05 NOTE — TELEPHONE ENCOUNTER
Phone conversation with patient in follow-up of her office visit from 6/2/2023 with results of lab testing on same day:    TSH, CMP, CBC, iron studies, B12, folic acid, INR, PTT, ferritin, lipid profile, hemoglobin A1c, all satisfactory  Vitamin D 29.9, essentially satisfactory with normal 30 or greater.    Assessment/plan:  1.  Overall very satisfactory test results.  Advised to take 1 multivitamin daily.  Continue healthy lifestyle.

## 2023-06-08 ENCOUNTER — OFFICE VISIT (OUTPATIENT)
Dept: FAMILY MEDICINE CLINIC | Facility: CLINIC | Age: 38
End: 2023-06-08
Payer: COMMERCIAL

## 2023-06-08 VITALS
HEART RATE: 69 BPM | WEIGHT: 182.8 LBS | SYSTOLIC BLOOD PRESSURE: 128 MMHG | TEMPERATURE: 98 F | DIASTOLIC BLOOD PRESSURE: 88 MMHG | BODY MASS INDEX: 29.38 KG/M2 | RESPIRATION RATE: 18 BRPM | OXYGEN SATURATION: 96 % | HEIGHT: 66 IN

## 2023-06-08 DIAGNOSIS — E66.3 OVERWEIGHT (BMI 25.0-29.9): ICD-10-CM

## 2023-06-08 DIAGNOSIS — E55.9 VITAMIN D DEFICIENCY: ICD-10-CM

## 2023-06-08 DIAGNOSIS — F33.42 RECURRENT MAJOR DEPRESSIVE DISORDER, IN FULL REMISSION: ICD-10-CM

## 2023-06-08 DIAGNOSIS — K21.9 GASTROESOPHAGEAL REFLUX DISEASE, UNSPECIFIED WHETHER ESOPHAGITIS PRESENT: ICD-10-CM

## 2023-06-08 DIAGNOSIS — Z00.01 ENCOUNTER FOR GENERAL ADULT MEDICAL EXAMINATION WITH ABNORMAL FINDINGS: Primary | ICD-10-CM

## 2023-06-08 DIAGNOSIS — F41.9 ANXIETY: ICD-10-CM

## 2023-06-08 DIAGNOSIS — K59.00 CONSTIPATION, UNSPECIFIED CONSTIPATION TYPE: ICD-10-CM

## 2023-06-08 DIAGNOSIS — G47.00 INSOMNIA, UNSPECIFIED TYPE: ICD-10-CM

## 2023-06-08 DIAGNOSIS — G43.109 MIGRAINE WITH AURA AND WITHOUT STATUS MIGRAINOSUS, NOT INTRACTABLE: ICD-10-CM

## 2023-06-08 DIAGNOSIS — M16.32 OSTEOARTHRITIS RESULTING FROM LEFT HIP DYSPLASIA: ICD-10-CM

## 2023-06-08 DIAGNOSIS — Z01.818 PREOP EXAMINATION: ICD-10-CM

## 2023-06-08 RX ORDER — PANTOPRAZOLE SODIUM 40 MG/1
40 TABLET, DELAYED RELEASE ORAL DAILY
Qty: 90 TABLET | Refills: 3 | Status: SHIPPED | OUTPATIENT
Start: 2023-06-08

## 2023-06-08 RX ORDER — MELATONIN
1000 DAILY
COMMUNITY

## 2023-06-08 NOTE — PROGRESS NOTES
"     Female Physical Note      Date: 2023   Patient Name: Ely De La Garza  : 1985   MRN: [id number]     Chief Complaint:    Chief Complaint   Patient presents with    surgery clearance    Annual Exam       History of Present Illness: Ely De La Garza is a 37 y.o. female who is here today for their annual health maintenance and physical.  She also is here for preoperative clearance to undergo left COSME to be scheduled Dr. Bridger Mancera N/A near future date when she gets surgical clearance, having history of bilateral hip dysplasia with advanced degenerative changes on her left hip, per patient report \"bone-on-bone\", having significant lifestyle limiting pain.  She has had multiple previous surgeries with no anesthetic reactions.  She feels well and every other capacity.  History of morbid obesity having lost almost 200 pounds in weight in the last 3 years since undergoing a sleeve gastrectomy, history of anxiety depression doing well with combination of Paxil 30 mg daily and trazodone 100 mg nightly the latter for associated insomnia, migraine headaches taking Topamax 100 mg twice daily with Imitrex as needed, not having any headaches since undergoing bilateral daith ear piercings.  She has had GERD with breakthrough symptoms despite taking omeprazole 40 mg daily, generally following her diet well, previous constipation doing well on Linzess.    Review of systems: Head and neck negative, no cardiopulmonary complaints, no GI or  complaints currently, no musculoskeletal complaints other than her chronic progressive now lifestyle limiting left hip pain, moods doing well      Subjective      Review of Systems:   Review of Systems    Past Medical History, Social History, Family History and Care Team were all reviewed with patient and updated as appropriate.     Medications:     Current Outpatient Medications:     BIOTIN PO, Take  by mouth., Disp: , Rfl:     linaclotide (Linzess) 145 MCG capsule " capsule, Take 1 capsule by mouth Every Morning Before Breakfast., Disp: 90 capsule, Rfl: 3    ondansetron (ZOFRAN) 4 MG tablet, Every 8 (Eight) Hours., Disp: , Rfl:     PARoxetine (PAXIL) 30 MG tablet, Take 1 tablet by mouth Daily., Disp: , Rfl:     SUMAtriptan (Imitrex) 100 MG tablet, Take 1/2- one tablet at onset of headache. May repeat dose one time in 2 hours if headache not relieved., Disp: 9 tablet, Rfl: 2    topiramate (TOPAMAX) 100 MG tablet, Take 1 tablet by mouth twice daily, Disp: 30 tablet, Rfl: 0    traZODone (DESYREL) 100 MG tablet, Take 1 tablet by mouth every night at bedtime., Disp: , Rfl:     Cholecalciferol 25 MCG (1000 UT) tablet, Take 1 tablet by mouth Daily., Disp: , Rfl:     pantoprazole (PROTONIX) 40 MG EC tablet, Take 1 tablet by mouth Daily., Disp: 90 tablet, Rfl: 3    Allergies:   No Known Allergies    Immunizations:  Health Maintenance Summary            Overdue - ANNUAL PHYSICAL (Yearly) Overdue - never done      04/12/2023  Postponed until 5/8/2023 by Linette Toro MA (Pending event)              Overdue - COVID-19 Vaccine (3 - Pfizer series) Overdue since 8/26/2021 04/12/2023  Postponed until 5/22/2023 by Linette Toro MA (Pending event)    07/01/2021  Imm Admin: COVID-19 (PFIZER) Purple Cap Monovalent    06/10/2021  Imm Admin: COVID-19 (PFIZER) Purple Cap Monovalent              Postponed - HEPATITIS C SCREENING (Once) Postponed until 11/1/2023 05/01/2023  Postponed until 11/1/2023 by Kristine Yang (Pending event)    04/12/2023  Postponed until 4/12/2023 by Linette Toro MA (Pending event)              INFLUENZA VACCINE (Yearly - August to March) Next due on 8/1/2023      10/27/2022  Imm Admin: Fluzone Quad >6mos (Multi-dose)    10/27/2022  Imm Admin: Influenza, Unspecified    10/01/2020  Imm Admin: Influenza TIV (IM)    10/23/2018  Imm Admin: FluLaval/Fluzone >6mos    10/23/2018  Imm Admin: Flu Vaccine Quad PF >36MO    Only the first 5 history entries have  been loaded, but more history exists.              LIPID PANEL (Yearly) Next due on 6/2/2024 06/02/2023  Lipid Panel    05/01/2023  Postponed until 11/1/2023 by Kristine Yang (Pending event)    04/12/2023  Postponed until 4/12/2023 by Linette Toro MA (Pending event)    10/25/2021  Done    10/25/2021  Done              PAP SMEAR (Every 3 Years) Next due on 4/29/2025 04/29/2022  Pap IG, Rfx HPV ASCU    04/22/2021  Pap IG, HPV-hr    11/20/2019  Done - Negative              TDAP/TD VACCINES (2 - Td or Tdap) Next due on 6/23/2032 06/23/2022  Imm Admin: Tdap              Pneumococcal Vaccine 0-64 (Series Information) Aged Out      No completion, postpone, or frequency change history exists for this topic.                     No orders of the defined types were placed in this encounter.       Colorectal Screening:   N/A  Last Completed Colonoscopy       This patient has no relevant Health Maintenance data.          Pap:     Last Completed Pap Smear            PAP SMEAR (Every 3 Years) Next due on 4/29/2025 04/29/2022  Pap IG, Rfx HPV ASCU    04/22/2021  Pap IG, HPV-hr    11/20/2019  Done - Negative                   Mammogram: 4/22/2022 screening, diagnostic right mammogram 4/26/2022, 1 year follow-up recommended  Last Completed Mammogram       This patient has no relevant Health Maintenance data.             CT for Smoker (Age 50-80, 20 pk yr):   N/A  Bone Density/DEXA (Age 65 or high risk): Not indicated  Hep C (Age 18-79 once): Defer  HIV (Age 15-65 once): No results found for: HIV1X2 defer  A1c:   Hemoglobin A1C   Date Value Ref Range Status   06/02/2023 5.4 4.8 - 5.6 % Final     Comment:              Prediabetes: 5.7 - 6.4           Diabetes: >6.4           Glycemic control for adults with diabetes: <7.0        Lipid panel:  No results found for: LIPIDEXCLUSI    The ASCVD Risk score (Shawn TRUJILLO, et al., 2019) failed to calculate for the following reasons:    The 2019 ASCVD risk score is  "only valid for ages 40 to 79    Dermatology: N/A  Ophthalmologist: N/A  Dentist: Current approximately 6/2023    Tobacco Use: Medium Risk    Smoking Tobacco Use: Former    Smokeless Tobacco Use: Never    Passive Exposure: Not on file       Social History     Substance and Sexual Activity   Alcohol Use No        Social History     Substance and Sexual Activity   Drug Use Never        Diet/Physical activity: Healthy diet with regular physical activity being pursued    Sexual Health: Not currently using contraception, status post removal of Mirena, not attempting pregnancy   Menopause: N/A  Menstrual Cycles: Regular, last menstrual cycle: Within the last month    Depression: PHQ-2 Depression Screening  PHQ-9 Total Score: 0     Objective     Physical Exam:  Vital Signs:   Vitals:    06/08/23 1144   BP: 128/88   BP Location: Right arm   Patient Position: Sitting   Cuff Size: Adult   Pulse: 69   Resp: 18   Temp: 98 °F (36.7 °C)   TempSrc: Temporal   SpO2: 96%   Weight: 82.9 kg (182 lb 12.8 oz)   Height: 167.6 cm (65.98\")     Body mass index is 29.52 kg/m².     Physical Exam  Vitals and nursing note reviewed.   Constitutional:       Appearance: Normal appearance.      Comments: Pleasant, healthy, slightly overweight with BMI of 29.5 but by history almost 200 pound weight loss in the last 3 years status post sleeve gastrectomy, alert and oriented   HENT:      Head: Normocephalic and atraumatic.      Right Ear: Tympanic membrane, ear canal and external ear normal.      Left Ear: Tympanic membrane, ear canal and external ear normal.      Nose: Nose normal.      Mouth/Throat:      Mouth: Mucous membranes are moist.      Pharynx: Oropharynx is clear.      Comments: Good dentition  Eyes:      Extraocular Movements: Extraocular movements intact.      Conjunctiva/sclera: Conjunctivae normal.      Pupils: Pupils are equal, round, and reactive to light.   Neck:      Vascular: No carotid bruit.      Comments: No cervical masses or " tenderness, no periclavicular or axillary or inguinal adenopathy  Cardiovascular:      Rate and Rhythm: Normal rate and regular rhythm.      Pulses: Normal pulses.      Heart sounds: Normal heart sounds. No murmur heard.    No friction rub. No gallop.      Comments: 2+ carotids without bruits, 2+ radial pulses, 2+ femoral pulses without bruits, 2+ bipedal pulses with good perfusion and no edema  Pulmonary:      Effort: Pulmonary effort is normal.      Breath sounds: Normal breath sounds.      Comments: No cough wheeze or dyspnea  Chest:   Breasts:     Adriel Score is 5.      Breasts are symmetrical.      Right: Normal. No swelling, mass, nipple discharge, skin change or tenderness.      Left: Normal. No swelling, mass, nipple discharge, skin change or tenderness.   Abdominal:      General: Abdomen is flat. Bowel sounds are normal.      Palpations: Abdomen is soft.      Comments: Nontender nondistended with no organomegaly or masses   Genitourinary:     Comments: Breast pelvic and rectal exams deferred as routinely performed by gynecology with no acute complaints  Musculoskeletal:         General: Tenderness present. No swelling or deformity. Normal range of motion.      Cervical back: Normal range of motion and neck supple. No rigidity or tenderness.      Right lower leg: No edema.      Left lower leg: No edema.      Comments: Left anterior hip pain primarily with internal rotation   Lymphadenopathy:      Cervical: No cervical adenopathy.      Upper Body:      Right upper body: No supraclavicular or axillary adenopathy.      Left upper body: No supraclavicular or axillary adenopathy.   Skin:     General: Skin is warm and dry.      Capillary Refill: Capillary refill takes less than 2 seconds.      Findings: No lesion or rash.      Comments: Multiple tattoos   Neurological:      General: No focal deficit present.      Mental Status: She is alert and oriented to person, place, and time. Mental status is at baseline.       Cranial Nerves: No cranial nerve deficit.      Sensory: No sensory deficit.      Motor: No weakness.      Coordination: Coordination normal.   Psychiatric:         Mood and Affect: Mood normal.         Behavior: Behavior normal.         Thought Content: Thought content normal.         Judgment: Judgment normal.       POCT Results (if applicable);   Results for orders placed or performed in visit on 06/02/23   Fibrinogen    Specimen: Arm, Right; Blood    Blood  Release to adelina   Result Value Ref Range    Fibrinogen 332 193 - 507 mg/dL   aPTT    Specimen: Arm, Right; Blood    Blood  Release to adelina   Result Value Ref Range    aPTT 28 24 - 33 sec   Protime-INR    Specimen: Arm, Right; Blood    Blood  Release to adelina   Result Value Ref Range    INR 1.1 0.9 - 1.2    Protime 10.9 9.1 - 12.0 sec   Iron Profile    Specimen: Arm, Right; Blood    Blood  Release to adelina   Result Value Ref Range    TIBC 373 250 - 450 ug/dL    UIBC 274 131 - 425 ug/dL    Iron 99 27 - 159 ug/dL    Iron Saturation 27 15 - 55 %   Ferritin    Specimen: Arm, Right; Blood    Blood  Release to adelina   Result Value Ref Range    Ferritin 58 15 - 150 ng/mL   Folate    Specimen: Arm, Right; Blood    Blood  Release to adelina   Result Value Ref Range    Folate 5.7 >3.0 ng/mL   Vitamin B12    Specimen: Arm, Right; Blood    Blood  Release to adelina   Result Value Ref Range    Vitamin B-12 361 232 - 1,245 pg/mL   Vitamin D,25-Hydroxy    Specimen: Arm, Right; Blood    Blood  Release to adelina   Result Value Ref Range    25 Hydroxy, Vitamin D 29.9 (L) 30.0 - 100.0 ng/mL   Comprehensive Metabolic Panel    Specimen: Arm, Right; Blood    Blood  Release to adelina   Result Value Ref Range    Glucose 95 70 - 99 mg/dL    BUN 7 6 - 20 mg/dL    Creatinine 0.66 0.57 - 1.00 mg/dL    EGFR Result 115 >59 mL/min/1.73    BUN/Creatinine Ratio 11 9 - 23    Sodium 141 134 - 144 mmol/L    Potassium 3.8 3.5 - 5.2 mmol/L    Chloride 104 96 - 106 mmol/L    Total CO2 22 20 - 29 mmol/L     Calcium 8.9 8.7 - 10.2 mg/dL    Total Protein 6.5 6.0 - 8.5 g/dL    Albumin 4.3 3.8 - 4.8 g/dL    Globulin 2.2 1.5 - 4.5 g/dL    A/G Ratio 2.0 1.2 - 2.2    Total Bilirubin 0.8 0.0 - 1.2 mg/dL    Alkaline Phosphatase 50 44 - 121 IU/L    AST (SGOT) 11 0 - 40 IU/L    ALT (SGPT) 9 0 - 32 IU/L   Lipid Panel    Specimen: Arm, Right; Blood    Blood  Release to adelina   Result Value Ref Range    Total Cholesterol 177 100 - 199 mg/dL    Triglycerides 111 0 - 149 mg/dL    HDL Cholesterol 52 >39 mg/dL    VLDL Cholesterol Nawaf 20 5 - 40 mg/dL    LDL Chol Calc ([facility]) 105 (H) 0 - 99 mg/dL   Hemoglobin A1c    Specimen: Arm, Right; Blood    Blood  Release to adelina   Result Value Ref Range    Hemoglobin A1C 5.4 4.8 - 5.6 %   TSH Rfx On Abnormal To Free T4    Specimen: Arm, Right; Blood    Blood  Release to adelina   Result Value Ref Range    TSH 1.770 0.450 - 4.500 uIU/mL   CBC & Differential    Specimen: Arm, Right; Blood    Blood  Release to adelina   Result Value Ref Range    WBC 5.2 3.4 - 10.8 x10E3/uL    RBC 4.64 3.77 - 5.28 x10E6/uL    Hemoglobin 14.8 11.1 - 15.9 g/dL    Hematocrit 43.7 34.0 - 46.6 %    MCV 94 79 - 97 fL    MCH 31.9 26.6 - 33.0 pg    MCHC 33.9 31.5 - 35.7 g/dL    RDW 12.8 11.7 - 15.4 %    Platelets 193 150 - 450 x10E3/uL    Neutrophil Rel % 67 Not Estab. %    Lymphocyte Rel % 25 Not Estab. %    Monocyte Rel % 6 Not Estab. %    Eosinophil Rel % 1 Not Estab. %    Basophil Rel % 1 Not Estab. %    Neutrophils Absolute 3.5 1.4 - 7.0 x10E3/uL    Lymphocytes Absolute 1.3 0.7 - 3.1 x10E3/uL    Monocytes Absolute 0.3 0.1 - 0.9 x10E3/uL    Eosinophils Absolute 0.0 0.0 - 0.4 x10E3/uL    Basophils Absolute 0.0 0.0 - 0.2 x10E3/uL    Immature Granulocyte Rel % 0 Not Estab. %    Immature Grans Absolute 0.0 0.0 - 0.1 x10E3/uL          ECG 12 Lead    Date/Time: 6/8/2023 12:18 PM  Performed by: Harley Rodriguez MD  Authorized by: Harley Rodriguez MD   Comparison: compared with previous ECG from 5/1/2023  Similar to previous  ECG  Comparison to previous ECG: Normal sinus rhythm with sinus arrhythmia rate 60, no acute or chronic ischemic changes, overall normal tracing, no significant change versus prior tracing        Assessment / Plan      Assessment/Plan:   Diagnoses and all orders for this visit:    1. Encounter for general adult medical examination with abnormal findings (Primary)  -     ECG 12 Lead  Healthy 37-year-old female presenting for complete physical as well as preoperative clearance to undergo left COSME in the near future, specific date to be scheduled, given significant lifestyle limiting progressive hip pain with history of hip dysplasia.  Multiple prior surgeries without complication, no contraindications currently, EKG unremarkable, recent lab testing from earlier this month completely normal.  2. Preop examination  -     ECG 12 Lead  EKG unremarkable, recent preop labs unremarkable/normal, previous surgeries well-tolerated, no contraindications, cleared for left COSME to be scheduled in the near future with Dr. Bridger Mancera.  Patient does have orders from Dr. Bridger Mancera to obtain labs EKG and chest x-ray just before her surgery.  Did give her a copy of the EKG today.  3. Osteoarthritis resulting from left hip dysplasia  Pending left hip replacement.  4. Overweight (BMI 25.0-29.9)  Almost 200 pound weight loss since undergoing sleeve gastrectomy 3 years ago.  Continue healthy lifestyle and diet.  5. Anxiety  Doing well taking Paxil 30 mg daily  6. Recurrent major depressive disorder, in full remission  Doing well taking Paxil 30 mg daily  7. Insomnia, unspecified type  Good clinical response taking trazodone 100 mg nightly.  8. Migraine with aura and without status migrainosus, not intractable  Doing well taking Topamax 2 mg twice daily with Imitrex 100 mg as needed, having no migraine headaches since undergoing bilateral daith ear piercings.  9. Constipation, unspecified constipation type  Doing well taking Linzess 145  mcg daily.  10. Gastroesophageal reflux disease, unspecified whether esophagitis present  -     pantoprazole (PROTONIX) 40 MG EC tablet; Take 1 tablet by mouth Daily.  Dispense: 90 tablet; Refill: 3  Patient having breakthrough symptoms taking omeprazole 40 mg daily.  Try switching to pantoprazole 40 mg daily.  Lifestyle changes discussed including avoidance of caffeine, NSAIDs, spicy foods, and avoid eating or drinking at least 2 hours before reclining for bed.  11. Vitamin D deficiency  Recent borderline vitamin D insufficiency, added vitamin D 1000 IU daily.       Healthcare Maintenance:  Counseling provided based on age appropriate USPSTF guidelines.       Ely Lebron Frederic voices understanding and acceptance of this advice and will call back with any further questions or concerns. AVS with preventive healthcare tips printed for patient.     Follow Up:   Return in about 1 year (around 6/8/2024) for Annual physical.      At [facility], we believe that sharing information builds trust and better relationships. You are receiving this note because you recently visited [facility]. It is possible you will see health information before a provider has talked with you about it. This kind of information can be easy to misunderstand. To help you fully understand what it means for your health, we urge you to discuss this note with your provider.    Harley Rodriguez MD  [facility]

## 2023-08-01 ENCOUNTER — OUTSIDE FACILITY SERVICE (OUTPATIENT)
Dept: CARDIOLOGY | Facility: CLINIC | Age: 38
End: 2023-08-01
Payer: COMMERCIAL

## 2023-09-05 ENCOUNTER — HOSPITAL ENCOUNTER (OUTPATIENT)
Dept: HOSPITAL 22 - PT | Age: 38
LOS: 58 days | Discharge: HOME | End: 2023-11-02
Payer: COMMERCIAL

## 2023-09-05 DIAGNOSIS — M16.12: Primary | ICD-10-CM

## 2023-09-05 DIAGNOSIS — Z96.642: ICD-10-CM

## 2023-09-05 PROCEDURE — 97110 THERAPEUTIC EXERCISES: CPT

## 2023-09-05 PROCEDURE — 97163 PT EVAL HIGH COMPLEX 45 MIN: CPT

## 2023-09-05 PROCEDURE — 97530 THERAPEUTIC ACTIVITIES: CPT

## 2023-09-05 PROCEDURE — 97014 ELECTRIC STIMULATION THERAPY: CPT

## 2023-09-05 PROCEDURE — G0283 ELEC STIM OTHER THAN WOUND: HCPCS

## 2023-09-05 PROCEDURE — 97010 HOT OR COLD PACKS THERAPY: CPT

## 2023-09-11 ENCOUNTER — OFFICE VISIT (OUTPATIENT)
Dept: FAMILY MEDICINE CLINIC | Facility: CLINIC | Age: 38
End: 2023-09-11
Payer: COMMERCIAL

## 2023-09-11 VITALS
DIASTOLIC BLOOD PRESSURE: 80 MMHG | HEIGHT: 66 IN | BODY MASS INDEX: 28.9 KG/M2 | OXYGEN SATURATION: 98 % | WEIGHT: 179.8 LBS | HEART RATE: 75 BPM | TEMPERATURE: 98.2 F | SYSTOLIC BLOOD PRESSURE: 118 MMHG

## 2023-09-11 DIAGNOSIS — R42 DIZZINESS ON STANDING: ICD-10-CM

## 2023-09-11 DIAGNOSIS — E86.9 VOLUME DEPLETION: ICD-10-CM

## 2023-09-11 DIAGNOSIS — R11.2 NAUSEA AND VOMITING, UNSPECIFIED VOMITING TYPE: ICD-10-CM

## 2023-09-11 DIAGNOSIS — D62 ANEMIA DUE TO ACUTE BLOOD LOSS: ICD-10-CM

## 2023-09-11 DIAGNOSIS — I95.1 ORTHOSTASIS: Primary | ICD-10-CM

## 2023-09-11 LAB
EXPIRATION DATE: NORMAL
HGB BLDA-MCNC: 14.1 G/DL (ref 12–17)
Lab: NORMAL

## 2023-09-11 PROCEDURE — 99214 OFFICE O/P EST MOD 30 MIN: CPT | Performed by: INTERNAL MEDICINE

## 2023-09-11 PROCEDURE — 85018 HEMOGLOBIN: CPT | Performed by: INTERNAL MEDICINE

## 2023-09-11 RX ORDER — ASCORBIC ACID 500 MG
TABLET ORAL
COMMUNITY
Start: 2023-09-09

## 2023-09-11 RX ORDER — PROMETHAZINE HYDROCHLORIDE 25 MG/1
TABLET ORAL
Qty: 15 TABLET | Refills: 0 | Status: SHIPPED | OUTPATIENT
Start: 2023-09-11

## 2023-09-11 RX ORDER — PANTOPRAZOLE SODIUM 40 MG/1
40 TABLET, DELAYED RELEASE ORAL
Qty: 180 TABLET | Refills: 3 | Status: SHIPPED | OUTPATIENT
Start: 2023-09-11

## 2023-09-11 RX ORDER — BUPROPION HYDROCHLORIDE 300 MG/1
TABLET ORAL
COMMUNITY
Start: 2023-09-07

## 2023-09-11 RX ORDER — FERROUS SULFATE 325(65) MG
TABLET ORAL
COMMUNITY
Start: 2023-09-10

## 2023-09-11 NOTE — PROGRESS NOTES
Follow Up Office Visit      Date: 2023   Patient Name: Ely De La Garza  : 1985   MRN: [id number]     Chief Complaint:    Chief Complaint   Patient presents with    Hospital Follow Up Visit       History of Present Illness: Ely De La Garza is a 37 y.o. female who is here today for follow-up of an ER visit 2 days ago at [facility] on 2023 having presented with dizziness and weakness.  Patient underwent left COSME on 8/3/2023 having had postoperative complication with hip hematoma that was drained via needle aspiration resulting in 7 ounces of blood removed.  She describes 2 weeks history of at least twice daily nausea and vomiting with oral intake, no diarrhea, no fevers or chills.  She is status post sleeve gastrectomy and does take pantoprazole 40 mg daily maintenance.  In the emergency room she had lab testing revealing a hemoglobin of 12.9 with previous baseline of 14.8, creatinine 1.2 with previous baseline 0.66, and was given 1 L of normal saline IV and given Zofran 8 mg IV, sent home with a prescription to take iron sulfate plus vitamin C twice daily and Zofran as needed for nausea.  Indicates she stills feels weaker with some mild positional lightheadedness persisting but has not had any vomiting for the last 2 days now..    Testing from [facility] ER on 2023:  White count 4.4, hemoglobin 12.9, platelets 226,000  CMP with potassium 3.4, BUN 13 with creatinine 1.2, GFR 53, noting previous creatinine of 0.66 and  in 2023    EKG with normal sinus rhythm rate 77 with no abnormalities noted    ER records from [facility] 2023 revealed by me in detail    Subjective      Review of Systems:   Review of Systems    I have reviewed the patients family history, social history, past medical history, past surgical history and have updated it as appropriate.     Medications:     Current Outpatient Medications:     BIOTIN PO, Take   "by mouth., Disp: , Rfl:     buPROPion XL (WELLBUTRIN XL) 150 MG 24 hr tablet, Take 1 tablet by mouth Daily., Disp: , Rfl:     buPROPion XL (WELLBUTRIN XL) 300 MG 24 hr tablet, , Disp: , Rfl:     cholecalciferol (VITAMIN D3) 25 MCG (1000 UT) tablet, Take 1 tablet by mouth Daily., Disp: , Rfl:     linaclotide (LINZESS) 145 MCG capsule capsule, Take 1 capsule by mouth Every Morning Before Breakfast., Disp: , Rfl:     ondansetron (ZOFRAN) 4 MG tablet, Every 8 (Eight) Hours., Disp: , Rfl:     PARoxetine (PAXIL) 30 MG tablet, Take 1 tablet by mouth Daily., Disp: , Rfl:     prazosin (MINIPRESS) 1 MG capsule, Take 1 capsule by mouth every night at bedtime., Disp: , Rfl:     SUMAtriptan (Imitrex) 100 MG tablet, Take 1/2- one tablet at onset of headache. May repeat dose one time in 2 hours if headache not relieved., Disp: 9 tablet, Rfl: 2    SV Iron 325 MG tablet, , Disp: , Rfl:     topiramate (TOPAMAX) 100 MG tablet, Take 1 tablet by mouth twice daily, Disp: 30 tablet, Rfl: 0    traZODone (DESYREL) 100 MG tablet, Take 1 tablet by mouth every night at bedtime., Disp: , Rfl:     vitamin C (ASCORBIC ACID) 500 MG tablet, , Disp: , Rfl:     Iron, Ferrous Sulfate, 325 (65 Fe) MG tablet, Take 1 tablet by mouth Daily. Take with vitamin C 500 mg daily, Disp: , Rfl:     pantoprazole (PROTONIX) 40 MG EC tablet, Take 1 tablet by mouth 2 (Two) Times a Day., Disp: 180 tablet, Rfl: 3    promethazine (PHENERGAN) 25 MG tablet, 1/2 to 1 tablet orally every 6 hours as needed for nausea, caution sedation, Disp: 15 tablet, Rfl: 0    Allergies:   No Known Allergies    Objective     Physical Exam: Please see above  Vital Signs:   Vitals:    09/11/23 1311   BP: 118/80   BP Location: Left arm   Patient Position: Sitting   Cuff Size: Adult   Pulse: 75   Temp: 98.2 °F (36.8 °C)   TempSrc: Temporal   SpO2: 98%   Weight: 81.6 kg (179 lb 12.8 oz)   Height: 167.6 cm (65.98\")     Body mass index is 29.04 kg/m².          Physical Exam  General: Overall " well-appearing 37-year-old female describing just some generalized weakness.  She is able to stand up and get onto the examination table without assistance.  Orthostatic vital signs via automated BP cuff: Supine blood pressure 126/81 pulse 59 and regular; seated /86 with pulse 66; standing /80 with pulse 80, patient denying any current dizziness with positional change  Lungs clear with no wheeze tachypnea or cough  Cardiac regular rate rhythm with no murmurs gallops or rubs  Abdomen with mild generalized discomfort to deep palpation, no rebound or guard, no organomegaly or masses, normal active bowel sounds  Left hip region with a well-healed anterior cervical scar, no hematoma, no significant tenderness, no sign of infection  Neurological exam currently normal    Procedures    Results:   Labs:   Hemoglobin A1C   Date Value Ref Range Status   06/02/2023 5.4 4.8 - 5.6 % Final     Comment:              Prediabetes: 5.7 - 6.4           Diabetes: >6.4           Glycemic control for adults with diabetes: <7.0       TSH   Date Value Ref Range Status   06/02/2023 1.770 0.450 - 4.500 uIU/mL Final        POCT Results (if applicable):   Results for orders placed or performed in visit on 09/11/23   POC Hemoglobin    Specimen: Blood   Result Value Ref Range    Hemoglobin 14.1 12.0 - 17.0 g/dL    Lot Number [id number]     Expiration Date 12/12/2023        Assessment / Plan      Assessment/Plan:   Diagnoses and all orders for this visit:    1. Orthostasis (Primary)  Positional, no true syncope, mild volume depletion having been seen in the emergency room 2 days ago having been given 1 L of IV normal saline, patient still having slight positional dizziness.  She does not meet criteria for true orthostatic hypotension on testing today.  Push oral fluids using Phenergan as needed for nausea, advancing diet as tolerated, advising if not improving.  2. Nausea and vomiting, unspecified vomiting type  -     pantoprazole  (PROTONIX) 40 MG EC tablet; Take 1 tablet by mouth 2 (Two) Times a Day.  Dispense: 180 tablet; Refill: 3  -     promethazine (PHENERGAN) 25 MG tablet; 1/2 to 1 tablet orally every 6 hours as needed for nausea, caution sedation  Dispense: 15 tablet; Refill: 0  Status post gastric sleeve several years ago, does have history of heartburn and suspect may have acute gastritis based on nausea vomiting for last 2 weeks, albeit none for the last 2 days.  Increase pantoprazole 40 mg from daily to twice daily for the next month, prescribing Phenergan tablets given apparent lack of efficacy using Zofran.  3. Dizziness on standing  Refer to above  4. Volume depletion  Mild volume depletion currently, orally rehydrating as noted above.  5. Anemia due to acute blood loss  -     POC Hemoglobin  History of mild anemia postop status post left COSME on 8/3/2023, having had development of hematoma status post drainage approximately 2 weeks ago.  Hemoglobin in the ER 2 days ago was 12.1, increased to 14.1 today.  Taking iron sulfate 325 mg plus vitamin C twice daily as prescribed by ER 2 days ago for presumptive iron deficiency anemia, although I did suggest reducing to daily given no benefit on higher dosing.  We will repeat hemoglobin when she follows up again in 1 month.    Follow Up:   Return in about 1 month (around 10/11/2023) for Recheck.      At [facility], we believe that sharing information builds trust and better relationships. You are receiving this note because you recently visited [facility]. It is possible you will see health information before a provider has talked with you about it. This kind of information can be easy to misunderstand. To help you fully understand what it means for your health, we urge you to discuss this note with your provider.    Harley Rodriguez MD  [facility]

## 2023-09-22 ENCOUNTER — OFFICE VISIT (OUTPATIENT)
Dept: FAMILY MEDICINE CLINIC | Facility: CLINIC | Age: 38
End: 2023-09-22
Payer: COMMERCIAL

## 2023-09-22 VITALS
DIASTOLIC BLOOD PRESSURE: 82 MMHG | TEMPERATURE: 99 F | SYSTOLIC BLOOD PRESSURE: 112 MMHG | OXYGEN SATURATION: 97 % | WEIGHT: 182 LBS | HEIGHT: 66 IN | HEART RATE: 80 BPM | BODY MASS INDEX: 29.25 KG/M2

## 2023-09-22 DIAGNOSIS — I95.1 ORTHOSTATIC HYPOTENSION: ICD-10-CM

## 2023-09-22 DIAGNOSIS — R11.2 NAUSEA AND VOMITING, UNSPECIFIED VOMITING TYPE: ICD-10-CM

## 2023-09-22 DIAGNOSIS — R55 SYNCOPE, UNSPECIFIED SYNCOPE TYPE: ICD-10-CM

## 2023-09-22 DIAGNOSIS — R42 DIZZINESS ON STANDING: Primary | ICD-10-CM

## 2023-09-22 NOTE — PROGRESS NOTES
Follow Up Office Visit      Date: 2023   Patient Name: Ely De La Garza  : 1985   MRN: [id number]     Chief Complaint:    Chief Complaint   Patient presents with    Dizziness    BP dropping     ER wed       History of Present Illness: Ely De La Garza is a 37 y.o. female who is here today for ongoing symptoms of lightheadedness especially with positional change nausea, vomiting and headache, pains or palpitations, having initially been assessed in the emergency room room on 2023, having been given IV fluids and Zofran.  I saw her in the office 2 days later on 2023 with patient describing some ongoing weakness, having very mild orthostasis but still satisfactory blood pressure and pulse rate upon standing. Relates in the last couple weeks since she was seen by me on 2022, having 2 episodes of true syncope with positional change preceded by cloudy tunnel vision and distant voices but again no preceding chest pains or palpitations.  She has had multiple episodes of vomiting with not all but some meals, taking pantoprazole, also complains of increasing migraine headaches over the last 6 weeks since her COSME.  Patient is status post sleeve gastrectomy in 2020 and status post left COSME in 2023 with postop hematoma that was drained via needle. Unfortunately she has continued to have ongoing symptoms and presented back to the emergency room 2 days ago where she reports having borderline hypotension for which she was given 1 L of IV normal saline, given Zofran 4 mg IV push, and KCl 20 mEq x 1 oral dose and discharged home after having very satisfactory laboratory testing including a CMP with a potassium slightly low at 3.4 otherwise normal, CBC normal, urinalysis normal, UCG negative, and UDS revealing positive THC otherwise negative.  Patient is status post sleeve gastrectomy in 2020 and status post left COSME in 2023 with postop hematoma that was drained via needle.    Today I  "personally reviewed ER records from 9/20/2023 at [facility]    Subjective      Review of Systems:   Review of Systems    I have reviewed the patients family history, social history, past medical history, past surgical history and have updated it as appropriate.     Medications:     Current Outpatient Medications:     buPROPion XL (WELLBUTRIN XL) 300 MG 24 hr tablet, , Disp: , Rfl:     cholecalciferol (VITAMIN D3) 25 MCG (1000 UT) tablet, Take 1 tablet by mouth Daily., Disp: , Rfl:     Iron, Ferrous Sulfate, 325 (65 Fe) MG tablet, Take 1 tablet by mouth Daily. Take with vitamin C 500 mg daily, Disp: , Rfl:     linaclotide (LINZESS) 145 MCG capsule capsule, Take 1 capsule by mouth Every Morning Before Breakfast., Disp: , Rfl:     ondansetron (ZOFRAN) 4 MG tablet, Every 8 (Eight) Hours., Disp: , Rfl:     pantoprazole (PROTONIX) 40 MG EC tablet, Take 1 tablet by mouth 2 (Two) Times a Day., Disp: 180 tablet, Rfl: 3    PARoxetine (PAXIL) 30 MG tablet, Take 1 tablet by mouth Daily., Disp: , Rfl:     promethazine (PHENERGAN) 25 MG tablet, 1/2 to 1 tablet orally every 6 hours as needed for nausea, caution sedation, Disp: 15 tablet, Rfl: 0    SUMAtriptan (Imitrex) 100 MG tablet, Take 1/2- one tablet at onset of headache. May repeat dose one time in 2 hours if headache not relieved., Disp: 9 tablet, Rfl: 2    topiramate (TOPAMAX) 100 MG tablet, Take 1 tablet by mouth twice daily, Disp: 30 tablet, Rfl: 0    traZODone (DESYREL) 100 MG tablet, Take 1 tablet by mouth every night at bedtime., Disp: , Rfl:     vitamin C (ASCORBIC ACID) 500 MG tablet, , Disp: , Rfl:     Allergies:   No Known Allergies    Objective     Physical Exam: Please see above  Vital Signs:   Vitals:    09/22/23 1508   BP: 112/82   BP Location: Left arm   Patient Position: Sitting   Cuff Size: Adult   Pulse: 80   Temp: 99 °F (37.2 °C)   TempSrc: Temporal   SpO2: 97%   Weight: 82.6 kg (182 lb)   Height: 167.6 cm (66\")     Body mass index is 29.38 " kg/m².          Physical Exam  General: Generally healthy though subjectively dizzy 37-year-old female who is able ambulate without assistance.  Fully alert and oriented.  No focal neurological symptoms.  BP recordings via automated cuff: Supine /73 with pulse 63 and regular; seated /75 with pulse 79; standing BP 92/68 with pulse 95, having some mild lightheadedness upon standing  Lungs are clear  Cardiac regular rate rhythm with no murmurs gallops or rubs  Neurological exam with no focal abnormalities having some subjective lightheadedness with positional change.    Procedures    Results:   Labs:   Hemoglobin A1C   Date Value Ref Range Status   06/02/2023 5.4 4.8 - 5.6 % Final     Comment:              Prediabetes: 5.7 - 6.4           Diabetes: >6.4           Glycemic control for adults with diabetes: <7.0       TSH   Date Value Ref Range Status   06/02/2023 1.770 0.450 - 4.500 uIU/mL Final        POCT Results (if applicable):   Results for orders placed or performed in visit on 09/11/23   POC Hemoglobin    Specimen: Blood   Result Value Ref Range    Hemoglobin 14.1 12.0 - 17.0 g/dL    Lot Number [id number]     Expiration Date 12/12/2023      Review of laboratory testing from [facility] ER on 9/20/2023 as follows:  CBC normal specifically hemoglobin 13.1, CMP unremarkable other than potassium slightly low at 3.4, noting creatinine 1.0  Urinalysis normal  UCG negative  UDS positive for THC otherwise negative    Assessment / Plan      Assessment/Plan:   Diagnoses and all orders for this visit:    1. Dizziness on standing (Primary)  -     Ambulatory Referral to Cardiology  Patient has had several weeks of significant positional lightheadedness, reportedly having had 2 syncopal episodes in the last couple weeks, with 2 ER visits for this problem most recently 2 days ago, laboratory testing essentially unremarkable, EKG unremarkable, both visits having received 1 L of IV normal saline.   Continues to have struggles with the symptoms.  Differential would potentially include POTS though not classic given no significant tachycardia with positional change along with fact patient does have drop in her blood pressure.  Will refer to cardiology however for further evaluation.  In the interim I have recommended she discontinue her prazosin is a newer medication added for nightmares by psychiatry, which certainly could exacerbate this problem.  Also potential that her other medications including Wellbutrin Topamax Paxil and trazodone could cause similar symptoms but likely than trazodone, also noting she has been on these medications for an extended period of time with no previous related problems.  I did advise that she ensure she is adequately hydrated, avoiding abrupt positional changes, pending cardiology consultation.  If her symptoms resolve after stopping her prazosin then she may cancel this consultation appointment.  2. Orthostatic hypotension  -     Ambulatory Referral to Cardiology  Refer to above.  3. Syncope, unspecified syncope type  -     Ambulatory Referral to Cardiology  Refer to above.  4. Nausea and vomiting, unspecified vomiting type  Likely secondary to above, though does have a history of sleeve gastrectomy may very well have an associated gastritis.  She continues on    Note most recent ER visit from [facility] on 9/20/2023 was reviewed in its entirety.    Follow Up:   Return if symptoms worsen or fail to improve.      At [facility], we believe that sharing information builds trust and better relationships. You are receiving this note because you recently visited [facility]. It is possible you will see health information before a provider has talked with you about it. This kind of information can be easy to misunderstand. To help you fully understand what it means for your health, we urge you to discuss this note with your provider.    Harley Rodriguez MD  [facility] [place]

## 2023-09-28 ENCOUNTER — OFFICE VISIT (OUTPATIENT)
Dept: CARDIOLOGY | Facility: CLINIC | Age: 38
End: 2023-09-28
Payer: COMMERCIAL

## 2023-09-28 VITALS
HEART RATE: 67 BPM | WEIGHT: 183.8 LBS | SYSTOLIC BLOOD PRESSURE: 118 MMHG | HEIGHT: 66 IN | BODY MASS INDEX: 29.54 KG/M2 | OXYGEN SATURATION: 99 % | DIASTOLIC BLOOD PRESSURE: 80 MMHG

## 2023-09-28 DIAGNOSIS — R00.2 PALPITATIONS: ICD-10-CM

## 2023-09-28 DIAGNOSIS — R42 DIZZINESS ON STANDING: ICD-10-CM

## 2023-09-28 DIAGNOSIS — R55 SYNCOPE, UNSPECIFIED SYNCOPE TYPE: Primary | ICD-10-CM

## 2023-09-28 NOTE — PROGRESS NOTES
[facility] Provider Note     Date:   2023   Name: Ely De La Garza  :   1985  PCP: Harley Rodriguez MD    Chief Complaint   Patient presents with   • Hospital Follow Up Visit     Seen at [facility] and was referred for possible POTS        Subjective     History of Present Illness  Ely De La Garza is a 37 y.o. female who presents today for new patient evaluation for dizziness and syncope.  Patient reports that she had sudden onset of dizziness and lightheadedness that started at the beginning of August after having a left hip replacement surgery.  She developed a postsurgical hematoma which had to be drained.  She reports that since that time she has had multiple near-syncopal episodes and 2 syncopal episodes with subsequent ER visits.  She reports that the symptoms occur immediately after standing up, her legs began to shake and get weak and she becomes lightheaded and almost passes out.  Symptoms resolve when she lies down.  She has multiple episodes every day.  She experiences shortness of breath and occasionally notices her heart racing during the episodes. She also feels like she becomes easily fatigued now.  She has no previous cardiac history.  Her mother has congestive heart failure and her father has a history of an MI in his early 50s.    No specialty comments available.     Reports Denies   Chest Pain [] [x]   Shortness of Air [x] []   Palpitations [x] []   Edema [] []   Dizziness [x] []   Syncope [x] []       No Known Allergies    Current Outpatient Medications:   •  buPROPion XL (WELLBUTRIN XL) 300 MG 24 hr tablet, , Disp: , Rfl:   •  cholecalciferol (VITAMIN D3) 25 MCG (1000 UT) tablet, Take 1 tablet by mouth Daily., Disp: , Rfl:   •  Iron, Ferrous Sulfate, 325 (65 Fe) MG tablet, Take 1 tablet by mouth Daily. Take with vitamin C 500 mg daily, Disp: , Rfl:   •  linaclotide (LINZESS) 145 MCG capsule capsule, Take 1 capsule by mouth Every Morning  Before Breakfast., Disp: , Rfl:   •  ondansetron (ZOFRAN) 4 MG tablet, Every 8 (Eight) Hours., Disp: , Rfl:   •  pantoprazole (PROTONIX) 40 MG EC tablet, Take 1 tablet by mouth 2 (Two) Times a Day., Disp: 180 tablet, Rfl: 3  •  PARoxetine (PAXIL) 30 MG tablet, Take 1 tablet by mouth Daily., Disp: , Rfl:   •  promethazine (PHENERGAN) 25 MG tablet, 1/2 to 1 tablet orally every 6 hours as needed for nausea, caution sedation, Disp: 15 tablet, Rfl: 0  •  SUMAtriptan (Imitrex) 100 MG tablet, Take 1/2- one tablet at onset of headache. May repeat dose one time in 2 hours if headache not relieved., Disp: 9 tablet, Rfl: 2  •  topiramate (TOPAMAX) 100 MG tablet, Take 1 tablet by mouth twice daily, Disp: 30 tablet, Rfl: 0  •  traZODone (DESYREL) 100 MG tablet, Take 1 tablet by mouth every night at bedtime., Disp: , Rfl:   •  vitamin C (ASCORBIC ACID) 500 MG tablet, , Disp: , Rfl:     Past Medical History:   Diagnosis Date   • Abnormal findings on diagnostic imaging of breast    • Contact with and (suspected) exposure to other viral communicable diseases    • Cutaneous abscess of back (any part, except buttock)    • Depression    • Diarrhea    • Encounter for insertion of mirena IUD 12/30/2019    Due for removal 12/30/2025   • Encounter for observation for suspected exposure to other biological agents ruled out    • Esophageal reflux    • Fever    • Flu    • Generalized anxiety disorder    • GERD (gastroesophageal reflux disease)    • Goiter     Pt states resolved   • Headache    • Herpes zoster 2003   • History of COVID-19    • Lymphedema    • Migraine without aura, not intractable, without status migrainosus    • Mixed hyperlipidemia    • Morbid obesity    • Nausea and vomiting    • Obese    • Other injury of unspecified body region, initial encounter    • Panic disorder    • Paresthesia of skin    • Polycystic ovaries    • PONV (postoperative nausea and vomiting)    • Prediabetes    • Sacrococcygeal disorders, not elsewhere  classified    • Subacute cough    • Trochanteric bursitis of left hip    • Type 2 diabetes mellitus    • Viral infection    • Wears contact lenses    • Wears eyeglasses       Past Surgical History:   Procedure Laterality Date   • BODY LIFT N/A 2017    Procedure: CIRCUMFERENTIAL BODY LIFT;  Surgeon: Enrique Leroy MD;  Location:  [facility];  Service:    • BODY LIFT Bilateral 2019    Procedure: BILATERAL THIGH  LIFT WITH LIPOSUCTION;  Surgeon: Enrique Leroy MD;  Location:  [facility];  Service: Plastics   • BREAST AUGMENTATION     •  SECTION     • GASTRIC SLEEVE LAPAROSCOPIC  08/10/2020   • INTRAUTERINE DEVICE INSERTION  2019    Mirena; due for removal 2025   • LIPOMA EXCISION     • LIPOSUCTION ABDOMINAL N/A 2019    Procedure: LIPOSUCTION ABDOMINAL;  Surgeon: Enrique Leroy MD;  Location:  [facility];  Service: Plastics   • LUNG BIOPSY     • OTHER SURGICAL HISTORY      Back Lift   • TONSILLECTOMY       Family History   Problem Relation Age of Onset   • Heart disease Mother    • Heart disease Father    • Diabetes Father    • Cancer Father    • Heart disease Maternal Grandmother    • Thyroid disease Maternal Grandmother    • Cervical cancer Maternal Grandmother    • Hypertension Other    • Obesity Other    • Hypertension Other    • Breast cancer Other         2 SISTERS OF MATERNAL GRANDFATHER     Social History     Socioeconomic History   • Marital status:    • Number of children: 2   Tobacco Use   • Smoking status: Former     Packs/day: 1.50     Years: 10.00     Pack years: 15.00     Types: Cigarettes     Quit date:      Years since quitting: 15.7     Passive exposure: Never   • Smokeless tobacco: Never   • Tobacco comments:     quit    Vaping Use   • Vaping Use: Never used   Substance and Sexual Activity   • Alcohol use: No   • Drug use: Never   • Sexual activity: Yes     Partners: Male     Birth control/protection: I.U.D.       Objective     Vital Signs:  BP  "118/80 (BP Location: Left arm, Patient Position: Sitting, Cuff Size: Adult)   Pulse 67   Ht 167.6 cm (66\")   Wt 83.4 kg (183 lb 12.8 oz)   SpO2 99%   BMI 29.67 kg/m²   Estimated body mass index is 29.67 kg/m² as calculated from the following:    Height as of this encounter: 167.6 cm (66\").    Weight as of this encounter: 83.4 kg (183 lb 12.8 oz).           Physical Exam  Constitutional:       Appearance: Normal appearance. She is well-developed.   HENT:      Head: Normocephalic and atraumatic.   Eyes:      Pupils: Pupils are equal, round, and reactive to light.   Neck:      Vascular: No carotid bruit.   Cardiovascular:      Rate and Rhythm: Normal rate and regular rhythm.      Pulses: Normal pulses.      Heart sounds: Normal heart sounds. No murmur heard.  Pulmonary:      Breath sounds: Normal breath sounds. No wheezing or rhonchi.   Musculoskeletal:      Right lower leg: No edema.      Left lower leg: No edema.   Skin:     Capillary Refill: Capillary refill takes less than 2 seconds.      Coloration: Skin is not cyanotic.      Nails: There is no clubbing.   Neurological:      Mental Status: She is alert and oriented to person, place, and time.      Motor: No weakness.      Gait: Gait normal.   Psychiatric:         Mood and Affect: Mood normal.         Behavior: Behavior is cooperative.         Thought Content: Thought content normal.         Cognition and Memory: Memory normal.         Consultant notes reviewed: PCP note reviewed           Assessment and Plan     Diagnoses and all orders for this visit:    1. Syncope, unspecified syncope type (Primary)  Assessment & Plan:  2 syncopal episodes in the last month.  She was evaluated in the ER twice and work-up was unremarkable.  She was given IV fluids and medication for nausea and discharged home.  - 7-day Holter monitor, echocardiogram and nuclear stress test for further evaluation and management.  - Patient recently underwent a left total hip replacement 1 " month ago and has not fully recovered from that surgery.  She cannot walk on a treadmill at this time.  She will need a nuclear stress test.    Orders:  -     Adult Transthoracic Echo Complete W/ Cont if Necessary Per Protocol; Future  -     Holter Monitor - 72 Hour Up To 15 Days  -     Stress Test With Myocardial Perfusion One Day; Future    2. Dizziness on standing  Assessment & Plan:  Holter monitor and echocardiogram for further evaluation    Orders:  -     Adult Transthoracic Echo Complete W/ Cont if Necessary Per Protocol; Future  -     Holter Monitor - 72 Hour Up To 15 Days    3. Palpitations  Assessment & Plan:  Occasional palpitations and tachycardia.  - 7-day Holter monitor    Orders:  -     Adult Transthoracic Echo Complete W/ Cont if Necessary Per Protocol; Future  -     Holter Monitor - 72 Hour Up To 15 Days  -     Stress Test With Myocardial Perfusion One Day; Future        Recommendations: ER if symptoms increase and Report if any new/changing symptoms immediately          Follow Up  Return in about 4 weeks (around 10/26/2023) for cardiac testing results.  Patient was given instructions and counseling regarding her condition or for health maintenance advice. Please see specific information pulled into the AVS if appropriate.

## 2023-09-28 NOTE — ASSESSMENT & PLAN NOTE
2 syncopal episodes in the last month.  She was evaluated in the ER twice and work-up was unremarkable.  She was given IV fluids and medication for nausea and discharged home.  - 7-day Holter monitor, echocardiogram and nuclear stress test for further evaluation and management.  - Patient recently underwent a left total hip replacement 1 month ago and has not fully recovered from that surgery.  She cannot walk on a treadmill at this time.  She will need a nuclear stress test.

## 2023-10-20 RX ORDER — LINACLOTIDE 145 UG/1
145 CAPSULE, GELATIN COATED ORAL
Qty: 90 CAPSULE | Refills: 0 | Status: SHIPPED | OUTPATIENT
Start: 2023-10-20

## 2023-10-26 ENCOUNTER — OUTSIDE FACILITY SERVICE (OUTPATIENT)
Dept: CARDIOLOGY | Facility: CLINIC | Age: 38
End: 2023-10-26
Payer: COMMERCIAL

## 2023-10-26 DIAGNOSIS — R00.2 PALPITATIONS: ICD-10-CM

## 2023-10-26 DIAGNOSIS — R55 SYNCOPE, UNSPECIFIED SYNCOPE TYPE: ICD-10-CM

## 2023-10-31 ENCOUNTER — OFFICE VISIT (OUTPATIENT)
Dept: CARDIOLOGY | Facility: CLINIC | Age: 38
End: 2023-10-31
Payer: COMMERCIAL

## 2023-10-31 VITALS
BODY MASS INDEX: 28.93 KG/M2 | HEIGHT: 66 IN | DIASTOLIC BLOOD PRESSURE: 60 MMHG | OXYGEN SATURATION: 93 % | HEART RATE: 75 BPM | WEIGHT: 180 LBS | SYSTOLIC BLOOD PRESSURE: 104 MMHG

## 2023-10-31 DIAGNOSIS — G90.A POTS (POSTURAL ORTHOSTATIC TACHYCARDIA SYNDROME): Primary | ICD-10-CM

## 2023-10-31 PROCEDURE — 99214 OFFICE O/P EST MOD 30 MIN: CPT | Performed by: NURSE PRACTITIONER

## 2023-10-31 RX ORDER — MIDODRINE HYDROCHLORIDE 2.5 MG/1
2.5 TABLET ORAL
Qty: 90 TABLET | Refills: 3 | Status: SHIPPED | OUTPATIENT
Start: 2023-10-31

## 2023-10-31 RX ORDER — CARIPRAZINE 1.5 MG/1
1 CAPSULE, GELATIN COATED ORAL DAILY
COMMUNITY
Start: 2023-10-04

## 2023-10-31 RX ORDER — PRAZOSIN HYDROCHLORIDE 1 MG/1
1 CAPSULE ORAL
COMMUNITY
Start: 2023-10-09

## 2023-10-31 NOTE — ASSESSMENT & PLAN NOTE
Patient has had multiple episodes of dizziness, near syncope and syncope.  Cardiac work-up was unremarkable. Her blood pressure consistently averages 100/60s and sometimes drops to 80s/40s.  Orthostatic vital signs obtained in office today.  Lying BP and heart rate was 110/78 and 63 bpm.  Sitting BP was 102/70 and 68 bpm.  Standing BP was 98/64 and 93 bpm.  Her symptoms are consistent with POTS syndrome.    -Increase water intake to maintain adequate fluid status  - Increase sodium in diet  - Compression stockings  - Trial of midodrine 2.5 mg 3 times a day  - Encourage patient to lie down flat when she starts to experience symptoms to avoid syncope.  - Follow-up in 1 month

## 2023-10-31 NOTE — PROGRESS NOTES
[facility] Provider Note     Date:   10/31/2023   Name: Ely De La Garza  :   1985  PCP: Harley Rodriguez MD    Chief Complaint   Patient presents with    Syncope     Makayla/Echo Results       Subjective     History of Present Illness  Ely De La Garza is a 38 y.o. female who presents today for follow up on cardiac testing.  At initial office visit on 2023, patient reported frequent episodes of dizziness and syncope.  She reported that she had  sudden onset of dizziness and lightheadedness that started at the beginning of August after having a left hip replacement surgery.  Since that time she has had multiple near-syncopal and syncopal episodes.  She reports that she has had 3 syncopal episodes since her last office visit.  Symptoms occur immediately after standing up, her legs began to shake and get weak and she becomes lightheaded and almost passes out.  She has multiple episodes of dizziness per day. She has no previous cardiac history.  Her mother has congestive heart failure and her father has a history of an MI in his early 50s.  Cardiac work-up was ordered at last office visit.  Holter monitor revealed a normal monitor study, no significant pauses or arrhythmias.  Echocardiogram from 10/3/2023 revealed an LVEF of 66%.  Left ventricular diastolic function was normal.  Moderate, bileaflet mitral valve thickening.  RVSP is normal.  She completed a nuclear stress test on 10/26/2023 that revealed no scintigraphic evidence of ischemia.  Her blood pressure consistently averages 100/60s and sometimes drops to 80s/40s.  Orthostatic vital signs obtained in office today.  Lying BP and heart rate was 110/78 and 63 bpm.  Sitting BP was 102/70 and 68 bpm.  Standing BP was 98/64 and 93 bpm.  Her symptoms are consistent with POTS syndrome.    Nuclear stress test 10/26/2023-no scintigraphic evidence of ischemia    Echocardiogram 10/3/2023-LVEF 66%.  Left ventricular diastolic  function was normal.  Moderate, bileaflet mitral valve thickening present.  RVSP is normal.    Holter monitor 9/28/2023-normal monitor study.  No significant pauses or arrhythmias.    No Known Allergies    Current Outpatient Medications:     buPROPion XL (WELLBUTRIN XL) 300 MG 24 hr tablet, , Disp: , Rfl:     cholecalciferol (VITAMIN D3) 25 MCG (1000 UT) tablet, Take 1 tablet by mouth Daily., Disp: , Rfl:     Iron, Ferrous Sulfate, 325 (65 Fe) MG tablet, Take 1 tablet by mouth Daily. Take with vitamin C 500 mg daily, Disp: , Rfl:     Linzess 145 MCG capsule capsule, TAKE 1 CAPSULE BY MOUTH ONCE DAILY IN THE MORNING BEFORE BREAKFAST, Disp: 90 capsule, Rfl: 0    ondansetron (ZOFRAN) 4 MG tablet, Every 8 (Eight) Hours., Disp: , Rfl:     pantoprazole (PROTONIX) 40 MG EC tablet, Take 1 tablet by mouth 2 (Two) Times a Day., Disp: 180 tablet, Rfl: 3    PARoxetine (PAXIL) 30 MG tablet, Take 1 tablet by mouth Daily., Disp: , Rfl:     prazosin (MINIPRESS) 1 MG capsule, Take 1 capsule by mouth every night at bedtime., Disp: , Rfl:     promethazine (PHENERGAN) 25 MG tablet, 1/2 to 1 tablet orally every 6 hours as needed for nausea, caution sedation, Disp: 15 tablet, Rfl: 0    SUMAtriptan (Imitrex) 100 MG tablet, Take 1/2- one tablet at onset of headache. May repeat dose one time in 2 hours if headache not relieved., Disp: 9 tablet, Rfl: 2    topiramate (TOPAMAX) 100 MG tablet, Take 1 tablet by mouth twice daily, Disp: 30 tablet, Rfl: 0    traZODone (DESYREL) 100 MG tablet, Take 1 tablet by mouth every night at bedtime., Disp: , Rfl:     vitamin C (ASCORBIC ACID) 500 MG tablet, , Disp: , Rfl:     Vraylar 1.5 MG capsule capsule, Take 1 capsule by mouth Daily., Disp: , Rfl:     midodrine (PROAMATINE) 2.5 MG tablet, Take 1 tablet by mouth 3 (Three) Times a Day Before Meals., Disp: 90 tablet, Rfl: 3    Past Medical History:   Diagnosis Date    Abnormal findings on diagnostic imaging of breast     Contact with and (suspected)  exposure to other viral communicable diseases     Cutaneous abscess of back (any part, except buttock)     Depression     Diarrhea     Encounter for insertion of mirena IUD 2019    Due for removal 2025    Encounter for observation for suspected exposure to other biological agents ruled out     Esophageal reflux     Fever     Flu     Generalized anxiety disorder     GERD (gastroesophageal reflux disease)     Goiter     Pt states resolved    Headache     Herpes zoster     History of COVID-19     Lymphedema     Migraine without aura, not intractable, without status migrainosus     Mixed hyperlipidemia     Morbid obesity     Nausea and vomiting     Obese     Other injury of unspecified body region, initial encounter     Panic disorder     Paresthesia of skin     Polycystic ovaries     PONV (postoperative nausea and vomiting)     Prediabetes     Sacrococcygeal disorders, not elsewhere classified     Subacute cough     Trochanteric bursitis of left hip     Type 2 diabetes mellitus     Viral infection     Wears contact lenses     Wears eyeglasses       Past Surgical History:   Procedure Laterality Date    BODY LIFT N/A 2017    Procedure: CIRCUMFERENTIAL BODY LIFT;  Surgeon: Enrique Leroy MD;  Location:  [facility];  Service:     BODY LIFT Bilateral 2019    Procedure: BILATERAL THIGH  LIFT WITH LIPOSUCTION;  Surgeon: Enrique Leroy MD;  Location:  [facility];  Service: Plastics    BREAST AUGMENTATION       SECTION      GASTRIC SLEEVE LAPAROSCOPIC  08/10/2020    INTRAUTERINE DEVICE INSERTION  2019    Mirena; due for removal 2025    LIPOMA EXCISION      LIPOSUCTION ABDOMINAL N/A 2019    Procedure: LIPOSUCTION ABDOMINAL;  Surgeon: Enrique Leroy MD;  Location:  [facility];  Service: Plastics    LUNG BIOPSY      OTHER SURGICAL HISTORY      Back Lift    TONSILLECTOMY       Family History   Problem Relation Age of Onset    Heart disease Mother     Heart disease Father      "Diabetes Father     Cancer Father     Heart disease Maternal Grandmother     Thyroid disease Maternal Grandmother     Cervical cancer Maternal Grandmother     Hypertension Other     Obesity Other     Hypertension Other     Breast cancer Other         2 SISTERS OF MATERNAL GRANDFATHER     Social History     Socioeconomic History    Marital status:     Number of children: 2   Tobacco Use    Smoking status: Former     Packs/day: 1.50     Years: 10.00     Additional pack years: 0.00     Total pack years: 15.00     Types: Cigarettes     Quit date: 2008     Years since quitting: 15.8     Passive exposure: Never    Smokeless tobacco: Never    Tobacco comments:     quit 2008   Vaping Use    Vaping Use: Never used   Substance and Sexual Activity    Alcohol use: No    Drug use: Never    Sexual activity: Yes     Partners: Male     Birth control/protection: I.U.D.       Objective     Vital Signs:  /60   Pulse 75   Ht 167.6 cm (66\")   Wt 81.6 kg (180 lb)   SpO2 93%   BMI 29.05 kg/m²   Estimated body mass index is 29.05 kg/m² as calculated from the following:    Height as of this encounter: 167.6 cm (66\").    Weight as of this encounter: 81.6 kg (180 lb).               Physical Exam  Vitals reviewed.   Constitutional:       Appearance: Normal appearance.   HENT:      Head: Normocephalic.   Cardiovascular:      Rate and Rhythm: Normal rate and regular rhythm.      Heart sounds: Normal heart sounds.   Pulmonary:      Effort: Pulmonary effort is normal.      Breath sounds: Normal breath sounds.   Musculoskeletal:      Right lower leg: No edema.      Left lower leg: No edema.   Skin:     General: Skin is warm and dry.      Capillary Refill: Capillary refill takes less than 2 seconds.   Neurological:      General: No focal deficit present.      Mental Status: She is alert and oriented to person, place, and time.   Psychiatric:         Mood and Affect: Mood normal.         Behavior: Behavior normal. "           Cardiology studies reviewed: Echocardiogram nuclear stress test and Holter monitor reviewed          Assessment and Plan     Diagnoses and all orders for this visit:    1. POTS (postural orthostatic tachycardia syndrome) (Primary)  Assessment & Plan:  Patient has had multiple episodes of dizziness, near syncope and syncope.  Cardiac work-up was unremarkable. Her blood pressure consistently averages 100/60s and sometimes drops to 80s/40s.  Orthostatic vital signs obtained in office today.  Lying BP and heart rate was 110/78 and 63 bpm.  Sitting BP was 102/70 and 68 bpm.  Standing BP was 98/64 and 93 bpm.  Her symptoms are consistent with POTS syndrome.    -Increase water intake to maintain adequate fluid status  - Increase sodium in diet  - Compression stockings  - Trial of midodrine 2.5 mg 3 times a day  - Encourage patient to lie down flat when she starts to experience symptoms to avoid syncope.  - Follow-up in 1 month    Orders:  -     Compression Stockings    Other orders  -     midodrine (PROAMATINE) 2.5 MG tablet; Take 1 tablet by mouth 3 (Three) Times a Day Before Meals.  Dispense: 90 tablet; Refill: 3        Recommendations: Report if any new/changing symptoms immediately and Compression hose          Follow Up  Return in about 4 weeks (around 11/28/2023) for Follow up with Dr Mancera .  Patient was given instructions and counseling regarding her condition or for health maintenance advice. Please see specific information pulled into the AVS if appropriate.

## 2023-11-01 ENCOUNTER — TELEPHONE (OUTPATIENT)
Dept: CARDIOLOGY | Facility: CLINIC | Age: 38
End: 2023-11-01
Payer: COMMERCIAL

## 2023-11-01 DIAGNOSIS — G90.A POTS (POSTURAL ORTHOSTATIC TACHYCARDIA SYNDROME): Primary | ICD-10-CM

## 2023-11-01 NOTE — TELEPHONE ENCOUNTER
PAULY PEREZ [facility] CALLED IN STATING THAT THE ORDER NEEDS TO INDICATE EITHER 20-30 [facility] OR 30-40 [facility]?  IF YOU WILL JUST PLACE ANOTHER ORDER OR IF YOU CAN ADDEND THE CURRENT ONE?

## 2023-11-08 ENCOUNTER — LAB (OUTPATIENT)
Dept: OBSTETRICS AND GYNECOLOGY | Facility: CLINIC | Age: 38
End: 2023-11-08
Payer: COMMERCIAL

## 2023-11-08 DIAGNOSIS — Z32.00 UNCONFIRMED PREGNANCY: Primary | ICD-10-CM

## 2023-11-09 LAB — HCG INTACT+B SERPL-ACNC: <1 MIU/ML

## 2023-11-14 ENCOUNTER — OFFICE VISIT (OUTPATIENT)
Dept: OBSTETRICS AND GYNECOLOGY | Facility: CLINIC | Age: 38
End: 2023-11-14
Payer: COMMERCIAL

## 2023-11-14 VITALS
DIASTOLIC BLOOD PRESSURE: 74 MMHG | SYSTOLIC BLOOD PRESSURE: 112 MMHG | BODY MASS INDEX: 28.7 KG/M2 | HEIGHT: 66 IN | WEIGHT: 178.6 LBS

## 2023-11-14 DIAGNOSIS — E28.2 PCOS (POLYCYSTIC OVARIAN SYNDROME): ICD-10-CM

## 2023-11-14 DIAGNOSIS — Z30.09 FAMILY PLANNING: Primary | ICD-10-CM

## 2023-11-14 NOTE — PROGRESS NOTES
CC: trying to conceive    Subjective   HPI  Ely De La Garza is a 38 y.o. female, .  Patient's last menstrual period was 2023 (exact date). who presents for preconceptual counseling.    Her periods are regular every 28-30 days, lasting  4-5  days.  Dysmenorrhea:mild, occurring  3-4 days are severe . The patient reports additional symptoms as none.  She is currently trying to conceive. She has intercourse approximately 3 times per week. Her past medical history is noted for: PCOS.  Partner Status: Marital Status: .  His past medical history is notable for no medical issues..  She and her  have one child.  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  She used Femara one time with success.    Current Outpatient Medications on File Prior to Visit   Medication Sig Dispense Refill    buPROPion XL (WELLBUTRIN XL) 300 MG 24 hr tablet       cholecalciferol (VITAMIN D3) 25 MCG (1000 UT) tablet Take 1 tablet by mouth Daily.      Iron, Ferrous Sulfate, 325 (65 Fe) MG tablet Take 1 tablet by mouth Daily. Take with vitamin C 500 mg daily      Linzess 145 MCG capsule capsule TAKE 1 CAPSULE BY MOUTH ONCE DAILY IN THE MORNING BEFORE BREAKFAST 90 capsule 0    midodrine (PROAMATINE) 2.5 MG tablet Take 1 tablet by mouth 3 (Three) Times a Day Before Meals. 90 tablet 3    ondansetron (ZOFRAN) 4 MG tablet Every 8 (Eight) Hours.      pantoprazole (PROTONIX) 40 MG EC tablet Take 1 tablet by mouth 2 (Two) Times a Day. 180 tablet 3    PARoxetine (PAXIL) 30 MG tablet Take 1 tablet by mouth Daily.      prazosin (MINIPRESS) 1 MG capsule Take 1 capsule by mouth every night at bedtime.      promethazine (PHENERGAN) 25 MG tablet 1/2 to 1 tablet orally every 6 hours as needed for nausea, caution sedation 15 tablet 0    SUMAtriptan (Imitrex) 100 MG tablet Take 1/2- one tablet at onset of headache. May repeat dose one time in 2 hours if headache not relieved. 9 tablet 2    topiramate (TOPAMAX) 100  MG tablet Take 1 tablet by mouth twice daily 30 tablet 0    traZODone (DESYREL) 100 MG tablet Take 1 tablet by mouth every night at bedtime.      vitamin C (ASCORBIC ACID) 500 MG tablet       Vraylar 1.5 MG capsule capsule Take 1 capsule by mouth Daily.       No current facility-administered medications on file prior to visit.        Additional OB/GYN History   Last Pap : 2022- Neg  Last Completed Pap Smear            PAP SMEAR (Every 3 Years) Next due on 2022  Pap IG, Rfx HPV ASCU    2021  Pap IG, HPV-hr    2019  Done - Negative                  History of abnormal Pap smear: no  Exercises Regularly: yes  Feelings of Anxiety or Depression: yes-managed with medication  Tobacco Usage?: No   OB History          1    Para   1    Term   1            AB        Living   1         SAB        IAB        Ectopic        Molar        Multiple        Live Births   1                  Current Outpatient Medications:     buPROPion XL (WELLBUTRIN XL) 300 MG 24 hr tablet, , Disp: , Rfl:     cholecalciferol (VITAMIN D3) 25 MCG (1000 UT) tablet, Take 1 tablet by mouth Daily., Disp: , Rfl:     Iron, Ferrous Sulfate, 325 (65 Fe) MG tablet, Take 1 tablet by mouth Daily. Take with vitamin C 500 mg daily, Disp: , Rfl:     Linzess 145 MCG capsule capsule, TAKE 1 CAPSULE BY MOUTH ONCE DAILY IN THE MORNING BEFORE BREAKFAST, Disp: 90 capsule, Rfl: 0    midodrine (PROAMATINE) 2.5 MG tablet, Take 1 tablet by mouth 3 (Three) Times a Day Before Meals., Disp: 90 tablet, Rfl: 3    ondansetron (ZOFRAN) 4 MG tablet, Every 8 (Eight) Hours., Disp: , Rfl:     pantoprazole (PROTONIX) 40 MG EC tablet, Take 1 tablet by mouth 2 (Two) Times a Day., Disp: 180 tablet, Rfl: 3    PARoxetine (PAXIL) 30 MG tablet, Take 1 tablet by mouth Daily., Disp: , Rfl:     prazosin (MINIPRESS) 1 MG capsule, Take 1 capsule by mouth every night at bedtime., Disp: , Rfl:     promethazine (PHENERGAN) 25 MG tablet, 1/2 to 1  tablet orally every 6 hours as needed for nausea, caution sedation, Disp: 15 tablet, Rfl: 0    SUMAtriptan (Imitrex) 100 MG tablet, Take 1/2- one tablet at onset of headache. May repeat dose one time in 2 hours if headache not relieved., Disp: 9 tablet, Rfl: 2    topiramate (TOPAMAX) 100 MG tablet, Take 1 tablet by mouth twice daily, Disp: 30 tablet, Rfl: 0    traZODone (DESYREL) 100 MG tablet, Take 1 tablet by mouth every night at bedtime., Disp: , Rfl:     vitamin C (ASCORBIC ACID) 500 MG tablet, , Disp: , Rfl:     Vraylar 1.5 MG capsule capsule, Take 1 capsule by mouth Daily., Disp: , Rfl:      Past Medical History:   Diagnosis Date    Abnormal findings on diagnostic imaging of breast     Contact with and (suspected) exposure to other viral communicable diseases     Cutaneous abscess of back (any part, except buttock)     Depression     Diarrhea     Encounter for insertion of mirena IUD 12/30/2019    Due for removal 12/30/2025    Encounter for observation for suspected exposure to other biological agents ruled out     Esophageal reflux     Fever     Flu     Generalized anxiety disorder     GERD (gastroesophageal reflux disease)     Goiter     Pt states resolved    Headache     Herpes zoster 2003    History of COVID-19     Lymphedema     Migraine without aura, not intractable, without status migrainosus     Mixed hyperlipidemia     Morbid obesity     Nausea and vomiting     Obese     Other injury of unspecified body region, initial encounter     Panic disorder     Paresthesia of skin     Polycystic ovaries     PONV (postoperative nausea and vomiting)     Prediabetes     Sacrococcygeal disorders, not elsewhere classified     Subacute cough     Trochanteric bursitis of left hip     Type 2 diabetes mellitus     Viral infection     Wears contact lenses     Wears eyeglasses         Past Surgical History:   Procedure Laterality Date    BODY LIFT N/A 1/26/2017    Procedure: CIRCUMFERENTIAL BODY LIFT;  Surgeon: Enrique Louis  "MD Rad;  Location:  [facility];  Service:     BODY LIFT Bilateral 2019    Procedure: BILATERAL THIGH  LIFT WITH LIPOSUCTION;  Surgeon: Enrique Leroy MD;  Location:  [facility];  Service: Plastics    BREAST AUGMENTATION       SECTION      GASTRIC SLEEVE LAPAROSCOPIC  08/10/2020    INTRAUTERINE DEVICE INSERTION  2019    Mirena; due for removal 2025    LIPOMA EXCISION      LIPOSUCTION ABDOMINAL N/A 2019    Procedure: LIPOSUCTION ABDOMINAL;  Surgeon: Enrique Leroy MD;  Location:  [facility];  Service: Plastics    LUNG BIOPSY      OTHER SURGICAL HISTORY      Back Lift    TONSILLECTOMY         The additional following portions of the patient's history were reviewed and updated as appropriate: allergies, current medications, past family history, past medical history, past social history, and past surgical history.    Review of Systems  All other systems reviewed and are negative.     I have reviewed and agree with the HPI, ROS, and historical information as entered above. Huong Dwyer, APRN      Objective   /74 (BP Location: Right arm, Patient Position: Sitting, Cuff Size: Adult)   Ht 167.6 cm (65.98\")   Wt 81 kg (178 lb 9.6 oz)   LMP 2023 (Exact Date)   BMI 28.84 kg/m²     Physical Exam  Vitals and nursing note reviewed.   Constitutional:       General: She is not in acute distress.     Appearance: Normal appearance. She is not ill-appearing.   Pulmonary:      Effort: Pulmonary effort is normal. No respiratory distress.   Skin:     General: Skin is warm and dry.   Neurological:      Mental Status: She is alert and oriented to person, place, and time.   Psychiatric:         Mood and Affect: Mood normal.         Behavior: Behavior normal.         Assessment & Plan     Assessment and Plan    Problem List Items Addressed This Visit    None  Visit Diagnoses       Family planning    -  Primary    PCOS (polycystic ovarian syndrome)                D/w pt RTO as scheduled for " annual and add US.  Routine labs drawn.  If US wnl, will start Femara.  Start daily PNV.  Return for Next scheduled follow up, US FU.      Huong Dwyer, APRN  11/14/2023

## 2023-11-27 DIAGNOSIS — G43.709 CHRONIC MIGRAINE WITHOUT AURA WITHOUT STATUS MIGRAINOSUS, NOT INTRACTABLE: ICD-10-CM

## 2023-11-27 RX ORDER — TOPIRAMATE 50 MG/1
50 TABLET, FILM COATED ORAL NIGHTLY
Qty: 90 TABLET | Refills: 0 | Status: SHIPPED | OUTPATIENT
Start: 2023-11-27

## 2023-11-29 ENCOUNTER — OFFICE VISIT (OUTPATIENT)
Dept: OBSTETRICS AND GYNECOLOGY | Facility: CLINIC | Age: 38
End: 2023-11-29
Payer: COMMERCIAL

## 2023-11-29 VITALS
SYSTOLIC BLOOD PRESSURE: 126 MMHG | DIASTOLIC BLOOD PRESSURE: 78 MMHG | HEIGHT: 66 IN | BODY MASS INDEX: 28.48 KG/M2 | WEIGHT: 177.2 LBS

## 2023-11-29 DIAGNOSIS — Z30.09 FAMILY PLANNING: ICD-10-CM

## 2023-11-29 DIAGNOSIS — E28.2 PCOS (POLYCYSTIC OVARIAN SYNDROME): ICD-10-CM

## 2023-11-29 DIAGNOSIS — Z01.419 WOMEN'S ANNUAL ROUTINE GYNECOLOGICAL EXAMINATION: Primary | ICD-10-CM

## 2023-11-29 RX ORDER — LETROZOLE 2.5 MG/1
TABLET, FILM COATED ORAL
Qty: 5 TABLET | Refills: 2 | Status: SHIPPED | OUTPATIENT
Start: 2023-11-29

## 2023-11-29 NOTE — PROGRESS NOTES
Mirena 2019     Gynecologic Annual Exam Note        Gynecologic Exam        Subjective     HPI  Ely De La Garza is a 38 y.o.  female who presents for annual well woman exam as a established patient. There were no changes to her medical or surgical history since her last visit.. Patient reports problems with:  PCOS and trying to conceive . Patient's last menstrual period was 2023 (exact date).. Her periods occur every 25-35 days , lasting 6 days. The flow is normal .. She reports dysmenorrhea is moderate, occurring premenstrually and first 1-2 days of flow. Partner Status: Marital Status: .  She is sexually active. She has not had new partners.. STD testing recommendations have been explained to the patient and she does not desire STD testing.    Additional OB/GYN History   Current contraception: none  Desires to: not start contraception   Thromboembolic Disease: none  Age of menarche: 10    History of STD: no    Last Pap : 2022. Results: negative. HPV: not done.   Last Completed Pap Smear            PAP SMEAR (Every 3 Years) Next due on 2022  Pap IG, Rfx HPV ASCU    2021  Pap IG, HPV-hr    2019  Done - Negative                     History of abnormal Pap smear: no  Gardasil status: has not had  Family history of uterine, colon, breast, or ovarian cancer: yes - MGM-Uterine and Ovarian Cancer   Performs monthly Self-Breast Exam: yes  Exercises Regularly:yes  Feelings of Anxiety or Depression: yes - on medication and sees counseling   Tobacco Usage?: No       Current Outpatient Medications:     buPROPion XL (WELLBUTRIN XL) 300 MG 24 hr tablet, , Disp: , Rfl:     cholecalciferol (VITAMIN D3) 25 MCG (1000 UT) tablet, Take 1 tablet by mouth Daily., Disp: , Rfl:     Iron, Ferrous Sulfate, 325 (65 Fe) MG tablet, Take 1 tablet by mouth Daily. Take with vitamin C 500 mg daily, Disp: , Rfl:     Linzess 145 MCG capsule capsule, TAKE 1 CAPSULE BY MOUTH ONCE  DAILY IN THE MORNING BEFORE BREAKFAST, Disp: 90 capsule, Rfl: 0    midodrine (PROAMATINE) 2.5 MG tablet, Take 1 tablet by mouth 3 (Three) Times a Day Before Meals., Disp: 90 tablet, Rfl: 3    ondansetron (ZOFRAN) 4 MG tablet, Every 8 (Eight) Hours., Disp: , Rfl:     pantoprazole (PROTONIX) 40 MG EC tablet, Take 1 tablet by mouth 2 (Two) Times a Day., Disp: 180 tablet, Rfl: 3    PARoxetine (PAXIL) 30 MG tablet, Take 1 tablet by mouth Daily., Disp: , Rfl:     prazosin (MINIPRESS) 1 MG capsule, Take 1 capsule by mouth every night at bedtime., Disp: , Rfl:     promethazine (PHENERGAN) 25 MG tablet, 1/2 to 1 tablet orally every 6 hours as needed for nausea, caution sedation, Disp: 15 tablet, Rfl: 0    SUMAtriptan (Imitrex) 100 MG tablet, Take 1/2- one tablet at onset of headache. May repeat dose one time in 2 hours if headache not relieved., Disp: 9 tablet, Rfl: 2    topiramate (TOPAMAX) 100 MG tablet, Take 1 tablet by mouth twice daily, Disp: 30 tablet, Rfl: 0    topiramate (TOPAMAX) 50 MG tablet, TAKE 1 TABLET BY MOUTH ONCE DAILY AT NIGHT, Disp: 90 tablet, Rfl: 0    traZODone (DESYREL) 100 MG tablet, Take 1 tablet by mouth every night at bedtime., Disp: , Rfl:     vitamin C (ASCORBIC ACID) 500 MG tablet, , Disp: , Rfl:     Vraylar 1.5 MG capsule capsule, Take 1 capsule by mouth Daily., Disp: , Rfl:      Patient denies the need for medication refills today.    OB History          1    Para   1    Term   1            AB        Living   1         SAB        IAB        Ectopic        Molar        Multiple        Live Births   1                Health Maintenance   Topic Date Due    HEPATITIS C SCREENING  Never done    Annual Gynecologic Pelvic and Breast Exam  2023    INFLUENZA VACCINE  2023    COVID-19 Vaccine (3 - 2023-24 season) 2023    LIPID PANEL  2024    BMI FOLLOWUP  2024    ANNUAL PHYSICAL  2024    PAP SMEAR  2025    TDAP/TD VACCINES (2 - Td or Tdap) 2032     Pneumococcal Vaccine 0-64  Aged Out       Past Medical History:   Diagnosis Date    Abnormal findings on diagnostic imaging of breast     Contact with and (suspected) exposure to other viral communicable diseases     Cutaneous abscess of back (any part, except buttock)     Depression     Diarrhea     Encounter for insertion of mirena IUD 2019    Due for removal 2025    Encounter for observation for suspected exposure to other biological agents ruled out     Esophageal reflux     Female infertility     Fever     Flu     Generalized anxiety disorder     GERD (gastroesophageal reflux disease)     Gestational hypertension     Goiter     Pt states resolved    Headache     Herpes zoster     History of COVID-19     Lymphedema     Migraine without aura, not intractable, without status migrainosus     Mixed hyperlipidemia     Morbid obesity     Nausea and vomiting     Obese     Other injury of unspecified body region, initial encounter     Panic disorder     Paresthesia of skin     Polycystic ovaries     Polycystic ovary syndrome     PONV (postoperative nausea and vomiting)     Prediabetes     Sacrococcygeal disorders, not elsewhere classified     Subacute cough     Trochanteric bursitis of left hip     Type 2 diabetes mellitus     Urinary tract infection     Viral infection     Wears contact lenses     Wears eyeglasses         Past Surgical History:   Procedure Laterality Date    BODY LIFT N/A 2017    Procedure: CIRCUMFERENTIAL BODY LIFT;  Surgeon: Enrique Leroy MD;  Location:  [facility];  Service:     BODY LIFT Bilateral 2019    Procedure: BILATERAL THIGH  LIFT WITH LIPOSUCTION;  Surgeon: Enrique Leroy MD;  Location:  [facility];  Service: Plastics    BREAST AUGMENTATION       SECTION      GASTRIC SLEEVE LAPAROSCOPIC  08/10/2020    INTRAUTERINE DEVICE INSERTION  2019    Mirena; due for removal 2025    LIPOMA EXCISION      LIPOSUCTION ABDOMINAL N/A 2019     "Procedure: LIPOSUCTION ABDOMINAL;  Surgeon: Enrique Leroy MD;  Location: [facility];  Service: Plastics    LUNG BIOPSY      OTHER SURGICAL HISTORY      Back Lift    TONSILLECTOMY      WISDOM TOOTH EXTRACTION         The additional following portions of the patient's history were reviewed and updated as appropriate: allergies, current medications, past family history, past medical history, past social history, past surgical history, and problem list.    Review of Systems   Genitourinary:  Positive for menstrual problem (pcos).   All other systems reviewed and are negative.        I have reviewed and agree with the HPI, ROS, and historical information as entered above. Huong Romelia Dwyer, APRN          Objective   /78   Ht 167.6 cm (65.98\")   Wt 80.4 kg (177 lb 3.2 oz)   LMP 11/14/2023 (Exact Date)   BMI 28.61 kg/m²     Physical Exam  Vitals and nursing note reviewed. Exam conducted with a chaperone present.   Constitutional:       General: She is not in acute distress.     Appearance: Normal appearance. She is well-developed. She is not ill-appearing.   Neck:      Thyroid: No thyroid mass or thyromegaly.   Pulmonary:      Effort: Pulmonary effort is normal. No respiratory distress or retractions.   Chest:      Chest wall: No mass.   Breasts:     Right: Normal. No mass, nipple discharge, skin change or tenderness.      Left: Normal. No mass, nipple discharge, skin change or tenderness.   Abdominal:      General: There is no distension.      Palpations: Abdomen is soft. Abdomen is not rigid. There is no mass.      Tenderness: There is no abdominal tenderness. There is no guarding or rebound.      Hernia: No hernia is present. There is no hernia in the left inguinal area.   Genitourinary:     General: Normal vulva.      Labia:         Right: No rash, tenderness or lesion.         Left: No rash, tenderness or lesion.       Vagina: Normal. No vaginal discharge or lesions.      Cervix: Normal.      Uterus: Normal. " Not enlarged, not fixed and not tender.       Adnexa: Right adnexa normal and left adnexa normal.        Right: No mass or tenderness.          Left: No mass or tenderness.        Rectum: No external hemorrhoid.   Musculoskeletal:      Cervical back: No muscular tenderness.   Skin:     General: Skin is warm and dry.   Neurological:      Mental Status: She is alert and oriented to person, place, and time.   Psychiatric:         Mood and Affect: Mood normal.         Behavior: Behavior normal.            Assessment and Plan    Problem List Items Addressed This Visit    None  Visit Diagnoses       Women's annual routine gynecological examination    -  Primary    PCOS (polycystic ovarian syndrome)        Family planning                GYN annual well woman exam.   Reviewed pap guidelines.   Reviewed monthly self breast exams.  Instructed to call with lumps, pain, or breast discharge.    Reviewed exercise as a preventative health measures.   Reccommended Flu Vaccine in Fall of each year.  RTC in 1 year or PRN with problems  Return in about 1 year (around 11/29/2024), or if symptoms worsen or fail to improve, for Annual physical.  8.   US wnl today.  Rev risks, benefits, side effects, correct use of Femara.  Daily PNV and timed intercourse and when to call also rev.  Day 21 progesterone level encouraged.  May need specialist referral if unsuccessful after 3 months of Femara.    Huong Dwyer, APRN  11/29/2023

## 2023-12-19 ENCOUNTER — OFFICE VISIT (OUTPATIENT)
Dept: FAMILY MEDICINE CLINIC | Facility: CLINIC | Age: 38
End: 2023-12-19
Payer: COMMERCIAL

## 2023-12-19 VITALS
TEMPERATURE: 98.4 F | SYSTOLIC BLOOD PRESSURE: 122 MMHG | DIASTOLIC BLOOD PRESSURE: 80 MMHG | WEIGHT: 175.2 LBS | BODY MASS INDEX: 28.16 KG/M2 | HEIGHT: 66 IN | HEART RATE: 83 BPM | OXYGEN SATURATION: 99 %

## 2023-12-19 DIAGNOSIS — J02.9 SORE THROAT: ICD-10-CM

## 2023-12-19 DIAGNOSIS — R31.9 HEMATURIA, UNSPECIFIED TYPE: Primary | ICD-10-CM

## 2023-12-19 DIAGNOSIS — B34.9 VIRAL SYNDROME: ICD-10-CM

## 2023-12-19 DIAGNOSIS — G43.109 MIGRAINE WITH AURA AND WITHOUT STATUS MIGRAINOSUS, NOT INTRACTABLE: ICD-10-CM

## 2023-12-19 DIAGNOSIS — R31.0 GROSS HEMATURIA: ICD-10-CM

## 2023-12-19 LAB
B-HCG UR QL: NEGATIVE
BILIRUB BLD-MCNC: NEGATIVE MG/DL
CLARITY, POC: CLEAR
COLOR UR: YELLOW
EXPIRATION DATE: ABNORMAL
EXPIRATION DATE: NORMAL
EXPIRATION DATE: NORMAL
FLUAV AG UPPER RESP QL IA.RAPID: NOT DETECTED
FLUBV AG UPPER RESP QL IA.RAPID: NOT DETECTED
GLUCOSE UR STRIP-MCNC: NEGATIVE MG/DL
INTERNAL CONTROL: NORMAL
INTERNAL NEGATIVE CONTROL: NORMAL
INTERNAL POSITIVE CONTROL: NORMAL
KETONES UR QL: ABNORMAL
LEUKOCYTE EST, POC: ABNORMAL
Lab: ABNORMAL
Lab: NORMAL
Lab: NORMAL
NITRITE UR-MCNC: NEGATIVE MG/ML
PH UR: 7.5 [PH] (ref 5–8)
PROT UR STRIP-MCNC: NEGATIVE MG/DL
RBC # UR STRIP: NEGATIVE /UL
SARS-COV-2 AG UPPER RESP QL IA.RAPID: NOT DETECTED
SP GR UR: 1.01 (ref 1–1.03)
UROBILINOGEN UR QL: ABNORMAL

## 2023-12-19 PROCEDURE — 81003 URINALYSIS AUTO W/O SCOPE: CPT | Performed by: NURSE PRACTITIONER

## 2023-12-19 PROCEDURE — 81025 URINE PREGNANCY TEST: CPT | Performed by: NURSE PRACTITIONER

## 2023-12-19 PROCEDURE — 99213 OFFICE O/P EST LOW 20 MIN: CPT | Performed by: NURSE PRACTITIONER

## 2023-12-19 PROCEDURE — 87428 SARSCOV & INF VIR A&B AG IA: CPT | Performed by: NURSE PRACTITIONER

## 2023-12-19 RX ORDER — LINACLOTIDE 145 UG/1
145 CAPSULE, GELATIN COATED ORAL
Qty: 90 CAPSULE | Refills: 0 | Status: SHIPPED | OUTPATIENT
Start: 2023-12-19

## 2023-12-19 RX ORDER — LORATADINE 10 MG/1
10 TABLET ORAL DAILY
Qty: 30 TABLET | Refills: 1 | Status: SHIPPED | OUTPATIENT
Start: 2023-12-19

## 2023-12-19 RX ORDER — TOPIRAMATE 100 MG/1
100 TABLET, FILM COATED ORAL 2 TIMES DAILY
Qty: 60 TABLET | Refills: 1 | Status: SHIPPED | OUTPATIENT
Start: 2023-12-19

## 2023-12-19 RX ORDER — FLUTICASONE PROPIONATE 50 MCG
2 SPRAY, SUSPENSION (ML) NASAL DAILY
Qty: 11.1 ML | Refills: 0 | Status: SHIPPED | OUTPATIENT
Start: 2023-12-19

## 2023-12-19 NOTE — PROGRESS NOTES
Office Note     Name: Ely De La Garza    : 1985     MRN: [id number]     Chief Complaint  Blood in Urine (Has passed little clots), Sinus Problem, and Sore Throat    Subjective     History of Present Illness:  Ely De La Garza is a 38 y.o. female who presents today for for complaints of pink urine with red streaks and painful urination that began upon awakening yesterday morning and proceeded all day yesterday.  States she had extreme, suprapubic and low back pain that caused her to double over several times during the day yesterday.  She had a similar episode approximately 6 to 9 months ago at which time her urinalysis was not indicative that she was suffering from UTI, questionable episode of nephrolithiasis, though unconfirmed with any diagnostic imaging.  She has not had any repeat issues until yesterday. Today urine issues are resolved.  No has acute complaints of sinus drainage, sore throat and ear pain that began this morning.  No associated fever, sinus pressure or pain or headache.  She has not taken any medications for any of her symptoms.  She has no further complaints or concerns  Review of Systems   Constitutional:  Negative for fatigue.   HENT:  Positive for postnasal drip, sinus pressure and sore throat.    Respiratory:  Negative for cough, chest tightness and wheezing.    Cardiovascular:  Negative for chest pain, palpitations and leg swelling.   Gastrointestinal:  Negative for abdominal pain, constipation, diarrhea, nausea and vomiting.   Genitourinary:  Positive for dysuria, flank pain and hematuria. Negative for pelvic pressure.   Musculoskeletal:  Negative for arthralgias and myalgias.   Neurological:  Negative for weakness, light-headedness and headache.       Objective     Past Medical History:   Diagnosis Date    Abnormal findings on diagnostic imaging of breast     Contact with and (suspected) exposure to other viral communicable diseases     Cutaneous abscess of back (any  part, except buttock)     Depression     Diarrhea     Encounter for insertion of mirena IUD 2019    Due for removal 2025    Encounter for observation for suspected exposure to other biological agents ruled out     Esophageal reflux     Female infertility     Fever     Flu     Generalized anxiety disorder     GERD (gastroesophageal reflux disease)     Gestational hypertension     Goiter     Pt states resolved    Headache     Herpes zoster     History of COVID-19     Lymphedema     Migraine without aura, not intractable, without status migrainosus     Mixed hyperlipidemia     Morbid obesity     Nausea and vomiting     Obese     Other injury of unspecified body region, initial encounter     Panic disorder     Paresthesia of skin     Polycystic ovaries     Polycystic ovary syndrome     PONV (postoperative nausea and vomiting)     Prediabetes     Sacrococcygeal disorders, not elsewhere classified     Subacute cough     Trochanteric bursitis of left hip     Type 2 diabetes mellitus     Urinary tract infection     Viral infection     Wears contact lenses     Wears eyeglasses      Past Surgical History:   Procedure Laterality Date    BODY LIFT N/A 2017    Procedure: CIRCUMFERENTIAL BODY LIFT;  Surgeon: Enrique Leroy MD;  Location:  [facility];  Service:     BODY LIFT Bilateral 2019    Procedure: BILATERAL THIGH  LIFT WITH LIPOSUCTION;  Surgeon: Enrique Leroy MD;  Location:  [facility];  Service: Plastics    BREAST AUGMENTATION       SECTION      GASTRIC SLEEVE LAPAROSCOPIC  08/10/2020    INTRAUTERINE DEVICE INSERTION  2019    Mirena; due for removal 2025    LIPOMA EXCISION      LIPOSUCTION ABDOMINAL N/A 2019    Procedure: LIPOSUCTION ABDOMINAL;  Surgeon: Enrique Leroy MD;  Location:  [facility];  Service: Plastics    LUNG BIOPSY      OTHER SURGICAL HISTORY      Back Lift    TONSILLECTOMY      WISDOM TOOTH EXTRACTION       Family History   Problem Relation Age of  "Onset    Heart disease Mother     Heart disease Father     Diabetes Father     Cancer Father     Heart disease Maternal Grandmother     Thyroid disease Maternal Grandmother     Cervical cancer Maternal Grandmother     Uterine cancer Maternal Grandmother     Hypertension Other     Obesity Other     Hypertension Other     Breast cancer Other         2 SISTERS OF MATERNAL GRANDFATHER       Vital Signs  /80 (BP Location: Left arm, Patient Position: Sitting, Cuff Size: Adult)   Pulse 83   Temp 98.4 °F (36.9 °C) (Temporal)   Ht 167.6 cm (66\")   Wt 79.5 kg (175 lb 3.2 oz)   SpO2 99%   BMI 28.28 kg/m²   Estimated body mass index is 28.28 kg/m² as calculated from the following:    Height as of this encounter: 167.6 cm (66\").    Weight as of this encounter: 79.5 kg (175 lb 3.2 oz).    Physical Exam  Vitals reviewed.   Constitutional:       Appearance: Normal appearance.   HENT:      Head: Normocephalic and atraumatic.      Right Ear: Tympanic membrane, ear canal and external ear normal.      Left Ear: Tympanic membrane, ear canal and external ear normal.      Nose: Nose normal.      Mouth/Throat:      Pharynx: Oropharynx is clear.   Eyes:      Conjunctiva/sclera: Conjunctivae normal.   Cardiovascular:      Rate and Rhythm: Normal rate and regular rhythm.      Pulses: Normal pulses.      Heart sounds: Normal heart sounds.   Pulmonary:      Effort: Pulmonary effort is normal.      Breath sounds: Normal breath sounds.   Abdominal:      General: Bowel sounds are normal. There is no distension.      Palpations: Abdomen is soft. There is no mass.      Tenderness: There is no abdominal tenderness. There is no right CVA tenderness or left CVA tenderness.   Musculoskeletal:         General: Normal range of motion.      Cervical back: Neck supple.   Skin:     General: Skin is warm and dry.      Capillary Refill: Capillary refill takes less than 2 seconds.   Neurological:      Mental Status: She is alert and oriented to " person, place, and time.             POCT Results (if applicable):  Results for orders placed or performed in visit on 12/19/23   POCT urinalysis dipstick, automated    Specimen: Urine   Result Value Ref Range    Color Yellow Yellow, Straw, Dark Yellow, Kailyn    Clarity, UA Clear Clear    Specific Gravity  1.010 1.005 - 1.030    pH, Urine 7.5 5.0 - 8.0    Leukocytes Small (1+) (A) Negative    Nitrite, UA Negative Negative    Protein, POC Negative Negative mg/dL    Glucose, UA Negative Negative mg/dL    Ketones, UA 2+ (A) Negative    Urobilinogen, UA 0.2 E.U./dL Normal, 0.2 E.U./dL    Bilirubin Negative Negative    Blood, UA Negative Negative    Lot Number [id number]     Expiration Date 03/25/2024    Covid-19 + Flu A&B AG, Veritor    Specimen: Swab   Result Value Ref Range    SARS Antigen Not Detected Not Detected, Presumptive Negative    Influenza A Antigen DEVAN Not Detected Not Detected    Influenza B Antigen DEVAN Not Detected Not Detected    Internal Control Passed Passed    Lot Number [id number]     Expiration Date 11,032,024    POC Pregnancy, Urine    Specimen: Urine   Result Value Ref Range    HCG, Urine, QL Negative Negative    Lot Number [id number]     Internal Positive Control Passed Positive, Passed    Internal Negative Control Passed Negative, Passed    Expiration Date 1,032,025             Assessment and Plan     Diagnoses and all orders for this visit:    1. Hematuria, unspecified type (Primary)  -     POCT urinalysis dipstick, automated  -     POC Pregnancy, Urine    2. Sore throat  -     Covid-19 + Flu A&B AG, Veritor    3. Gross hematuria  Assessment & Plan:  presents today for for complaints of pink urine with red streaks and painful urination that began upon awakening yesterday morning and proceeded all day yesterday.  States she had extreme, suprapubic and low back pain that caused her to double over several times during the day yesterday.  She had a similar episode approximately 6 to 9 months ago at  which time her urinalysis was not indicative that she was suffering from UTI, questionable episode of nephrolithiasis, though unconfirmed with any diagnostic imaging.  She has not had any repeat issues until yesterday. Today urine issues are resolved.  Urinalysis obtained in office today negative for nitrites, small amount of leukocytes and negative for blood.  Questionable if she is having intermittent episodes of nephrolithiasis.  If hematuria returns would warrant possible further workup with urology.       4. Viral syndrome  Assessment & Plan:  acute complaints of sinus drainage, sore throat and ear pain that began this morning.  No associated fever, sinus pressure or pain or headache.  She has not taken any medications for any of her symptoms.  Patient has been exposed to COVID by her grandfather who is now living in their home, receiving hospice care.  She has tested negative for COVID and flu in office today.  Given recent onset of symptoms could potentially be false negative.  She is advised if symptoms progress and persist to retest in 48 hours.  In the meantime symptom management with over-the-counter cold and cough medications of choice, coolmist humidifier, plenty of rest and fluids.      Other orders  -     loratadine (Claritin) 10 MG tablet; Take 1 tablet by mouth Daily.  Dispense: 30 tablet; Refill: 1  -     fluticasone (FLONASE) 50 MCG/ACT nasal spray; 2 sprays into the nostril(s) as directed by provider Daily.  Dispense: 11.1 mL; Refill: 0               Follow Up  No follow-ups on file.    Magali Hernández, CHUCK

## 2023-12-20 PROBLEM — R31.0 GROSS HEMATURIA: Status: ACTIVE | Noted: 2023-12-20

## 2023-12-20 PROBLEM — B34.9 VIRAL SYNDROME: Status: ACTIVE | Noted: 2023-12-20

## 2023-12-20 NOTE — ASSESSMENT & PLAN NOTE
presents today for for complaints of pink urine with red streaks and painful urination that began upon awakening yesterday morning and proceeded all day yesterday.  States she had extreme, suprapubic and low back pain that caused her to double over several times during the day yesterday.  She had a similar episode approximately 6 to 9 months ago at which time her urinalysis was not indicative that she was suffering from UTI, questionable episode of nephrolithiasis, though unconfirmed with any diagnostic imaging.  She has not had any repeat issues until yesterday. Today urine issues are resolved.  Urinalysis obtained in office today negative for nitrites, small amount of leukocytes and negative for blood.  Questionable if she is having intermittent episodes of nephrolithiasis.  If hematuria returns would warrant possible further workup with urology.

## 2023-12-20 NOTE — ASSESSMENT & PLAN NOTE
acute complaints of sinus drainage, sore throat and ear pain that began this morning.  No associated fever, sinus pressure or pain or headache.  She has not taken any medications for any of her symptoms.  Patient has been exposed to COVID by her grandfather who is now living in their home, receiving hospice care.  She has tested negative for COVID and flu in office today.  Given recent onset of symptoms could potentially be false negative.  She is advised if symptoms progress and persist to retest in 48 hours.  In the meantime symptom management with over-the-counter cold and cough medications of choice, coolmist humidifier, plenty of rest and fluids.

## 2023-12-21 ENCOUNTER — TELEPHONE (OUTPATIENT)
Dept: FAMILY MEDICINE CLINIC | Facility: CLINIC | Age: 38
End: 2023-12-21
Payer: COMMERCIAL

## 2023-12-21 RX ORDER — BROMPHENIRAMINE MALEATE, PSEUDOEPHEDRINE HYDROCHLORIDE, AND DEXTROMETHORPHAN HYDROBROMIDE 2; 30; 10 MG/5ML; MG/5ML; MG/5ML
10 SYRUP ORAL 4 TIMES DAILY PRN
Qty: 200 ML | Refills: 0 | Status: SHIPPED | OUTPATIENT
Start: 2023-12-21

## 2023-12-21 NOTE — TELEPHONE ENCOUNTER
Caller: Ely De La Garza    Relationship: Self    Best call back number: [phone]     What medication are you requesting: COUGH SYRUP    What are your current symptoms: CHEST CONGESTION AND COUGH    How long have you been experiencing symptoms: NA    Have you had these symptoms before:    [] Yes  [] No    Have you been treated for these symptoms before:   [] Yes  [] No    If a prescription is needed, what is your preferred pharmacy and phone number: [address] [phone] [facility] [phone]      Additional notes: PATIENT STATED SHE WAS SEEN BY BRITTNEE LI ON 12.19.  STATED HER SYMPTOMS HAS NOT MOVED DOWN INTO HER CHEST AND HAS A COUGH.

## 2024-01-02 ENCOUNTER — LAB (OUTPATIENT)
Dept: OBSTETRICS AND GYNECOLOGY | Facility: CLINIC | Age: 39
End: 2024-01-02
Payer: COMMERCIAL

## 2024-01-02 DIAGNOSIS — Z30.09 FAMILY PLANNING: Primary | ICD-10-CM

## 2024-01-03 LAB — PROGEST SERPL-MCNC: 55.9 NG/ML

## 2024-02-07 ENCOUNTER — INITIAL PRENATAL (OUTPATIENT)
Dept: OBSTETRICS AND GYNECOLOGY | Facility: CLINIC | Age: 39
End: 2024-02-07
Payer: COMMERCIAL

## 2024-02-07 VITALS — DIASTOLIC BLOOD PRESSURE: 70 MMHG | WEIGHT: 182 LBS | BODY MASS INDEX: 29.38 KG/M2 | SYSTOLIC BLOOD PRESSURE: 110 MMHG

## 2024-02-07 DIAGNOSIS — Z87.51 HISTORY OF PRETERM DELIVERY: ICD-10-CM

## 2024-02-07 DIAGNOSIS — Z98.891 PREVIOUS CESAREAN SECTION: ICD-10-CM

## 2024-02-07 DIAGNOSIS — Z3A.08 8 WEEKS GESTATION OF PREGNANCY: ICD-10-CM

## 2024-02-07 DIAGNOSIS — O09.529 ANTEPARTUM MULTIGRAVIDA OF ADVANCED MATERNAL AGE: ICD-10-CM

## 2024-02-07 DIAGNOSIS — Z90.3 HISTORY OF SLEEVE GASTRECTOMY: Primary | ICD-10-CM

## 2024-02-07 DIAGNOSIS — F33.42 RECURRENT MAJOR DEPRESSIVE DISORDER, IN FULL REMISSION: ICD-10-CM

## 2024-02-07 PROBLEM — Z41.1 ENCOUNTER FOR COSMETIC SURGERY: Status: RESOLVED | Noted: 2019-02-14 | Resolved: 2024-02-07

## 2024-02-07 PROBLEM — I95.1 ORTHOSTASIS: Status: RESOLVED | Noted: 2023-09-11 | Resolved: 2024-02-07

## 2024-02-07 PROBLEM — E04.9 GOITER: Status: RESOLVED | Noted: 2022-09-12 | Resolved: 2024-02-07

## 2024-02-07 PROBLEM — R31.0 GROSS HEMATURIA: Status: RESOLVED | Noted: 2023-12-20 | Resolved: 2024-02-07

## 2024-02-07 PROBLEM — R11.2 NAUSEA AND VOMITING: Status: RESOLVED | Noted: 2023-09-11 | Resolved: 2024-02-07

## 2024-02-07 PROBLEM — E86.9 VOLUME DEPLETION: Status: RESOLVED | Noted: 2023-09-11 | Resolved: 2024-02-07

## 2024-02-07 PROBLEM — G47.00 INSOMNIA: Status: RESOLVED | Noted: 2023-06-08 | Resolved: 2024-02-07

## 2024-02-07 PROBLEM — N30.01 ACUTE CYSTITIS WITH HEMATURIA: Status: RESOLVED | Noted: 2023-04-13 | Resolved: 2024-02-07

## 2024-02-07 PROBLEM — D62 ANEMIA DUE TO ACUTE BLOOD LOSS: Status: RESOLVED | Noted: 2023-09-11 | Resolved: 2024-02-07

## 2024-02-07 PROBLEM — E66.9 OBESITY (BMI 35.0-39.9 WITHOUT COMORBIDITY): Status: RESOLVED | Noted: 2022-09-16 | Resolved: 2024-02-07

## 2024-02-07 PROBLEM — Z41.1 ENCOUNTER FOR SURGERY FOR COSMETIC DEFORMITY: Status: RESOLVED | Noted: 2017-01-26 | Resolved: 2024-02-07

## 2024-02-07 PROBLEM — R55 SYNCOPE: Status: RESOLVED | Noted: 2023-09-22 | Resolved: 2024-02-07

## 2024-02-07 PROBLEM — R42 DIZZINESS ON STANDING: Status: RESOLVED | Noted: 2023-09-11 | Resolved: 2024-02-07

## 2024-02-07 PROBLEM — G90.A POTS (POSTURAL ORTHOSTATIC TACHYCARDIA SYNDROME): Status: RESOLVED | Noted: 2023-09-22 | Resolved: 2024-02-07

## 2024-02-07 PROBLEM — R92.0 MICROCALCIFICATION OF RIGHT BREAST ON MAMMOGRAM: Status: RESOLVED | Noted: 2023-06-02 | Resolved: 2024-02-07

## 2024-02-07 PROBLEM — E66.3 OVERWEIGHT (BMI 25.0-29.9): Status: RESOLVED | Noted: 2023-06-02 | Resolved: 2024-02-07

## 2024-02-07 PROBLEM — B34.9 VIRAL SYNDROME: Status: RESOLVED | Noted: 2023-12-20 | Resolved: 2024-02-07

## 2024-02-07 PROBLEM — F41.9 ANXIETY: Status: RESOLVED | Noted: 2022-12-08 | Resolved: 2024-02-07

## 2024-02-07 PROBLEM — Z98.82 HISTORY OF BILATERAL BREAST IMPLANTS: Status: RESOLVED | Noted: 2023-06-02 | Resolved: 2024-02-07

## 2024-02-07 PROBLEM — Z34.90 PREGNANCY: Status: ACTIVE | Noted: 2024-02-07

## 2024-02-07 PROBLEM — Z86.16 HISTORY OF COVID-19: Status: RESOLVED | Noted: 2022-09-12 | Resolved: 2024-02-07

## 2024-02-07 PROBLEM — G43.109 MIGRAINE WITH AURA AND WITHOUT STATUS MIGRAINOSUS, NOT INTRACTABLE: Status: RESOLVED | Noted: 2022-12-08 | Resolved: 2024-02-07

## 2024-02-07 PROBLEM — E66.9 OBESE: Status: RESOLVED | Noted: 2022-09-12 | Resolved: 2024-02-07

## 2024-02-07 PROBLEM — K59.00 CONSTIPATION: Status: RESOLVED | Noted: 2023-06-08 | Resolved: 2024-02-07

## 2024-02-07 PROBLEM — T14.8XXA BRUISING: Status: RESOLVED | Noted: 2023-06-02 | Resolved: 2024-02-07

## 2024-02-07 PROBLEM — K21.9 GERD (GASTROESOPHAGEAL REFLUX DISEASE): Status: RESOLVED | Noted: 2022-09-12 | Resolved: 2024-02-07

## 2024-02-07 PROBLEM — I89.0 LYMPHEDEMA: Status: RESOLVED | Noted: 2022-09-12 | Resolved: 2024-02-07

## 2024-02-07 PROBLEM — R00.2 PALPITATIONS: Status: RESOLVED | Noted: 2023-09-28 | Resolved: 2024-02-07

## 2024-02-07 RX ORDER — FOLIC ACID 1 MG/1
1 TABLET ORAL DAILY
COMMUNITY

## 2024-02-07 NOTE — PROGRESS NOTES
Initial ob visit     CC- Here for care of pregnancy        Ely De La Garza is a 38 y.o. female, , who presents for her first obstetrical visit.  Patient's last menstrual period was 2023.. Her KYLE is 2024, by Last Menstrual Period. Current GA is 8w2d.     Initial positive test date : 1-3-2024, UPT        Her periods are every regular, q 4 weeks.  Prior obstetric issues: CHTN prior to previous pregnancy, c/s for fetal intolerance after PPROM at 29weeks.  Patient was hospitalized for incompetent cervix and bulging bag, received steroids and then PPROM.    Patient's past medical history is significant for: h/o gastric bypass She has not been treated for CHTN >8years.  Patient is managed by [facility] for depression  Family history of genetic issues (includes FOB): denies   Prior infections concerning in pregnancy (Rash, fever in last 2 weeks): No  Varicella Hx - history of chicken pox  Prior testing for Cystic Fibrosis Carrier or Sickle Cell Trait- no  History of STD: no  Hx of HSV for patient or partner: no  Ultrasound Today: yes    OB History    Para Term  AB Living   2 1 0 1   1   SAB IAB Ectopic Molar Multiple Live Births             1      # Outcome Date GA Lbr Clem/2nd Weight Sex Delivery Anes PTL Lv   2 Current            1  09 29w0d  1531 g (3 lb 6 oz) M CS-LTranv   JOSE FRANCISCO       Additional Pertinent History   Last Pap :  Result: negative HPV: negative in      Last Completed Pap Smear            PAP SMEAR (Every 3 Years) Next due on 2022  Pap IG, Rfx HPV ASCU    2021  Pap IG, HPV-hr    2019  Done - Negative                  History of abnormal Pap smear: no  Family history of uterine, colon, breast, or ovarian cancer: yes - MGM  and 2 maternal great aunts  Feelings of Anxiety or Depression: yes - managed by [facility]   Tobacco Usage?: No   Alcohol/Drug Use?: NO  Over the age of 35 at delivery: yes  Genetic Screening:  desires options discussed  Flu Status:  may desire    PMH    Current Outpatient Medications:     buPROPion XL (WELLBUTRIN XL) 300 MG 24 hr tablet, , Disp: , Rfl:     CHOLINE PO, Take  by mouth., Disp: , Rfl:     folic acid (FOLVITE) 1 MG tablet, Take 1 tablet by mouth Daily., Disp: , Rfl:     linaclotide (Linzess) 145 MCG capsule capsule, Take 1 capsule by mouth Every Morning Before Breakfast., Disp: 90 capsule, Rfl: 1    pantoprazole (PROTONIX) 40 MG EC tablet, Take 1 tablet by mouth 2 (Two) Times a Day., Disp: 180 tablet, Rfl: 3    promethazine (PHENERGAN) 25 MG tablet, 1/2 to 1 tablet orally every 6 hours as needed for nausea, caution sedation, Disp: 15 tablet, Rfl: 0    Vraylar 1.5 MG capsule capsule, Take 1 capsule by mouth Daily., Disp: , Rfl:      Past Medical History:   Diagnosis Date    Abnormal findings on diagnostic imaging of breast     Contact with and (suspected) exposure to other viral communicable diseases     Cutaneous abscess of back (any part, except buttock)     Depression     Diarrhea     Encounter for insertion of mirena IUD 12/30/2019    Due for removal 12/30/2025    Encounter for observation for suspected exposure to other biological agents ruled out     Esophageal reflux     Female infertility     Fever     Flu     Generalized anxiety disorder     GERD (gastroesophageal reflux disease)     Gestational hypertension     Goiter     Pt states resolved    Headache     Herpes zoster 2003    History of COVID-19     Lymphedema     Migraine without aura, not intractable, without status migrainosus     Mixed hyperlipidemia     Morbid obesity     Nausea and vomiting     Obese     Other injury of unspecified body region, initial encounter     Panic disorder     Paresthesia of skin     Polycystic ovaries     Polycystic ovary syndrome     PONV (postoperative nausea and vomiting)     Prediabetes     Pregnancy 2/7/2024    Sacrococcygeal disorders, not elsewhere classified     Subacute cough     Trochanteric  bursitis of left hip     Type 2 diabetes mellitus     Urinary tract infection     Viral infection     Viral syndrome 2023    Wears contact lenses     Wears eyeglasses         Past Surgical History:   Procedure Laterality Date    ABDOMINOPLASTY      BODY LIFT N/A 2017    Procedure: CIRCUMFERENTIAL BODY LIFT;  Surgeon: Enrique Leroy MD;  Location:  [facility];  Service:     BODY LIFT Bilateral 2019    Procedure: BILATERAL THIGH  LIFT WITH LIPOSUCTION;  Surgeon: Enrique Leroy MD;  Location:  [facility];  Service: Plastics    BREAST AUGMENTATION       SECTION      GASTRIC SLEEVE LAPAROSCOPIC  08/10/2020    INTRAUTERINE DEVICE INSERTION  2019    Mirena; due for removal 2025    LIPOMA EXCISION      LIPOSUCTION ABDOMINAL N/A 2019    Procedure: LIPOSUCTION ABDOMINAL;  Surgeon: Enrique Leroy MD;  Location:  [facility];  Service: Plastics    LUNG BIOPSY      OTHER SURGICAL HISTORY      Back Lift    REPLACEMENT TOTAL HIP LATERAL POSITION Left     ; born with hip dysplasia    TONSILLECTOMY      WISDOM TOOTH EXTRACTION         Review of Systems   Review of Systems      Patient Denies:excessive vomiting and vaginal bleeding  All systems reviewed and otherwise normal.    I have reviewed and agree with the HPI, ROS, and historical information as entered above. Itzel Damon MD      /70   Wt 82.6 kg (182 lb)   LMP 2023   BMI 29.38 kg/m²     The additional following portions of the patient's history were reviewed and updated as appropriate: allergies, current medications, past family history, past medical history, past social history, past surgical history, and problem list.    Physical Exam  General:  well developed; well nourished  no acute distress   Chest/Respiratory: No labored breathing, normal respiratory effort, normal appearance, no respiratory noises noted   Heart:  not examined   Thyroid: not examined   Breasts:  Not performed.   Abdomen: soft, non-tender;  no masses   Pelvis: Not performed.        Assessment and Plan    Problem List Items Addressed This Visit       Depression    Overview     On vraylar, wellbutrin and prozac nob         Relevant Medications    buPROPion XL (WELLBUTRIN XL) 300 MG 24 hr tablet    Vraylar 1.5 MG capsule capsule    Other Relevant Orders    TSH    Obstetric Panel    HIV-1 / O / 2 Ag / Antibody    Urine Culture - Urine, Urine, Clean Catch    Urine Drug Screen - Urine, Clean Catch    Chlamydia trachomatis, Neisseria gonorrhoeae, PCR - Urine, Urine, Random Void    Hemoglobin A1c    AlP+ALT+AST+Creat+LD+TBili+..    Inheritest (R) CF/SMA Panel - Blood,    History of sleeve gastrectomy - Primary    Overview     >350 lb weight loss, history HTN, DM prior to this, normal since  Baby asa         Relevant Orders    [facility] [place]    TSH    Obstetric Panel    HIV-1 / O / 2 Ag / Antibody    Urine Culture - Urine, Urine, Clean Catch    Urine Drug Screen - Urine, Clean Catch    Chlamydia trachomatis, Neisseria gonorrhoeae, PCR - Urine, Urine, Random Void    Hemoglobin A1c    AlP+ALT+AST+Creat+LD+TBili+..    Inheritest (R) CF/SMA Panel - Blood,    Pregnancy    Overview     G1- 29 wk c/s, presented with bulging bag, then pprom. Possible incompetent cervix.          Previous  section    Overview     ?uterine incision, get op report         Relevant Orders    [facility]    Antepartum multigravida of advanced maternal age    Relevant Orders    [facility]    Obstetric Panel    HIV-1 / O / 2 Ag / Antibody    Urine Culture - Urine, Urine, Clean Catch    Urine Drug Screen - Urine, Clean Catch    Chlamydia trachomatis, Neisseria gonorrhoeae, PCR - Urine, Urine, Random Void    Hemoglobin A1c    AlP+ALT+AST+Creat+LD+TBili+..    Inheritest (R) CF/SMA Panel - Blood,    History of  delivery    Relevant Orders    TSH    Obstetric Panel    HIV-1 / O / 2 Ag / Antibody    Urine  Culture - Urine, Urine, Clean Catch    Urine Drug Screen - Urine, Clean Catch    Chlamydia trachomatis, Neisseria gonorrhoeae, PCR - Urine, Urine, Random Void    Hemoglobin A1c    AlP+ALT+AST+Creat+LD+TBili+..    Inheritest (R) CF/SMA Panel - Blood,       Pregnancy at 8w2d  Prev c/s 29 wks, unknown uterine scar, will request records.   Referral to [facility] for possible history incompetent cervix. She was told by prev ob that she might need a cerclage.   Reviewed routine prenatal care with the office and educational materials given  Lab(s) Ordered  Discussed options for genetic testing including first trimester nuchal translucency screen, genetic disease carrier testing, quadruple screen, and NIPT  Patient is on Prenatal vitamins  Activity recommendation : 150 minutes/week of moderate intensity aerobic activity unless we limit for bleeding, hypertension or other pregnancy complication   hgb A1C today  TFT today  discussed baby aspirin from 10-36 weeks for prevention of preeclampsia   baseline PEP today  Return in about 1 month (around 3/7/2024) for F/U Prenatal, Refer - [facility].      Itzel Damon MD  02/07/2024

## 2024-02-08 LAB
ABO GROUP BLD: ABNORMAL
ALP SERPL-CCNC: 43 IU/L (ref 44–121)
ALT SERPL-CCNC: 12 IU/L (ref 0–32)
AST SERPL-CCNC: 12 IU/L (ref 0–40)
BASOPHILS # BLD AUTO: 0 X10E3/UL (ref 0–0.2)
BASOPHILS NFR BLD AUTO: 0 %
BILIRUB SERPL-MCNC: 0.6 MG/DL (ref 0–1.2)
BLD GP AB SCN SERPL QL: NEGATIVE
CITATION REF LAB TEST: NORMAL
CLINICAL GENETICS COUNSELING NOTE: NORMAL
CLINICAL INFO: NORMAL
CREAT SERPL-MCNC: 0.56 MG/DL (ref 0.57–1)
EGFRCR SERPLBLD CKD-EPI 2021: 119 ML/MIN/1.73
EOSINOPHIL # BLD AUTO: 0.1 X10E3/UL (ref 0–0.4)
EOSINOPHIL NFR BLD AUTO: 1 %
ERYTHROCYTE [DISTWIDTH] IN BLOOD BY AUTOMATED COUNT: 13 % (ref 11.7–15.4)
ETHNIC BACKGROUND STATED: NORMAL
GENE DIS ANL CARRIER INTERP-IMP: NORMAL
GENE STUDIED ID: NORMAL
HBA1C MFR BLD: 5.1 % (ref 4.8–5.6)
HBV SURFACE AG SERPL QL IA: NEGATIVE
HCT VFR BLD AUTO: 40 % (ref 34–46.6)
HCV IGG SERPL QL IA: NON REACTIVE
HGB BLD-MCNC: 13.3 G/DL (ref 11.1–15.9)
HIV 1+2 AB+HIV1 P24 AG SERPL QL IA: NON REACTIVE
IMM GRANULOCYTES # BLD AUTO: 0 X10E3/UL (ref 0–0.1)
IMM GRANULOCYTES NFR BLD AUTO: 0 %
LAB DIRECTOR NAME PROVIDER: NORMAL
LDH SERPL L TO P-CCNC: 151 IU/L (ref 119–226)
LYMPHOCYTES # BLD AUTO: 1.3 X10E3/UL (ref 0.7–3.1)
LYMPHOCYTES NFR BLD AUTO: 22 %
Lab: NORMAL
MCH RBC QN AUTO: 31.3 PG (ref 26.6–33)
MCHC RBC AUTO-ENTMCNC: 33.3 G/DL (ref 31.5–35.7)
MCV RBC AUTO: 94 FL (ref 79–97)
MOL DX INTERP BLD/T QL: NORMAL
MONOCYTES # BLD AUTO: 0.3 X10E3/UL (ref 0.1–0.9)
MONOCYTES NFR BLD AUTO: 5 %
NEUTROPHILS # BLD AUTO: 4.3 X10E3/UL (ref 1.4–7)
NEUTROPHILS NFR BLD AUTO: 72 %
PLATELET # BLD AUTO: 189 X10E3/UL (ref 150–450)
RBC # BLD AUTO: 4.25 X10E6/UL (ref 3.77–5.28)
REASON FOR REFERRAL (NARRATIVE): NORMAL
RECOMMENDATION PATIENT DOC-IMP: NORMAL
REF LAB TEST METHOD: NORMAL
RH BLD: POSITIVE
RPR SER QL: NON REACTIVE
RUBV IGG SERPL IA-ACNC: <0.9 INDEX
SERVICE CMNT 02-IMP: NORMAL
SERVICE CMNT-IMP: NORMAL
SPECIMEN SOURCE: NORMAL
TSH SERPL DL<=0.005 MIU/L-ACNC: 1.3 UIU/ML (ref 0.45–4.5)
URATE SERPL-MCNC: 2.2 MG/DL (ref 2.6–6.2)
WBC # BLD AUTO: 5.9 X10E3/UL (ref 3.4–10.8)

## 2024-02-09 LAB
AMPHETAMINES UR QL SCN: NEGATIVE NG/ML
BACTERIA UR CULT: NORMAL
BACTERIA UR CULT: NORMAL
BARBITURATES UR QL SCN: NEGATIVE NG/ML
BENZODIAZ UR QL SCN: NEGATIVE NG/ML
BZE UR QL SCN: NEGATIVE NG/ML
C TRACH RRNA SPEC QL NAA+PROBE: NEGATIVE
CANNABINOIDS UR QL SCN: POSITIVE NG/ML
CREAT UR-MCNC: 37.6 MG/DL (ref 20–300)
LABORATORY COMMENT REPORT: ABNORMAL
METHADONE UR QL SCN: NEGATIVE NG/ML
N GONORRHOEA RRNA SPEC QL NAA+PROBE: NEGATIVE
OPIATES UR QL SCN: NEGATIVE NG/ML
OXYCODONE+OXYMORPHONE UR QL SCN: NEGATIVE NG/ML
PCP UR QL: NEGATIVE NG/ML
PH UR: 6.5 [PH] (ref 4.5–8.9)
PROPOXYPH UR QL SCN: NEGATIVE NG/ML

## 2024-02-14 ENCOUNTER — LAB (OUTPATIENT)
Dept: OBSTETRICS AND GYNECOLOGY | Facility: CLINIC | Age: 39
End: 2024-02-14
Payer: COMMERCIAL

## 2024-02-14 DIAGNOSIS — Z34.90 PRENATAL CARE, ANTEPARTUM: Primary | ICD-10-CM

## 2024-02-14 DIAGNOSIS — O09.529 ANTEPARTUM MULTIGRAVIDA OF ADVANCED MATERNAL AGE: ICD-10-CM

## 2024-02-21 LAB
ABO GROUP BLD: ABNORMAL
ALP SERPL-CCNC: 43 IU/L (ref 44–121)
ALT SERPL-CCNC: 12 IU/L (ref 0–32)
AST SERPL-CCNC: 12 IU/L (ref 0–40)
BASOPHILS # BLD AUTO: 0 X10E3/UL (ref 0–0.2)
BASOPHILS NFR BLD AUTO: 0 %
BILIRUB SERPL-MCNC: 0.6 MG/DL (ref 0–1.2)
BLD GP AB SCN SERPL QL: NEGATIVE
CITATION REF LAB TEST: NORMAL
CREAT SERPL-MCNC: 0.56 MG/DL (ref 0.57–1)
EGFRCR SERPLBLD CKD-EPI 2021: 119 ML/MIN/1.73
EOSINOPHIL # BLD AUTO: 0.1 X10E3/UL (ref 0–0.4)
EOSINOPHIL NFR BLD AUTO: 1 %
ERYTHROCYTE [DISTWIDTH] IN BLOOD BY AUTOMATED COUNT: 13 % (ref 11.7–15.4)
ETHNIC BACKGROUND STATED: NORMAL
GENE DIS ANL CARRIER INTERP-IMP: NORMAL
GENE STUDIED ID: NORMAL
HBA1C MFR BLD: 5.1 % (ref 4.8–5.6)
HBV SURFACE AG SERPL QL IA: NEGATIVE
HCT VFR BLD AUTO: 40 % (ref 34–46.6)
HCV IGG SERPL QL IA: NON REACTIVE
HGB BLD-MCNC: 13.3 G/DL (ref 11.1–15.9)
HIV 1+2 AB+HIV1 P24 AG SERPL QL IA: NON REACTIVE
IMM GRANULOCYTES # BLD AUTO: 0 X10E3/UL (ref 0–0.1)
IMM GRANULOCYTES NFR BLD AUTO: 0 %
LAB DIRECTOR NAME PROVIDER: NORMAL
LDH SERPL L TO P-CCNC: 151 IU/L (ref 119–226)
LYMPHOCYTES # BLD AUTO: 1.3 X10E3/UL (ref 0.7–3.1)
LYMPHOCYTES NFR BLD AUTO: 22 %
Lab: NORMAL
MCH RBC QN AUTO: 31.3 PG (ref 26.6–33)
MCHC RBC AUTO-ENTMCNC: 33.3 G/DL (ref 31.5–35.7)
MCV RBC AUTO: 94 FL (ref 79–97)
MOL DX INTERP BLD/T QL: NORMAL
MONOCYTES # BLD AUTO: 0.3 X10E3/UL (ref 0.1–0.9)
MONOCYTES NFR BLD AUTO: 5 %
NEUTROPHILS # BLD AUTO: 4.3 X10E3/UL (ref 1.4–7)
NEUTROPHILS NFR BLD AUTO: 72 %
PLATELET # BLD AUTO: 189 X10E3/UL (ref 150–450)
RBC # BLD AUTO: 4.25 X10E6/UL (ref 3.77–5.28)
REASON FOR REFERRAL (NARRATIVE): NORMAL
RECOMMENDATION PATIENT DOC-IMP: NORMAL
REF LAB TEST METHOD: NORMAL
RH BLD: POSITIVE
RPR SER QL: NON REACTIVE
RUBV IGG SERPL IA-ACNC: <0.9 INDEX
SERVICE CMNT-IMP: NORMAL
SPECIMEN SOURCE: NORMAL
TSH SERPL DL<=0.005 MIU/L-ACNC: 1.3 UIU/ML (ref 0.45–4.5)
URATE SERPL-MCNC: 2.2 MG/DL (ref 2.6–6.2)
WBC # BLD AUTO: 5.9 X10E3/UL (ref 3.4–10.8)

## 2024-03-05 ENCOUNTER — OFFICE VISIT (OUTPATIENT)
Dept: OBSTETRICS AND GYNECOLOGY | Facility: HOSPITAL | Age: 39
End: 2024-03-05
Payer: COMMERCIAL

## 2024-03-05 ENCOUNTER — ROUTINE PRENATAL (OUTPATIENT)
Dept: OBSTETRICS AND GYNECOLOGY | Facility: CLINIC | Age: 39
End: 2024-03-05
Payer: COMMERCIAL

## 2024-03-05 ENCOUNTER — HOSPITAL ENCOUNTER (OUTPATIENT)
Dept: WOMENS IMAGING | Facility: HOSPITAL | Age: 39
Discharge: HOME | End: 2024-03-05
Admitting: OBSTETRICS & GYNECOLOGY
Payer: COMMERCIAL

## 2024-03-05 VITALS — SYSTOLIC BLOOD PRESSURE: 108 MMHG | DIASTOLIC BLOOD PRESSURE: 64 MMHG | BODY MASS INDEX: 29.83 KG/M2 | WEIGHT: 184.8 LBS

## 2024-03-05 VITALS — WEIGHT: 181.6 LBS | DIASTOLIC BLOOD PRESSURE: 64 MMHG | BODY MASS INDEX: 29.31 KG/M2 | SYSTOLIC BLOOD PRESSURE: 118 MMHG

## 2024-03-05 DIAGNOSIS — Z90.3 HISTORY OF SLEEVE GASTRECTOMY: ICD-10-CM

## 2024-03-05 DIAGNOSIS — Z87.51 HISTORY OF PRETERM DELIVERY: ICD-10-CM

## 2024-03-05 DIAGNOSIS — Z34.90 PRENATAL CARE, ANTEPARTUM: Primary | ICD-10-CM

## 2024-03-05 DIAGNOSIS — Z98.891 PREVIOUS CESAREAN SECTION: ICD-10-CM

## 2024-03-05 DIAGNOSIS — O09.529 ANTEPARTUM MULTIGRAVIDA OF ADVANCED MATERNAL AGE: ICD-10-CM

## 2024-03-05 DIAGNOSIS — F33.42 RECURRENT MAJOR DEPRESSIVE DISORDER, IN FULL REMISSION: ICD-10-CM

## 2024-03-05 DIAGNOSIS — Z3A.12 12 WEEKS GESTATION OF PREGNANCY: ICD-10-CM

## 2024-03-05 DIAGNOSIS — R11.2 NAUSEA AND VOMITING, UNSPECIFIED VOMITING TYPE: ICD-10-CM

## 2024-03-05 DIAGNOSIS — O09.529 ANTEPARTUM MULTIGRAVIDA OF ADVANCED MATERNAL AGE: Primary | ICD-10-CM

## 2024-03-05 LAB
GLUCOSE UR STRIP-MCNC: NEGATIVE MG/DL
PROT UR STRIP-MCNC: NEGATIVE MG/DL

## 2024-03-05 PROCEDURE — 76817 TRANSVAGINAL US OBSTETRIC: CPT

## 2024-03-05 PROCEDURE — 99203 OFFICE O/P NEW LOW 30 MIN: CPT | Performed by: OBSTETRICS & GYNECOLOGY

## 2024-03-05 PROCEDURE — 0502F SUBSEQUENT PRENATAL CARE: CPT | Performed by: OBSTETRICS & GYNECOLOGY

## 2024-03-05 PROCEDURE — 76817 TRANSVAGINAL US OBSTETRIC: CPT | Performed by: OBSTETRICS & GYNECOLOGY

## 2024-03-05 PROCEDURE — 76801 OB US < 14 WKS SINGLE FETUS: CPT

## 2024-03-05 PROCEDURE — 76813 OB US NUCHAL MEAS 1 GEST: CPT | Performed by: OBSTETRICS & GYNECOLOGY

## 2024-03-05 PROCEDURE — 76813 OB US NUCHAL MEAS 1 GEST: CPT

## 2024-03-05 PROCEDURE — 76801 OB US < 14 WKS SINGLE FETUS: CPT | Performed by: OBSTETRICS & GYNECOLOGY

## 2024-03-05 RX ORDER — PROGESTERONE 100 MG/1
200 CAPSULE ORAL
Qty: 60 CAPSULE | Refills: 4 | Status: SHIPPED | OUTPATIENT
Start: 2024-03-05 | End: 2024-08-02

## 2024-03-05 RX ORDER — FLUOXETINE 10 MG/1
1 CAPSULE ORAL DAILY
COMMUNITY
Start: 2024-02-20

## 2024-03-05 RX ORDER — PRENATAL VIT NO.126/IRON/FOLIC 28MG-0.8MG
TABLET ORAL DAILY
COMMUNITY

## 2024-03-05 RX ORDER — PROMETHAZINE HYDROCHLORIDE 25 MG/1
TABLET ORAL
Qty: 15 TABLET | Refills: 0 | Status: SHIPPED | OUTPATIENT
Start: 2024-03-05

## 2024-03-05 NOTE — LETTER
2024     Itzel Damon MD  [facility]  [place] [address] [place] [id number]    Patient: Ely De La Garza   YOB: 1985   Date of Visit: 3/5/2024     Dear Itzel Damon MD:       Thank you for referring Ely De La Garza to me for evaluation. Below are the relevant portions of my assessment and plan of care.    If you have questions, please do not hesitate to call me. I look forward to following Ely along with you.         Sincerely,        Douglas A. Milligan, MD        CC: No Recipients    Milligan, Douglas A, MD  [phone]  Sign when Signing Visit  Documentation of the ultrasound findings, images, and interpretations will be available in the patient's Viewpoint report which is located in the imaging tab in chart review.    Maternal/Fetal Medicine Consult Note     Name: Ely De La Garza    : 1985     MRN: [id number]     Referring Provider: Itzel Damon MD    Chief Complaint  Hx of Ptd at 29 weeks, ama, hx of c/s     Subjective     History of Present Illness:  Ely De La Garza is a 38 y.o.  12w1d who presents today for AMA, prior PTD    KYLE: Estimated Date of Delivery: 24     ROS:   As noted in HPI.     Past Medical History:   Diagnosis Date   • Abnormal findings on diagnostic imaging of breast     microcalcification in right breast   • Contact with and (suspected) exposure to other viral communicable diseases    • Depression    • Encounter for insertion of mirena IUD 2019    Due for removal 2025   • Esophageal reflux    • Female infertility     took fertility meds to get pregnant   • Generalized anxiety disorder    • GERD (gastroesophageal reflux disease)    • Herpes zoster    • History of COVID-19    • Hx of Gestational hypertension     was overweight at that time   • Hx of Polycystic ovary syndrome    • Hx of Trochanteric bursitis of left hip     Left hip replacement 2023   • Hx of Type 2 diabetes mellitus     never on  "insulin/metformin -  lost weight 8 years ago   • Migraine without aura, not intractable, without status migrainosus    • Nausea and vomiting     currently with pregnancy   • Obese    • Pregnancy 2024   • Sacrococcygeal disorders, not elsewhere classified    • Wears contact lenses    • Wears eyeglasses       Past Surgical History:   Procedure Laterality Date   • ABDOMINOPLASTY     • BODY LIFT N/A 2017    Procedure: CIRCUMFERENTIAL BODY LIFT;  Surgeon: Enrique Leroy MD;  Location:  [facility];  Service:    • BODY LIFT Bilateral 2019    Procedure: BILATERAL THIGH  LIFT WITH LIPOSUCTION;  Surgeon: Enrique Leroy MD;  Location:  [facility];  Service: Plastics   • BREAST AUGMENTATION     •  SECTION     • GASTRIC SLEEVE LAPAROSCOPIC  08/10/2020   • INTRAUTERINE DEVICE INSERTION  2019    Mirena; due for removal 2025   • LIPOMA EXCISION     • LIPOSUCTION ABDOMINAL N/A 2019    Procedure: LIPOSUCTION ABDOMINAL;  Surgeon: Enrique Leroy MD;  Location:  [facility];  Service: Plastics   • LUNG BIOPSY     • OTHER SURGICAL HISTORY      Back Lift   • REPLACEMENT TOTAL HIP LATERAL POSITION Left     ; born with hip dysplasia   • TONSILLECTOMY     • WISDOM TOOTH EXTRACTION        OB History          2    Para   1    Term   0       1    AB   0    Living   1         SAB   0    IAB   0    Ectopic   0    Molar   0    Multiple   0    Live Births   1          Obstetric Comments   Fob #1 - Pregnancy #1 and #2     Pregnancy #1 - Pt reports she was in the hospital for 3 days on bedrest, then had PROM, was 6cm at that time                Objective     Vital Signs  /64   Wt 82.4 kg (181 lb 9.6 oz)   LMP 2023   Estimated body mass index is 29.31 kg/m² as calculated from the following:    Height as of 23: 167.6 cm (66\").    Weight as of this encounter: 82.4 kg (181 lb 9.6 oz).    Physical Exam    Ultrasound Impression:   See Viewpoint    Assessment and Plan "     Ely De La Garza is a 38 y.o.  12w1d who presents today for AMA, prior PTD    Diagnoses and all orders for this visit:    1. Antepartum multigravida of advanced maternal age (Primary)  Assessment & Plan:  The patient will be 38 years old at the time of delivery.  She was quoted the following age-related risks at term:  Risk for Down syndrome 1 in 140, risk for any chromosomal abnormality 1 in 80.  Options in genetic testing and genetic screening were discussed with the patient.  I noted an option of genetic testing in the 2nd trimester of transabdominal amniocentesis for the determination of fetal chromosomal complement as well as amniotic fluid alpha-fetoprotein for the evaluation of a fetal open neural tube defect.  A procedure-related fetal loss rate of 1 in 500 would be quoted with midtrimester amniocentesis.  I also discussed the option of analysis of cell-free fetal DNA in maternal blood.  I noted this directed analysis measures the relative proportion of chromosomes with the detection rate of fetal Down syndrome quoted as 99% with a false positive rate of less than .1%.  Screening for other fetal aneuploidies is also possible but the detection rate is lower with cell-free fetal DNA technology.  I then discussed the clinical utility of ultrasound and/or biochemical screening for the detection of fetal aneuploidy as well as fetal open neural tube defects.  Further, I have reviewed her self-reported family history and made suggestions as appropriate based on my review.    Patient is already had a cell free DNA screen that returned as low risk.  We discussed that this significantly lowers the risk of chromosomal abnormalities but does not eliminate risk.  Amniocentesis was again discussed the procedure was explained and the risks including a 1 and 500 risk of procedure related pregnancy loss were outlined.  After careful consideration the patient declines amniocentesis at present and will return  for anatomic survey and cervical length at 18 weeks gestation.      Orders:  -     [facility]; Future    2. History of  delivery  Assessment & Plan:  Spontaneous  delivery in a previous pregnancy is well documented as placing subsequent pregnancies at risk for prematurity. For example, women with one previous  birth are 2-4 times more likely to deliver another  infant and women with two previous  births have a four- to sixfold increased risk for delivering  compared to multiparous women with no previous  birth.   Ultrasound today demonstrates a normally grown fetus with no abnormality seen.  Cervical length on transvaginal scanning is entirely normal with no funneling.  We will rescan the patient again at 18 weeks gestation to evaluate cervical length again.    I provided the patient with a prescription for vaginal Prometrium and indicated she should start this at the beginning at about 16 weeks gestation.    Classically, the term “cervical insufficiency” is used to describe painless cervical dilation leading to recurrent 2nd trimester pregnancy losses/births of otherwise normal pregnancies. Although structural cervical weakness is the source of some 2nd trimester losses/births, most are caused by other disorders such as decidual inflammation/infection, hemorrhage or uterine overdistension. These disorders, which are not typically recurrent, can initiate biochemical changes in the cervix that lead to premature cervical shortening and, often, pregnancy loss.     A history-based diagnosis of cervical insufficiency is made for women with =2 consecutive prior 2nd trimester pregnancy losses associated with relatively painless early cervical dilatation or =3 early (<34 weeks)  births in which other causes of pregnancy loss or  birth (e.g., infection, placental bleeding, multiple gestation,  labor) have been excluded. A diagnosis  of cervical insufficiency based on history, ultrasound and physical exam, however, would include women with one or two prior 2nd trimester pregnancy losses or  births and cervical length <25 mm on transvaginal ultrasound examination or advanced cervical changes on physical examination before 24 weeks of gestation.     The majority of women with suspected cervical insufficiency do not meet criteria for a history-based diagnosis of cervical insufficiency. For these patients Progesterone prophylaxis is administered against  delivery, cervical length is monitored with transvaginal ultrasound and a cerclage is placed if cervical length is shortened. The effects of progesterone on the myometrium are twofold: It suppresses the action of estrogen by inhibiting the replacement of cytosolic estrogen receptors and it exerts a direct effect on the biosynthetic process of the uterus through its own cellular receptor.      Women who are candidates for progesterone supplementation should have therapy initiated between 16 and 24 weeks' gestation and continued through 36 weeks' gestation.    Cervical length screening is usually initiated at 16-18 weeks, but screening may be started as early as 12 weeks in women with early 2nd trimester losses, recurrent 2nd trimester losses or a prior large cold-knife conization. Ultrasound examination is generally repeated every two weeks until 22-24 weeks.    Orders:  -     [facility]; Future    3. Previous  section  -     [facility]; Future    4. History of sleeve gastrectomy  -     [facility]; Future    5. 12 weeks gestation of pregnancy  -     [facility]; Future         Follow Up  Return in about 6 weeks (around 2024).    I spent 30 minutes caring for the patient on the day of service. This included: obtaining or reviewing a separately obtained medical history, reviewing  patient records, performing a medically appropriate exam and/or evaluation, counseling or educating the patient/family/caregiver, ordering medications, labs, and/or procedures and documenting such in the medical record. This does not include time spent on review and interpretation of other tests such as fetal ultrasound or the performance of other procedures such as amniocentesis or CVS.      Douglas A. Milligan, MD  Maternal Fetal Medicine, [facility]     2024

## 2024-03-05 NOTE — ASSESSMENT & PLAN NOTE
The patient will be 38 years old at the time of delivery.  She was quoted the following age-related risks at term:  Risk for Down syndrome 1 in 140, risk for any chromosomal abnormality 1 in 80.  Options in genetic testing and genetic screening were discussed with the patient.  I noted an option of genetic testing in the 2nd trimester of transabdominal amniocentesis for the determination of fetal chromosomal complement as well as amniotic fluid alpha-fetoprotein for the evaluation of a fetal open neural tube defect.  A procedure-related fetal loss rate of 1 in 500 would be quoted with midtrimester amniocentesis.  I also discussed the option of analysis of cell-free fetal DNA in maternal blood.  I noted this directed analysis measures the relative proportion of chromosomes with the detection rate of fetal Down syndrome quoted as 99% with a false positive rate of less than .1%.  Screening for other fetal aneuploidies is also possible but the detection rate is lower with cell-free fetal DNA technology.  I then discussed the clinical utility of ultrasound and/or biochemical screening for the detection of fetal aneuploidy as well as fetal open neural tube defects.  Further, I have reviewed her self-reported family history and made suggestions as appropriate based on my review.    Patient is already had a cell free DNA screen that returned as low risk.  We discussed that this significantly lowers the risk of chromosomal abnormalities but does not eliminate risk.  Amniocentesis was again discussed the procedure was explained and the risks including a 1 and 500 risk of procedure related pregnancy loss were outlined.  After careful consideration the patient declines amniocentesis at present and will return for anatomic survey and cervical length at 18 weeks gestation.

## 2024-03-05 NOTE — PROGRESS NOTES
OB FOLLOW UP  CC- Here for care of pregnancy        Ely De La Garza is a 38 y.o.  12w1d patient being seen today for her obstetrical follow up visit. Patient reports daily nausea and vomiting (3-4 times a day) relieved phenergan. Pt would like prescription for phenergan. Pt states she stopped taking Protonix 1.5 weeks ago due to +THC in urine. Pt is having increased heartburn and would like something for that since Tums do not provide relief.     Pt went to [facility] today and they started progesterone until 36 weeks.     Her prenatal care is complicated by (and status) :   Patient Active Problem List   Diagnosis    Depression    Polycystic ovaries    History of sleeve gastrectomy    Pregnancy    Previous  section    Antepartum multigravida of advanced maternal age    History of  delivery       Genetic testing?: already completed and was normal.  NOB labs reviewed, THC positive, pt denies smoking  Flu Status:  may desire  Ultrasound Today: Yes at [facility].     ROS -   Patient Denies: leaking of fluid, vaginal bleeding, dysuria, and more than 6 contractions per hour  All other systems reviewed and are negative.     The additional following portions of the patient's history were reviewed and updated as appropriate: allergies and current medications.    I have reviewed and agree with the HPI, ROS, and historical information as entered above. Itzel Damon MD          /64   Wt 83.8 kg (184 lb 12.8 oz)   LMP 2023   BMI 29.83 kg/m²         EXAM:     Prenatal Vitals  BP: 108/64  Weight: 83.8 kg (184 lb 12.8 oz)   Fetal Heart Rate: +          Urine Glucose Read-only: Negative  Urine Protein Read-only: Negative       Assessment and Plan    Problem List Items Addressed This Visit       Depression    Overview     On vraylar, wellbutrin and prozac nob         Relevant Medications    buPROPion XL (WELLBUTRIN XL) 300 MG 24 hr tablet    Vraylar 1.5 MG capsule capsule    FLUoxetine (PROzac) 10  MG capsule    History of sleeve gastrectomy    Overview     >350 lb weight loss, history HTN, DM prior to this, normal since  Baby asa         Pregnancy    Overview     G1- 29 wk c/s, presented with bulging bag, then pprom. Possible incompetent cervix.   Vaginal progesterone started 16 wks, CL 16 wks         Previous  section    Overview     ?uterine incision, get op report         Relevant Orders    US Ob Transvaginal    Antepartum multigravida of advanced maternal age    Overview     cfDNA low risk         History of  delivery    Relevant Orders    US Ob Transvaginal     Other Visit Diagnoses       Prenatal care, antepartum    -  Primary    Relevant Orders    POC Urinalysis Dipstick (Completed)    Nausea and vomiting, unspecified vomiting type        Relevant Medications    promethazine (PHENERGAN) 25 MG tablet            Pregnancy at 12w1d.  [facility] today, normal CL, started on vaginal progesterone. Has FU with them 18 wks. Will plan on CL here 16 wks.   Labs reviewed from New OB Visit.  Counseled on genetic testing, carrier status and option for NT screen  Activity and Exercise discussed.  Patient is on Prenatal vitamins  Return in about 1 month (around 2024) for F/U Prenatal, U/S Next Visit.    Itzel Damon MD  2024

## 2024-03-05 NOTE — ASSESSMENT & PLAN NOTE
Spontaneous  delivery in a previous pregnancy is well documented as placing subsequent pregnancies at risk for prematurity. For example, women with one previous  birth are 2-4 times more likely to deliver another  infant and women with two previous  births have a four- to sixfold increased risk for delivering  compared to multiparous women with no previous  birth.   Ultrasound today demonstrates a normally grown fetus with no abnormality seen.  Cervical length on transvaginal scanning is entirely normal with no funneling.  We will rescan the patient again at 18 weeks gestation to evaluate cervical length again.    I provided the patient with a prescription for vaginal Prometrium and indicated she should start this at the beginning at about 16 weeks gestation.    Classically, the term “cervical insufficiency” is used to describe painless cervical dilation leading to recurrent 2nd trimester pregnancy losses/births of otherwise normal pregnancies. Although structural cervical weakness is the source of some 2nd trimester losses/births, most are caused by other disorders such as decidual inflammation/infection, hemorrhage or uterine overdistension. These disorders, which are not typically recurrent, can initiate biochemical changes in the cervix that lead to premature cervical shortening and, often, pregnancy loss.     A history-based diagnosis of cervical insufficiency is made for women with ?2 consecutive prior 2nd trimester pregnancy losses associated with relatively painless early cervical dilatation or ?3 early (<34 weeks)  births in which other causes of pregnancy loss or  birth (e.g., infection, placental bleeding, multiple gestation,  labor) have been excluded. A diagnosis of cervical insufficiency based on history, ultrasound and physical exam, however, would include women with one or two prior 2nd trimester pregnancy losses or  births and  cervical length <25 mm on transvaginal ultrasound examination or advanced cervical changes on physical examination before 24 weeks of gestation.     The majority of women with suspected cervical insufficiency do not meet criteria for a history-based diagnosis of cervical insufficiency. For these patients Progesterone prophylaxis is administered against  delivery, cervical length is monitored with transvaginal ultrasound and a cerclage is placed if cervical length is shortened. The effects of progesterone on the myometrium are twofold: It suppresses the action of estrogen by inhibiting the replacement of cytosolic estrogen receptors and it exerts a direct effect on the biosynthetic process of the uterus through its own cellular receptor.      Women who are candidates for progesterone supplementation should have therapy initiated between 16 and 24 weeks' gestation and continued through 36 weeks' gestation.    Cervical length screening is usually initiated at 16-18 weeks, but screening may be started as early as 12 weeks in women with early 2nd trimester losses, recurrent 2nd trimester losses or a prior large cold-knife conization. Ultrasound examination is generally repeated every two weeks until 22-24 weeks.

## 2024-03-25 ENCOUNTER — OFFICE VISIT (OUTPATIENT)
Dept: FAMILY MEDICINE CLINIC | Facility: CLINIC | Age: 39
End: 2024-03-25
Payer: COMMERCIAL

## 2024-03-25 VITALS
DIASTOLIC BLOOD PRESSURE: 72 MMHG | RESPIRATION RATE: 18 BRPM | BODY MASS INDEX: 30.34 KG/M2 | TEMPERATURE: 97.9 F | HEIGHT: 66 IN | HEART RATE: 89 BPM | OXYGEN SATURATION: 98 % | SYSTOLIC BLOOD PRESSURE: 120 MMHG | WEIGHT: 188.8 LBS

## 2024-03-25 DIAGNOSIS — G43.109 MIGRAINE WITH AURA AND WITHOUT STATUS MIGRAINOSUS, NOT INTRACTABLE: Primary | ICD-10-CM

## 2024-03-25 PROCEDURE — 99213 OFFICE O/P EST LOW 20 MIN: CPT | Performed by: NURSE PRACTITIONER

## 2024-03-25 NOTE — PROGRESS NOTES
Office Note     Name: Ely De La Garza    : 1985     MRN: [id number]     Chief Complaint  Migraine    Subjective     History of Present Illness:  Ely De La Garza is a 38 y.o. female who presents today for complaints of migraine.  Patient reports longstanding history of migraine headache, prior to pregnancy utilizing topiramate twice daily with good benefit in regards to migraine prophylaxis.  Patient states over the last 2 weeks she has experienced increased frequency of migraine. She reports she had one migraine last week that last two days and one this week that lasted two days, no pain today. In describing her migraines she notes left sided head pain, ice pick, stabbing pain, sensitivty to light and sound as well as nausea. Patient reports laying in bed two days straight. Took acetampinohpen without benefit. Patient is currently 15 weeks gestation and has appointment with GYN in a few days for follow-up. BP within normal limits.  She states she has been experiencing quite a bit of nausea with this pregnancy but is maintaining adequate hydration with good urinary output.  She has no further complaints or concerns today    Review of Systems   Constitutional:  Positive for fatigue.   HENT:  Negative for facial swelling and trouble swallowing.    Eyes:  Negative for blurred vision and double vision.   Respiratory:  Negative for cough, chest tightness, shortness of breath and wheezing.    Cardiovascular:  Negative for chest pain, palpitations and leg swelling.   Gastrointestinal:  Positive for nausea and vomiting. Negative for abdominal pain, constipation and diarrhea.   Endocrine: Negative for polydipsia and polyuria.   Genitourinary:  Negative for difficulty urinating, flank pain and frequency.   Musculoskeletal:  Negative for joint swelling.   Neurological:  Positive for headache. Negative for dizziness, weakness and light-headedness.       Objective     Past Medical History:   Diagnosis Date     Abnormal findings on diagnostic imaging of breast     microcalcification in right breast    Contact with and (suspected) exposure to other viral communicable diseases     Depression     Encounter for insertion of mirena IUD 2019    Due for removal 2025    Esophageal reflux     Female infertility     took fertility meds to get pregnant    Generalized anxiety disorder     GERD (gastroesophageal reflux disease)     Herpes zoster     History of COVID-19     Hx of Gestational hypertension 2009    was overweight at that time    Hx of Polycystic ovary syndrome     Hx of Trochanteric bursitis of left hip     Left hip replacement 2023    Hx of Type 2 diabetes mellitus     never on insulin/metformin -  lost weight 8 years ago    Migraine without aura, not intractable, without status migrainosus     Nausea and vomiting     currently with pregnancy    Obese     Pregnancy 2024    Sacrococcygeal disorders, not elsewhere classified     Wears contact lenses     Wears eyeglasses      Past Surgical History:   Procedure Laterality Date    ABDOMINOPLASTY      BODY LIFT N/A 2017    Procedure: CIRCUMFERENTIAL BODY LIFT;  Surgeon: Enrique Leroy MD;  Location:  [facility];  Service:     BODY LIFT Bilateral 2019    Procedure: BILATERAL THIGH  LIFT WITH LIPOSUCTION;  Surgeon: Enrique Leroy MD;  Location:  [facility];  Service: Plastics    BREAST AUGMENTATION       SECTION      GASTRIC SLEEVE LAPAROSCOPIC  08/10/2020    INTRAUTERINE DEVICE INSERTION  2019    Mirena; due for removal 2025    LIPOMA EXCISION      LIPOSUCTION ABDOMINAL N/A 2019    Procedure: LIPOSUCTION ABDOMINAL;  Surgeon: Enrique Leroy MD;  Location:  [facility];  Service: Plastics    LUNG BIOPSY      OTHER SURGICAL HISTORY      Back Lift    REPLACEMENT TOTAL HIP LATERAL POSITION Left     ; born with hip dysplasia    TONSILLECTOMY      WISDOM TOOTH EXTRACTION       Family History   Problem Relation Age  "of Onset    Heart disease Father     Diabetes Father     Cancer Father     Heart disease Mother     Heart disease Maternal Grandmother     Thyroid disease Maternal Grandmother     Cervical cancer Maternal Grandmother     Uterine cancer Maternal Grandmother     Hypertension Other     Obesity Other     Hypertension Other     Breast cancer Other         2 SISTERS OF MATERNAL GRANDFATHER       Vital Signs  /72 (BP Location: Left arm, Patient Position: Sitting, Cuff Size: Adult)   Pulse 89   Temp 97.9 °F (36.6 °C) (Temporal)   Resp 18   Ht 167.6 cm (66\")   Wt 85.6 kg (188 lb 12.8 oz)   SpO2 98%   BMI 30.47 kg/m²   Estimated body mass index is 30.47 kg/m² as calculated from the following:    Height as of this encounter: 167.6 cm (66\").    Weight as of this encounter: 85.6 kg (188 lb 12.8 oz).  Facility age limit for growth %rob is 20 years.    Physical Exam  Vitals reviewed.   Constitutional:       Appearance: Normal appearance.   HENT:      Head: Normocephalic and atraumatic.      Right Ear: Tympanic membrane, ear canal and external ear normal.      Left Ear: Tympanic membrane, ear canal and external ear normal.      Nose: Nose normal.      Mouth/Throat:      Mouth: Mucous membranes are moist.      Pharynx: Oropharynx is clear.   Eyes:      Conjunctiva/sclera: Conjunctivae normal.      Pupils: Pupils are equal, round, and reactive to light.   Cardiovascular:      Rate and Rhythm: Normal rate and regular rhythm.      Pulses: Normal pulses.      Heart sounds: Normal heart sounds.   Pulmonary:      Effort: Pulmonary effort is normal.      Breath sounds: Normal breath sounds.   Abdominal:      General: Bowel sounds are normal.      Palpations: Abdomen is soft.   Musculoskeletal:         General: Normal range of motion.      Cervical back: Neck supple.   Skin:     General: Skin is warm and dry.   Neurological:      Mental Status: She is alert and oriented to person, place, and time.   Psychiatric:         Mood " and Affect: Mood normal.         Behavior: Behavior normal.         Thought Content: Thought content normal.         Judgment: Judgment normal.          POCT Results (if applicable):  Results for orders placed or performed in visit on 03/05/24   POC Urinalysis Dipstick    Specimen: Urine   Result Value Ref Range    Glucose, UA Negative Negative mg/dL    Protein, POC Negative Negative mg/dL            Assessment and Plan     Diagnoses and all orders for this visit:    1. Migraine with aura and without status migrainosus, not intractable (Primary)  Assessment & Plan:     presents today for complaints of migraine.  Patient reports longstanding history of migraine headache, prior to pregnancy utilizing topiramate twice daily with good benefit in regards to migraine prophylaxis.  Patient states over the last 2 weeks she has experienced increased frequency of migraine. She reports she had one migraine last week that last two days and one this week that lasted two days, no pain today. In describing her migraines she notes left sided head pain, ice pick, stabbing pain, sensitivty to light and sound as well as nausea. Patient reports laying in bed two days straight. Took acetampinohpen without benefit. Patient is currently 15 weeks gestation and has appointment with GYN in a few days for follow-up. BP within normal limits.  -Patient advised to utilize acetaminophen for migraine type pain until approval from GYN service in the next couple of days.  There is conflicting data on safety of topiramate use in pregnancy, feel that GYN is better equipped to make this decision in her current state.                   Follow Up  No follow-ups on file.    CHUCK Castañeda

## 2024-03-26 NOTE — ASSESSMENT & PLAN NOTE
presents today for complaints of migraine.  Patient reports longstanding history of migraine headache, prior to pregnancy utilizing topiramate twice daily with good benefit in regards to migraine prophylaxis.  Patient states over the last 2 weeks she has experienced increased frequency of migraine. She reports she had one migraine last week that last two days and one this week that lasted two days, no pain today. In describing her migraines she notes left sided head pain, ice pick, stabbing pain, sensitivty to light and sound as well as nausea. Patient reports laying in bed two days straight. Took acetampinohpen without benefit. Patient is currently 15 weeks gestation and has appointment with GYN in a few days for follow-up. BP within normal limits.  -Patient advised to utilize acetaminophen for migraine type pain until approval from GYN service in the next couple of days.  There is conflicting data on safety of topiramate use in pregnancy, feel that GYN is better equipped to make this decision in her current state.

## 2024-04-01 ENCOUNTER — ROUTINE PRENATAL (OUTPATIENT)
Dept: OBSTETRICS AND GYNECOLOGY | Facility: CLINIC | Age: 39
End: 2024-04-01
Payer: COMMERCIAL

## 2024-04-01 VITALS — SYSTOLIC BLOOD PRESSURE: 116 MMHG | DIASTOLIC BLOOD PRESSURE: 62 MMHG | BODY MASS INDEX: 30.18 KG/M2 | WEIGHT: 187 LBS

## 2024-04-01 DIAGNOSIS — Z79.899 POLYPHARMACY: ICD-10-CM

## 2024-04-01 DIAGNOSIS — Z34.90 PRENATAL CARE, ANTEPARTUM: Primary | ICD-10-CM

## 2024-04-01 DIAGNOSIS — Z86.69 HX OF MIGRAINES: ICD-10-CM

## 2024-04-01 DIAGNOSIS — O44.00 PLACENTA PREVIA ANTEPARTUM: ICD-10-CM

## 2024-04-01 DIAGNOSIS — Z90.3 HISTORY OF SLEEVE GASTRECTOMY: ICD-10-CM

## 2024-04-01 DIAGNOSIS — G43.109 MIGRAINE WITH AURA AND WITHOUT STATUS MIGRAINOSUS, NOT INTRACTABLE: ICD-10-CM

## 2024-04-01 DIAGNOSIS — Z3A.16 16 WEEKS GESTATION OF PREGNANCY: ICD-10-CM

## 2024-04-01 DIAGNOSIS — Z98.891 PREVIOUS CESAREAN SECTION: ICD-10-CM

## 2024-04-01 DIAGNOSIS — Z87.51 HISTORY OF PRETERM DELIVERY: ICD-10-CM

## 2024-04-01 DIAGNOSIS — H53.9 VISION CHANGES: ICD-10-CM

## 2024-04-01 DIAGNOSIS — F32.89 OTHER DEPRESSION: ICD-10-CM

## 2024-04-01 DIAGNOSIS — O09.529 ANTEPARTUM MULTIGRAVIDA OF ADVANCED MATERNAL AGE: ICD-10-CM

## 2024-04-01 LAB
ALP SERPL-CCNC: 44 U/L (ref 39–117)
ALT SERPL-CCNC: 19 U/L (ref 1–33)
AST SERPL-CCNC: 13 U/L (ref 1–32)
BASOPHILS # BLD AUTO: 0.03 10*3/MM3 (ref 0–0.2)
BASOPHILS NFR BLD AUTO: 0.4 % (ref 0–1.5)
BILIRUB SERPL-MCNC: 0.6 MG/DL (ref 0–1.2)
CREAT SERPL-MCNC: 0.75 MG/DL (ref 0.57–1)
EGFRCR SERPLBLD CKD-EPI 2021: 104 ML/MIN/1.73
EOSINOPHIL # BLD AUTO: 0.14 10*3/MM3 (ref 0–0.4)
EOSINOPHIL NFR BLD AUTO: 1.7 % (ref 0.3–6.2)
ERYTHROCYTE [DISTWIDTH] IN BLOOD BY AUTOMATED COUNT: 12.6 % (ref 12.3–15.4)
GLUCOSE UR STRIP-MCNC: NEGATIVE MG/DL
HCT VFR BLD AUTO: 37.7 % (ref 34–46.6)
HGB BLD-MCNC: 12.8 G/DL (ref 12–15.9)
IMM GRANULOCYTES # BLD AUTO: 0.05 10*3/MM3 (ref 0–0.05)
IMM GRANULOCYTES NFR BLD AUTO: 0.6 % (ref 0–0.5)
LDH SERPL L TO P-CCNC: 163 U/L (ref 135–214)
LYMPHOCYTES # BLD AUTO: 1.93 10*3/MM3 (ref 0.7–3.1)
LYMPHOCYTES NFR BLD AUTO: 22.9 % (ref 19.6–45.3)
MCH RBC QN AUTO: 31 PG (ref 26.6–33)
MCHC RBC AUTO-ENTMCNC: 34 G/DL (ref 31.5–35.7)
MCV RBC AUTO: 91.3 FL (ref 79–97)
MONOCYTES # BLD AUTO: 0.55 10*3/MM3 (ref 0.1–0.9)
MONOCYTES NFR BLD AUTO: 6.5 % (ref 5–12)
NEUTROPHILS # BLD AUTO: 5.72 10*3/MM3 (ref 1.7–7)
NEUTROPHILS NFR BLD AUTO: 67.9 % (ref 42.7–76)
NRBC BLD AUTO-RTO: 0 /100 WBC (ref 0–0.2)
PLATELET # BLD AUTO: 196 10*3/MM3 (ref 140–450)
PROT UR STRIP-MCNC: NEGATIVE MG/DL
RBC # BLD AUTO: 4.13 10*6/MM3 (ref 3.77–5.28)
URATE SERPL-MCNC: 2.6 MG/DL (ref 2.4–5.7)
WBC # BLD AUTO: 8.42 10*3/MM3 (ref 3.4–10.8)

## 2024-04-01 PROCEDURE — 0502F SUBSEQUENT PRENATAL CARE: CPT

## 2024-04-01 RX ORDER — MAGNESIUM OXIDE 400 MG/1
400 TABLET ORAL DAILY
Qty: 30 TABLET | Refills: 3 | Status: SHIPPED | OUTPATIENT
Start: 2024-04-01

## 2024-04-01 RX ORDER — METOCLOPRAMIDE 10 MG/1
10 TABLET ORAL 4 TIMES DAILY
Qty: 120 TABLET | Refills: 0 | Status: CANCELLED | OUTPATIENT
Start: 2024-04-01

## 2024-04-01 NOTE — PROGRESS NOTES
OB FOLLOW UP  CC- Here for care of pregnancy        Ely De La Garza is a 38 y.o.  16w0d patient being seen today for her obstetrical follow up visit. Patient reports nausea and vomiting once daily relieved with phenergan.     Pt reports she stopped taking migraine medication (topiramate) when she found out she was pregnant. Pt states her PCP said it was up to us to see if she can go back on medication. Pt states she has 2 migraines every other week that last about 2 days. Pt states she sees black spots and blurry vision. Denies RUQ pain.     Pt is currently taking progesterone until 36weeks and has next appt with [facility] on .     Her prenatal care is complicated by (and status) :   Patient Active Problem List   Diagnosis    Depression    Polycystic ovaries    Migraine with aura and without status migrainosus, not intractable    History of sleeve gastrectomy    Pregnancy    Previous  section    Antepartum multigravida of advanced maternal age    History of  delivery    Placenta previa antepartum       Ultrasound Today: Yes for CL.  Ultrasound showed 155 bpm, cephalic, anterior right lateral placenta appears to be previa, AF normal, cervical length 53.3 mm.    AFP: declines    ROS -   Patient Denies: leaking of fluid, vaginal bleeding, dysuria, excessive vomiting, and more than 6 contractions per hour  All other systems reviewed and are negative.       The additional following portions of the patient's history were reviewed and updated as appropriate: allergies, current medications, past family history, past medical history, past social history, past surgical history, and problem list.      I have reviewed and agree with the HPI, ROS, and historical information as entered above. Stella Marshall, APRN      EXAM:     Prenatal Vitals  BP: 116/62  Weight: 84.8 kg (187 lb)   Fetal Heart Rate: 155       Urine Glucose Read-only: Negative  Urine Protein Read-only: Negative         Assessment  and Plan    Problem List Items Addressed This Visit       Antepartum multigravida of advanced maternal age    Overview     cfDNA low risk         Depression    Overview     On vraylar, wellbutrin and prozac nob         Relevant Medications    buPROPion XL (WELLBUTRIN XL) 300 MG 24 hr tablet    Vraylar 1.5 MG capsule capsule    FLUoxetine (PROzac) 10 MG capsule    History of  delivery    History of sleeve gastrectomy    Overview     >350 lb weight loss, history HTN, DM prior to this, normal since  Baby asa         Migraine with aura and without status migrainosus, not intractable    Overview     Previously managed with topiramate.  Stopped due to pregnancy. Will begin mag oxide 400 mg daily and Benadryl as needed. Referral to neurology due to limited availability with medications medications secondary to polypharmacy.  Consulted with Dr. Ballesteros.         Relevant Medications    buPROPion XL (WELLBUTRIN XL) 300 MG 24 hr tablet    FLUoxetine (PROzac) 10 MG capsule    Placenta previa antepartum    Overview     2024- Anterior right lateral placenta appears to be previa, AF normal, cervical length 53.3 mm.  Pelvic rest, no heavy lifting.         Pregnancy    Overview     G1- 29 wk c/s, presented with bulging bag, then pprom. Possible incompetent cervix.   Vaginal progesterone started 16 wks, CL 16 wks         Previous  section    Overview     ?uterine incision, get op report  Report requested 2024          Other Visit Diagnoses       Prenatal care, antepartum    -  Primary    Relevant Orders    POC Urinalysis Dipstick (Completed)    AlP+ALT+AST+Creat+LD+TBili+..    CBC (No Diff)    Ambulatory Referral to Neurology    Vision changes        Relevant Orders    AlP+ALT+AST+Creat+LD+TBili+..    CBC (No Diff)    Hx of migraines        Relevant Medications    magnesium oxide (MAG-OX) 400 MG tablet    Other Relevant Orders    AlP+ALT+AST+Creat+LD+TBili+..    CBC (No Diff)    Ambulatory Referral to Neurology     Polypharmacy        Relevant Orders    Ambulatory Referral to Neurology            Pregnancy at 16w0d  Fetal status reassuring.   Ultrasound showed placenta previa.  See plan above.  Counseled on MSAFP alone in relation to OTD and placental issues.  Declines.  Anatomy scan next visit.  With [facility].  Activity and Exercise discussed.  Migraines-see plan above.  Neurology consultation ordered.  Preeclampsia symptoms reviewed.  PEP ordered.  Patient is on Prenatal vitamins  Discussed/encouraged social distancing/COVID19 precautions; encouraged vaccination when able  Requested delivery report in previous pregnancy.  Return in about 15 days (around 4/16/2024) for Marisol Willis, Post [facility] appt.    Stella Marshall, APRN  04/01/2024

## 2024-04-16 ENCOUNTER — OFFICE VISIT (OUTPATIENT)
Dept: OBSTETRICS AND GYNECOLOGY | Facility: HOSPITAL | Age: 39
End: 2024-04-16
Payer: COMMERCIAL

## 2024-04-16 ENCOUNTER — ROUTINE PRENATAL (OUTPATIENT)
Dept: OBSTETRICS AND GYNECOLOGY | Facility: CLINIC | Age: 39
End: 2024-04-16
Payer: COMMERCIAL

## 2024-04-16 ENCOUNTER — HOSPITAL ENCOUNTER (OUTPATIENT)
Dept: WOMENS IMAGING | Facility: HOSPITAL | Age: 39
Discharge: HOME | End: 2024-04-16
Admitting: OBSTETRICS & GYNECOLOGY
Payer: COMMERCIAL

## 2024-04-16 VITALS — WEIGHT: 189 LBS | SYSTOLIC BLOOD PRESSURE: 120 MMHG | DIASTOLIC BLOOD PRESSURE: 63 MMHG | BODY MASS INDEX: 30.51 KG/M2

## 2024-04-16 VITALS — BODY MASS INDEX: 30.51 KG/M2 | WEIGHT: 189 LBS | DIASTOLIC BLOOD PRESSURE: 68 MMHG | SYSTOLIC BLOOD PRESSURE: 118 MMHG

## 2024-04-16 DIAGNOSIS — Z34.90 PRENATAL CARE, ANTEPARTUM: Primary | ICD-10-CM

## 2024-04-16 DIAGNOSIS — Z90.3 HISTORY OF SLEEVE GASTRECTOMY: ICD-10-CM

## 2024-04-16 DIAGNOSIS — Z87.51 HISTORY OF PRETERM DELIVERY: ICD-10-CM

## 2024-04-16 DIAGNOSIS — O44.00 PLACENTA PREVIA ANTEPARTUM: Primary | ICD-10-CM

## 2024-04-16 DIAGNOSIS — O44.00 PLACENTA PREVIA ANTEPARTUM: ICD-10-CM

## 2024-04-16 DIAGNOSIS — R11.2 NAUSEA AND VOMITING, UNSPECIFIED VOMITING TYPE: ICD-10-CM

## 2024-04-16 DIAGNOSIS — Z98.891 PREVIOUS CESAREAN SECTION: ICD-10-CM

## 2024-04-16 DIAGNOSIS — O09.529 ANTEPARTUM MULTIGRAVIDA OF ADVANCED MATERNAL AGE: ICD-10-CM

## 2024-04-16 DIAGNOSIS — G43.109 MIGRAINE WITH AURA AND WITHOUT STATUS MIGRAINOSUS, NOT INTRACTABLE: ICD-10-CM

## 2024-04-16 DIAGNOSIS — Z3A.12 12 WEEKS GESTATION OF PREGNANCY: ICD-10-CM

## 2024-04-16 DIAGNOSIS — O44.20 MARGINAL PLACENTA PREVIA: ICD-10-CM

## 2024-04-16 LAB
GLUCOSE UR STRIP-MCNC: ABNORMAL MG/DL
PROT UR STRIP-MCNC: NEGATIVE MG/DL

## 2024-04-16 PROCEDURE — 99213 OFFICE O/P EST LOW 20 MIN: CPT | Performed by: OBSTETRICS & GYNECOLOGY

## 2024-04-16 PROCEDURE — 76811 OB US DETAILED SNGL FETUS: CPT | Performed by: OBSTETRICS & GYNECOLOGY

## 2024-04-16 PROCEDURE — 0502F SUBSEQUENT PRENATAL CARE: CPT

## 2024-04-16 PROCEDURE — 76811 OB US DETAILED SNGL FETUS: CPT

## 2024-04-16 RX ORDER — PROMETHAZINE HYDROCHLORIDE 25 MG/1
TABLET ORAL
Qty: 30 TABLET | Refills: 0 | Status: SHIPPED | OUTPATIENT
Start: 2024-04-16

## 2024-04-16 RX ORDER — LANOLIN ALCOHOL/MO/W.PET/CERES
1 CREAM (GRAM) TOPICAL DAILY
COMMUNITY
Start: 2024-04-01 | End: 2024-04-16

## 2024-04-16 NOTE — PROGRESS NOTES
"    Maternal/Fetal Medicine Follow Up Note     Name: Ely De La Garza    : 1985     MRN: [id number]     Referring Provider: Itzel Damon MD    Chief Complaint  AMA-multip  Prior  birth   Placenta previa     Subjective     History of Present Illness:  Ely De La Garza is a 38 y.o.  18w1d who presents today for follow up in the setting of known placenta previa, prior 29 week  birth, prior CS   Overall well   NIPS low risk   Using vaginal progesterone     KYLE: Estimated Date of Delivery: 24     ROS:   As noted in HPI.     Objective     Vital Signs  /63   Wt 85.7 kg (189 lb)   LMP 2023   Estimated body mass index is 30.51 kg/m² as calculated from the following:    Height as of 3/25/24: 167.6 cm (66\").    Weight as of this encounter: 85.7 kg (189 lb).    Ultrasound Impression:   See viewpoint      Assessment and Plan     Ely De La Garza is a 38 y.o.  18w1d     Diagnoses and all orders for this visit:    1. Placenta previa antepartum (Primary)  Assessment & Plan:  Posterior marginal previa   No vaginal bleeding   Bleeding precautions reviewed       2. Previous  section    3. History of  delivery  Assessment & Plan:  Cervical length today appears normal   Reviewed  birth precautions   Recommend cervical length in your office in 2 weeks and follow up cervical length/limited anatomy in 4 weeks with MFM              Follow Up  4 weeks     I spent 10 minutes caring for the patient on the day of service. This included: obtaining or reviewing a separately obtained medical history, reviewing patient records, performing a medically appropriate exam and/or evaluation, counseling or educating the patient/family/caregiver, ordering medications, labs, and/or procedures and documenting such in the medical record. This does not include time spent on review and interpretation of other tests such as fetal ultrasound or the performance of other " procedures such as amniocentesis or CVS.    Taylor Hendricks MD FACOG  Maternal Fetal Medicine, [facility]     2024

## 2024-04-16 NOTE — LETTER
"2024       No Recipients    Patient: Ely De La Garza   YOB: 1985   Date of Visit: 2024       Dear Itzel Damon MD,    Thank you for referring Ely De La Garza to me for evaluation. Below is a copy of my consult note.    If you have questions, please do not hesitate to call me. I look forward to following Ely along with you.         Sincerely,        Taylor Hendricks MD        CC:   No Recipients        Maternal/Fetal Medicine Follow Up Note     Name: Ely De La Garza    : 1985     MRN: [id number]     Referring Provider: Itzel Damon MD    Chief Complaint  AMA-multip  Prior  birth   Placenta previa     Subjective     History of Present Illness:  Ely De La Garza is a 38 y.o.  18w1d who presents today for follow up in the setting of known placenta previa, prior 29 week  birth, prior CS   Overall well   NIPS low risk   Using vaginal progesterone     KYLE: Estimated Date of Delivery: 24     ROS:   As noted in HPI.     Objective     Vital Signs  /63   Wt 85.7 kg (189 lb)   LMP 2023   Estimated body mass index is 30.51 kg/m² as calculated from the following:    Height as of 3/25/24: 167.6 cm (66\").    Weight as of this encounter: 85.7 kg (189 lb).    Ultrasound Impression:   See viewpoint      Assessment and Plan     Ely De La Garza is a 38 y.o.  18w1d     Diagnoses and all orders for this visit:    1. Placenta previa antepartum (Primary)  Assessment & Plan:  Posterior marginal previa   No vaginal bleeding   Bleeding precautions reviewed       2. Previous  section    3. History of  delivery  Assessment & Plan:  Cervical length today appears normal   Reviewed  birth precautions   Recommend cervical length in your office in 2 weeks and follow up cervical length/limited anatomy in 4 weeks with MFM              Follow Up  4 weeks     I spent 10 minutes caring for the patient on the day of " service. This included: obtaining or reviewing a separately obtained medical history, reviewing patient records, performing a medically appropriate exam and/or evaluation, counseling or educating the patient/family/caregiver, ordering medications, labs, and/or procedures and documenting such in the medical record. This does not include time spent on review and interpretation of other tests such as fetal ultrasound or the performance of other procedures such as amniocentesis or CVS.    Taylor Hendricks MD FACOG  Maternal Fetal Medicine, [facility]     2024

## 2024-04-16 NOTE — PROGRESS NOTES
"    OB FOLLOW UP  CC- Here for care of pregnancy        Ely De La Garza is a 38 y.o.  18w1d patient being seen today for her obstetrical follow up visit. Patient reports vomiting daily \"at least once\" and having migraines.  She has appointment to see neuro for her migraines but not until .    Her prenatal care is complicated by (and status) :   Patient Active Problem List   Diagnosis    Depression    Polycystic ovaries    Migraine with aura and without status migrainosus, not intractable    History of sleeve gastrectomy    Pregnancy    Previous  section    Antepartum multigravida of advanced maternal age    History of  delivery    Placenta previa antepartum       Flu Status: Declines  Ultrasound Today: Yes at [facility]  AFP was declined.    ROS -     Patient Denies: leaking of fluid, vaginal bleeding, dysuria, excessive vomiting, and more than 6 contractions per hour  Fetal Movement : Yes  All other systems reviewed and are negative.       The additional following portions of the patient's history were reviewed and updated as appropriate: allergies, current medications, past medical history, past social history, past surgical history, and problem list.      I have reviewed and agree with the HPI, ROS, and historical information as entered above. Tiny Yuan, APRN      /68   Wt 85.7 kg (189 lb)   LMP 2023   BMI 30.51 kg/m²       EXAM:     Prenatal Vitals  BP: 118/68  Weight: 85.7 kg (189 lb)   Fetal Heart Rate: 163          Urine Glucose Read-only: (!) 1+  Urine Protein Read-only: Negative       Assessment and Plan    Problem List Items Addressed This Visit          Gravid and     Previous  section    Overview     ?uterine incision, get op report  Report requested 2024         Antepartum multigravida of advanced maternal age    Overview     cfDNA low risk         Relevant Orders    US Ob Transvaginal    History of  delivery    Placenta previa " antepartum    Overview     4/1/2024- Anterior right lateral placenta appears to be previa, AF normal, cervical length 53.3 mm.  Pelvic rest, no heavy lifting.    4/16/24 [facility] scan: S=D  Normal appearing though somewhat limited anatomic survey  Posterior marginal placenta previa  Normal fluid  TA CL normal (3.7 cms)         Relevant Orders    US Ob Transvaginal       Neuro    Migraine with aura and without status migrainosus, not intractable    Overview     Previously managed with topiramate.  Stopped due to pregnancy. Will begin mag oxide 400 mg daily and Benadryl as needed. Referral to neurology due to limited availability with medications medications secondary to polypharmacy.  Consulted with Dr. Ballesteros.         Relevant Medications    buPROPion XL (WELLBUTRIN XL) 300 MG 24 hr tablet    FLUoxetine (PROzac) 10 MG capsule     Other Visit Diagnoses       Prenatal care, antepartum    -  Primary    Relevant Orders    POC Urinalysis Dipstick (Completed)    Nausea and vomiting, unspecified vomiting type        Relevant Medications    promethazine (PHENERGAN) 25 MG tablet    Marginal placenta previa        Relevant Orders    US Ob Transvaginal            Pregnancy at 18w1d  Anatomy scan today is incomplete, follow up in 4 weeks for additional views. Anatomy that was visualized was within normal limits. and marginal previa per [facility]  Fetal status reassuring.   Patient complains about neurology not seeing her until the end of may for her migraines. She was told it was due to her pregnancy. Having referral and management look into this to see if we can get her scheduled sooner.   Medication(s) Ordered  U/S ordered at follow up  Nausea/Vomiting - desires medication.  Options discussed of Diclegis/Bonjesta, Promethazine, and Zofran  Patient is on Prenatal vitamins  Marginal previa-continue pelvic rest restrict heavy lifting  Follow up in two weeks for cervical length here (per [facility]) then two weeks after go to [facility] for repeat  anatomy to see views not seen today.    Tiny Yaun, APRN  04/16/2024

## 2024-04-16 NOTE — ASSESSMENT & PLAN NOTE
Cervical length today appears normal   Reviewed  birth precautions   Recommend cervical length in your office in 2 weeks and follow up cervical length/limited anatomy in 4 weeks with MFM

## 2024-05-01 ENCOUNTER — ROUTINE PRENATAL (OUTPATIENT)
Dept: OBSTETRICS AND GYNECOLOGY | Facility: CLINIC | Age: 39
End: 2024-05-01
Payer: COMMERCIAL

## 2024-05-01 VITALS — WEIGHT: 190.4 LBS | BODY MASS INDEX: 30.73 KG/M2 | DIASTOLIC BLOOD PRESSURE: 60 MMHG | SYSTOLIC BLOOD PRESSURE: 118 MMHG

## 2024-05-01 DIAGNOSIS — Z98.891 PREVIOUS CESAREAN SECTION: ICD-10-CM

## 2024-05-01 DIAGNOSIS — Z90.3 HISTORY OF SLEEVE GASTRECTOMY: ICD-10-CM

## 2024-05-01 DIAGNOSIS — Z87.51 HISTORY OF PRETERM DELIVERY: ICD-10-CM

## 2024-05-01 DIAGNOSIS — O09.529 ANTEPARTUM MULTIGRAVIDA OF ADVANCED MATERNAL AGE: ICD-10-CM

## 2024-05-01 DIAGNOSIS — Z3A.20 20 WEEKS GESTATION OF PREGNANCY: ICD-10-CM

## 2024-05-01 DIAGNOSIS — Z34.82 MULTIGRAVIDA IN SECOND TRIMESTER: Primary | ICD-10-CM

## 2024-05-01 DIAGNOSIS — O44.00 PLACENTA PREVIA ANTEPARTUM: ICD-10-CM

## 2024-05-01 DIAGNOSIS — F33.42 RECURRENT MAJOR DEPRESSIVE DISORDER, IN FULL REMISSION: ICD-10-CM

## 2024-05-01 LAB
GLUCOSE UR STRIP-MCNC: NEGATIVE MG/DL
PROT UR STRIP-MCNC: NEGATIVE MG/DL

## 2024-05-01 NOTE — PROGRESS NOTES
OB FOLLOW UP  CC- Here for care of pregnancy        Ely De La Garza is a 38 y.o.  20w2d patient being seen today for her obstetrical follow up visit. Patient reports continued migraines once weekly.  She reports minimal relief with magnesium.  Has an appointment with a neurologist 24.     Her prenatal care is complicated by (and status) :   Patient Active Problem List   Diagnosis    Depression    Polycystic ovaries    Migraine with aura and without status migrainosus, not intractable    History of sleeve gastrectomy    Pregnancy    Previous  section    Antepartum multigravida of advanced maternal age    History of  delivery    Placenta previa antepartum       Flu Status: Declines  Ultrasound Today: Yes for cervical length per PDC  AFP was declined.    ROS -     Patient Denies: leaking of fluid, vaginal bleeding, dysuria, excessive vomiting, and more than 6 contractions per hour  Fetal Movement : Yes  All other systems reviewed and are negative.       The additional following portions of the patient's history were reviewed and updated as appropriate: allergies, current medications, past family history, past medical history, past social history, past surgical history, and problem list.      I have reviewed and agree with the HPI, ROS, and historical information as entered above. Itzel Damon MD      /60   Wt 86.4 kg (190 lb 6.4 oz)   LMP 2023   BMI 30.73 kg/m²       EXAM:     Prenatal Vitals  BP: 118/60  Weight: 86.4 kg (190 lb 6.4 oz)   Fetal Heart Rate: +          Urine Glucose Read-only: Negative  Urine Protein Read-only: Negative       Assessment and Plan    Problem List Items Addressed This Visit          Gynecologic and Obstetric Problems    Placenta previa antepartum    Overview     Posterior marginal 20 wks            Other    Depression    Overview     On vraylar, wellbutrin and prozac nob         Relevant Medications    buPROPion XL (WELLBUTRIN XL) 300 MG 24  hr tablet    Vraylar 1.5 MG capsule capsule    FLUoxetine (PROzac) 10 MG capsule    History of sleeve gastrectomy    Overview     >350 lb weight loss, history HTN, DM prior to this, normal since  Baby asa         Pregnancy    Overview     G1- 29 wk c/s, presented with bulging bag, then pprom. Possible incompetent cervix.   Vaginal progesterone started 16 wks, CL q 2 weeks starting 16 wks.          Previous  section    Overview     ?uterine incision, get op report  Report requested 2024         Antepartum multigravida of advanced maternal age    Overview     cfDNA low risk         History of  delivery    Relevant Orders    US Ob Transvaginal     Other Visit Diagnoses       Multigravida in second trimester    -  Primary    Relevant Orders    POC Urinalysis Dipstick (Completed)            Pregnancy at 20w2d  CL today 3.5cm. She is getting q 2 weeks CL. Has FU sched with PDC in 2 weeks   Op report requested again today.   Fetal status reassuring.   Activity and Exercise discussed.  Patient is on Prenatal vitamins  Return in about 1 month (around 2024) for F/U Prenatal, U/S Next Visit.      Itzel Damon MD  2024

## 2024-05-14 ENCOUNTER — OFFICE VISIT (OUTPATIENT)
Dept: OBSTETRICS AND GYNECOLOGY | Facility: HOSPITAL | Age: 39
End: 2024-05-14
Payer: COMMERCIAL

## 2024-05-14 ENCOUNTER — HOSPITAL ENCOUNTER (OUTPATIENT)
Dept: WOMENS IMAGING | Facility: HOSPITAL | Age: 39
Discharge: HOME | End: 2024-05-14
Admitting: OBSTETRICS & GYNECOLOGY
Payer: COMMERCIAL

## 2024-05-14 ENCOUNTER — ROUTINE PRENATAL (OUTPATIENT)
Dept: OBSTETRICS AND GYNECOLOGY | Facility: CLINIC | Age: 39
End: 2024-05-14
Payer: COMMERCIAL

## 2024-05-14 VITALS — SYSTOLIC BLOOD PRESSURE: 122 MMHG | BODY MASS INDEX: 31.96 KG/M2 | WEIGHT: 198 LBS | DIASTOLIC BLOOD PRESSURE: 78 MMHG

## 2024-05-14 VITALS — SYSTOLIC BLOOD PRESSURE: 120 MMHG | DIASTOLIC BLOOD PRESSURE: 67 MMHG | BODY MASS INDEX: 30.86 KG/M2 | WEIGHT: 191.2 LBS

## 2024-05-14 DIAGNOSIS — O44.00 PLACENTA PREVIA ANTEPARTUM: Primary | ICD-10-CM

## 2024-05-14 DIAGNOSIS — O09.529 ANTEPARTUM MULTIGRAVIDA OF ADVANCED MATERNAL AGE: ICD-10-CM

## 2024-05-14 DIAGNOSIS — Z87.51 HISTORY OF PRETERM DELIVERY: ICD-10-CM

## 2024-05-14 DIAGNOSIS — O44.00 PLACENTA PREVIA ANTEPARTUM: ICD-10-CM

## 2024-05-14 DIAGNOSIS — F33.42 RECURRENT MAJOR DEPRESSIVE DISORDER, IN FULL REMISSION: Primary | ICD-10-CM

## 2024-05-14 DIAGNOSIS — Z34.82 PRENATAL CARE, SUBSEQUENT PREGNANCY, SECOND TRIMESTER: ICD-10-CM

## 2024-05-14 DIAGNOSIS — G43.109 MIGRAINE WITH AURA AND WITHOUT STATUS MIGRAINOSUS, NOT INTRACTABLE: ICD-10-CM

## 2024-05-14 DIAGNOSIS — Z98.891 PREVIOUS CESAREAN SECTION: ICD-10-CM

## 2024-05-14 LAB
GLUCOSE UR STRIP-MCNC: NEGATIVE MG/DL
PROT UR STRIP-MCNC: NEGATIVE MG/DL

## 2024-05-14 PROCEDURE — 76816 OB US FOLLOW-UP PER FETUS: CPT

## 2024-05-14 PROCEDURE — 0502F SUBSEQUENT PRENATAL CARE: CPT

## 2024-05-14 NOTE — PROGRESS NOTES
OB FOLLOW UP  CC- Here for care of pregnancy        Ely De La Garza is a 38 y.o.  22w1d patient being seen today for her obstetrical follow up visit. Patient reports vaginal discharge but thinks related to the vaginal progesterone. Patient has repeat anatomy scan at [facility] today. States plan for f/u with them in 6 weeks. Per patient placenta previa on u/s.      Her prenatal care is complicated by (and status) : see below.  Patient Active Problem List   Diagnosis    Depression    Polycystic ovaries    Migraine with aura and without status migrainosus, not intractable    History of sleeve gastrectomy    Pregnancy    Previous  section    Antepartum multigravida of advanced maternal age    History of  delivery    Placenta previa antepartum       Ultrasound Today: yes at [facility]      ROS -     Patient Denies: leaking of fluid, vaginal bleeding, and more than 6 contractions per hour  Fetal Movement : Yes  All other systems reviewed and are negative.       The additional following portions of the patient's history were reviewed and updated as appropriate: allergies and current medications.      I have reviewed and agree with the HPI, ROS, and historical information as entered above. Tiny Yuan, APRN      /78   Wt 89.8 kg (198 lb)   LMP 2023   BMI 31.96 kg/m²       EXAM:     Prenatal Vitals  BP: 122/78  Weight: 89.8 kg (198 lb)   Fetal Heart Rate: PDC          Urine Glucose Read-only: Negative  Urine Protein Read-only: Negative       Assessment and Plan    Problem List Items Addressed This Visit          Gravid and     Placenta previa antepartum    Overview     Posterior marginal 20 wks            Mental Health    Depression - Primary    Overview     On vraylar, wellbutrin and prozac nob         Relevant Medications    buPROPion XL (WELLBUTRIN XL) 300 MG 24 hr tablet    Vraylar 1.5 MG capsule capsule    FLUoxetine (PROzac) 10 MG capsule       Neuro    Migraine with aura  and without status migrainosus, not intractable    Overview     Previously managed with topiramate.  Stopped due to pregnancy. Will begin mag oxide 400 mg daily and Benadryl as needed. Referral to neurology          Relevant Medications    buPROPion XL (WELLBUTRIN XL) 300 MG 24 hr tablet    FLUoxetine (PROzac) 10 MG capsule     Other Visit Diagnoses       Prenatal care, subsequent pregnancy, second trimester        Relevant Orders    POC Urinalysis Dipstick (Completed)            Pregnancy at 22w1d  Anatomy scan today is complete with abnormal findings of partial placenta previa follow up in 4 weeks @ [facility].   Fetal status reassuring. [facility] ultrasound and report pending. Per patient placenta previa still present. Remains on pelvic rest.   Activity and Exercise discussed.  Headaches/migraines-magnesium helping.   Heartburn-patient has been taking pepcid without relief, states protonix used to help her but she failed her drug test due to it (her pcp told her it could be false pos by this medication) ok for patient to take protonix  U/S ordered at follow up  Patient is on Prenatal vitamins  Follow up with Dr. Damon in two week with cervical length.    Tiny Yuan, APRN  05/14/2024

## 2024-05-14 NOTE — PROGRESS NOTES
"    Maternal/Fetal Medicine Follow Up Note     Name: Ely De La Garza    : 1985     MRN: [id number]     Referring Provider: Itzel Damon MD    Chief Complaint  Previa, AMA, Hx of PTD, Hx of gastric bypass     Subjective     History of Present Illness:  Ely De La Garza is a 38 y.o.  22w5d who presents today for follow up in the setting of known placenta previa, AMA, history of PPROM, prior CS   Overall well   No interval issues   NIPS low risk     Maternal/Fetal Medicine Follow Up Note     Name: Ely De La Garza    : 1985     MRN: [id number]     Referring Provider: Itzel Damon MD    Chief Complaint  Previa, AMA, Hx of PTD, Hx of gastric bypass     Subjective     History of Present Illness:  Ely De La Garza is a 38 y.o.  22w5d who presents today for follow up in the setting of prior  birth, prior CS, known placenta previa and AMA   NIPS low risk   No bleeding     KYLE: Estimated Date of Delivery: 24     ROS:   As noted in HPI.     Objective     Vital Signs  /67   Wt 86.7 kg (191 lb 3.2 oz)   LMP 2023   Estimated body mass index is 30.86 kg/m² as calculated from the following:    Height as of 3/25/24: 167.6 cm (66\").    Weight as of this encounter: 86.7 kg (191 lb 3.2 oz).    Ultrasound Impression:   See viewpoint      Assessment and Plan     Ely De La Garza is a 38 y.o.  22w5d     Diagnoses and all orders for this visit:    1. Placenta previa antepartum (Primary)  Assessment & Plan:  Posterior placenta previa   Bleeding precautions reviewed   Follow up 6 weeks for repeat evaluation     Orders:  -     [facility]; Future    2. Antepartum multigravida of advanced maternal age  -     [facility]; Future    3. Previous  section  -     [facility]; Future    4. History of  delivery  -     [facility]; Future         Follow Up  6 " weeks     I spent 10 minutes caring for the patient on the day of service. This included: obtaining or reviewing a separately obtained medical history, reviewing patient records, performing a medically appropriate exam and/or evaluation, counseling or educating the patient/family/caregiver, ordering medications, labs, and/or procedures and documenting such in the medical record. This does not include time spent on review and interpretation of other tests such as fetal ultrasound or the performance of other procedures such as amniocentesis or CVS.    Taylor Hendricks MD FACOG  Maternal Fetal Medicine, [facility]     2024

## 2024-05-14 NOTE — LETTER
"May 18, 2024     Itzel Damon MD  [address] [facility] [address] [place] [id number]    Patient: Ely De La Garza   YOB: 1985   Date of Visit: 2024       Dear Itzel Damon MD,    Thank you for referring Ely De La Garza to me for evaluation. Below is a copy of my consult note.    If you have questions, please do not hesitate to call me. I look forward to following Ely along with you.         Sincerely,        Taylor Hendricks MD        CC: No Recipients        Maternal/Fetal Medicine Follow Up Note     Name: Ely De La Garza    : 1985     MRN: [id number]     Referring Provider: Itzel Damon MD    Chief Complaint  Previa, AMA, Hx of PTD, Hx of gastric bypass     Subjective     History of Present Illness:  Ely De La Garza is a 38 y.o.  22w5d who presents today for follow up in the setting of known placenta previa, AMA, history of PPROM, prior CS   Overall well   No interval issues   NIPS low risk     Maternal/Fetal Medicine Follow Up Note     Name: Ely De La Garza    : 1985     MRN: [id number]     Referring Provider: Itzel Damon MD    Chief Complaint  Previa, AMA, Hx of PTD, Hx of gastric bypass     Subjective     History of Present Illness:  Ely De La Garza is a 38 y.o.  22w5d who presents today for follow up in the setting of prior  birth, prior CS, known placenta previa and AMA   NIPS low risk   No bleeding     KYLE: Estimated Date of Delivery: 24     ROS:   As noted in HPI.     Objective     Vital Signs  /67   Wt 86.7 kg (191 lb 3.2 oz)   LMP 2023   Estimated body mass index is 30.86 kg/m² as calculated from the following:    Height as of 3/25/24: 167.6 cm (66\").    Weight as of this encounter: 86.7 kg (191 lb 3.2 oz).    Ultrasound Impression:   See viewpoint      Assessment and Plan     Ely De La Garza is a 38 y.o.  22w5d     Diagnoses and all orders for this visit:    1. Placenta previa " antepartum (Primary)  Assessment & Plan:  Posterior placenta previa   Bleeding precautions reviewed   Follow up 6 weeks for repeat evaluation     Orders:  -     [facility]; Future    2. Antepartum multigravida of advanced maternal age  -     [facility]; Future    3. Previous  section  -     [facility]; Future    4. History of  delivery  -     [facility]; Future         Follow Up  6 weeks     I spent 10 minutes caring for the patient on the day of service. This included: obtaining or reviewing a separately obtained medical history, reviewing patient records, performing a medically appropriate exam and/or evaluation, counseling or educating the patient/family/caregiver, ordering medications, labs, and/or procedures and documenting such in the medical record. This does not include time spent on review and interpretation of other tests such as fetal ultrasound or the performance of other procedures such as amniocentesis or CVS.    Taylor Hendricks MD FACOG  Maternal Fetal Medicine, [facility]    [facility] [place]     2024

## 2024-05-19 NOTE — ASSESSMENT & PLAN NOTE
Posterior placenta previa   Bleeding precautions reviewed   Follow up 6 weeks for repeat evaluation

## 2024-05-29 ENCOUNTER — ROUTINE PRENATAL (OUTPATIENT)
Dept: OBSTETRICS AND GYNECOLOGY | Facility: CLINIC | Age: 39
End: 2024-05-29
Payer: COMMERCIAL

## 2024-05-29 VITALS — WEIGHT: 195 LBS | SYSTOLIC BLOOD PRESSURE: 128 MMHG | DIASTOLIC BLOOD PRESSURE: 78 MMHG | BODY MASS INDEX: 31.47 KG/M2

## 2024-05-29 DIAGNOSIS — Z34.90 PRENATAL CARE, ANTEPARTUM: Primary | ICD-10-CM

## 2024-05-29 DIAGNOSIS — Z87.51 HISTORY OF PRETERM DELIVERY: ICD-10-CM

## 2024-05-29 DIAGNOSIS — F33.42 RECURRENT MAJOR DEPRESSIVE DISORDER, IN FULL REMISSION: ICD-10-CM

## 2024-05-29 DIAGNOSIS — R11.2 NAUSEA AND VOMITING, UNSPECIFIED VOMITING TYPE: ICD-10-CM

## 2024-05-29 DIAGNOSIS — Z90.3 HISTORY OF SLEEVE GASTRECTOMY: ICD-10-CM

## 2024-05-29 DIAGNOSIS — O44.00 PLACENTA PREVIA ANTEPARTUM: ICD-10-CM

## 2024-05-29 DIAGNOSIS — Z3A.24 24 WEEKS GESTATION OF PREGNANCY: ICD-10-CM

## 2024-05-29 DIAGNOSIS — O09.529 ANTEPARTUM MULTIGRAVIDA OF ADVANCED MATERNAL AGE: ICD-10-CM

## 2024-05-29 DIAGNOSIS — Z98.891 PREVIOUS CESAREAN SECTION: ICD-10-CM

## 2024-05-29 LAB
GLUCOSE UR STRIP-MCNC: ABNORMAL MG/DL
PROT UR STRIP-MCNC: NEGATIVE MG/DL

## 2024-05-29 RX ORDER — PROMETHAZINE HYDROCHLORIDE 25 MG/1
TABLET ORAL
Qty: 30 TABLET | Refills: 0 | Status: SHIPPED | OUTPATIENT
Start: 2024-05-29

## 2024-05-29 NOTE — PROGRESS NOTES
OB FOLLOW UP  CC- Here for care of pregnancy        Ely De La Garza is a 38 y.o.  24w2d patient being seen today for her obstetrical follow up visit. Patient reports she continues to have nausea and vomiting. States she vomited most of the day yesterday. Would like a Phenergan refill.    1+ glucose in urine today, pt states she had a bowl of cereal before visit.    Her prenatal care is complicated by (and status) :   Patient Active Problem List   Diagnosis    Depression    Polycystic ovaries    Migraine with aura and without status migrainosus, not intractable    History of sleeve gastrectomy    Pregnancy    Previous  section    Antepartum multigravida of advanced maternal age    History of  delivery    Placenta previa antepartum         Ultrasound Today: Yes, cervical length 35.0mm  Reviewed 1 hr glucose testing and TDAP next visit.    ROS -   Patient Denies: leaking of fluid, vaginal bleeding, dysuria, and more than 6 contractions per hour  Fetal Movement : normal  All other systems reviewed and are negative.       The additional following portions of the patient's history were reviewed and updated as appropriate: allergies, current medications, past family history, past medical history, past social history, past surgical history, and problem list.      I have reviewed and agree with the HPI, ROS, and historical information as entered above. Itzel Damon MD      /78   Wt 88.5 kg (195 lb)   LMP 2023   BMI 31.47 kg/m²       EXAM:     Prenatal Vitals  BP: 128/78  Weight: 88.5 kg (195 lb)   Fetal Heart Rate: 155               Urine Glucose Read-only: (!) 1+  Urine Protein Read-only: Negative       Assessment and Plan    Problem List Items Addressed This Visit          Gynecologic and Obstetric Problems    Placenta previa antepartum    Overview     Posterior marginal 20 wks, persistent 24 wks.             Other    Depression    Overview     On vraylar, wellbutrin and  prozac nob         Relevant Medications    buPROPion XL (WELLBUTRIN XL) 300 MG 24 hr tablet    Vraylar 1.5 MG capsule capsule    FLUoxetine (PROzac) 10 MG capsule    History of sleeve gastrectomy    Overview     >350 lb weight loss, history HTN, DM prior to this, normal since  Baby asa         Pregnancy    Overview     G1- 29 wk c/s, presented with bulging bag, then pprom. Possible incompetent cervix.   Vaginal progesterone started 16 wks, CL q 2 weeks starting 16 wks.          Previous  section    Overview     LTUI- op report reviewed           Antepartum multigravida of advanced maternal age    Overview     cfDNA low risk         History of  delivery     Other Visit Diagnoses       Prenatal care, antepartum    -  Primary    Relevant Orders    POC Urinalysis Dipstick (Completed)    Nausea and vomiting, unspecified vomiting type        Relevant Medications    promethazine (PHENERGAN) 25 MG tablet            Pregnancy at 24w2d  Fetal status reassuring.  US today, persistent right lateral previa. CL 3.5cm. has FU with PDC in 4 wks.   1 hour gtt, CBC, Antibody screen, TDAP, and RPR next visit. Instructions given  Discussed/encouraged TDAP vaccination after 28 weeks  Activity and Exercise discussed.  Return in about 1 month (around 2024) for F/U Prenatal, and glucola.      Itzel Damon MD  2024

## 2024-06-01 ENCOUNTER — HOSPITAL ENCOUNTER (OUTPATIENT)
Facility: HOSPITAL | Age: 39
Setting detail: OBSERVATION
Discharge: HOME | End: 2024-06-01
Attending: OBSTETRICS & GYNECOLOGY | Admitting: OBSTETRICS & GYNECOLOGY
Payer: COMMERCIAL

## 2024-06-01 VITALS
WEIGHT: 195 LBS | RESPIRATION RATE: 18 BRPM | TEMPERATURE: 98.2 F | BODY MASS INDEX: 31.34 KG/M2 | HEART RATE: 99 BPM | OXYGEN SATURATION: 98 % | SYSTOLIC BLOOD PRESSURE: 127 MMHG | DIASTOLIC BLOOD PRESSURE: 85 MMHG | HEIGHT: 66 IN

## 2024-06-01 PROBLEM — O26.892 PELVIC PAIN DURING PREGNANCY IN SECOND TRIMESTER, ANTEPARTUM: Status: ACTIVE | Noted: 2024-06-01

## 2024-06-01 PROBLEM — R10.2 PELVIC PAIN DURING PREGNANCY IN SECOND TRIMESTER, ANTEPARTUM: Status: ACTIVE | Noted: 2024-06-01

## 2024-06-01 LAB
BILIRUB UR QL STRIP: NEGATIVE
CLARITY UR: CLEAR
COLOR UR: YELLOW
GLUCOSE UR STRIP-MCNC: NEGATIVE MG/DL
HGB UR QL STRIP.AUTO: NEGATIVE
KETONES UR QL STRIP: NEGATIVE
LEUKOCYTE ESTERASE UR QL STRIP.AUTO: NEGATIVE
NITRITE UR QL STRIP: NEGATIVE
PH UR STRIP.AUTO: 5.5 [PH] (ref 5–8)
PROT UR QL STRIP: NEGATIVE
SP GR UR STRIP: 1.01 (ref 1–1.03)
UROBILINOGEN UR QL STRIP: NORMAL

## 2024-06-01 PROCEDURE — 59025 FETAL NON-STRESS TEST: CPT | Performed by: OBSTETRICS & GYNECOLOGY

## 2024-06-01 PROCEDURE — 59025 FETAL NON-STRESS TEST: CPT

## 2024-06-01 PROCEDURE — G0378 HOSPITAL OBSERVATION PER HR: HCPCS

## 2024-06-01 PROCEDURE — 25010000002 TERBUTALINE PER 1 MG: Performed by: OBSTETRICS & GYNECOLOGY

## 2024-06-01 PROCEDURE — 81003 URINALYSIS AUTO W/O SCOPE: CPT | Performed by: OBSTETRICS & GYNECOLOGY

## 2024-06-01 PROCEDURE — 99222 1ST HOSP IP/OBS MODERATE 55: CPT | Performed by: OBSTETRICS & GYNECOLOGY

## 2024-06-01 PROCEDURE — G0463 HOSPITAL OUTPT CLINIC VISIT: HCPCS

## 2024-06-01 PROCEDURE — 96372 THER/PROPH/DIAG INJ SC/IM: CPT

## 2024-06-01 RX ORDER — ACETAMINOPHEN 500 MG
1000 TABLET ORAL ONCE
Status: COMPLETED | OUTPATIENT
Start: 2024-06-01 | End: 2024-06-01

## 2024-06-01 RX ORDER — TERBUTALINE SULFATE 1 MG/ML
0.25 INJECTION, SOLUTION SUBCUTANEOUS ONCE
Status: COMPLETED | OUTPATIENT
Start: 2024-06-01 | End: 2024-06-01

## 2024-06-01 RX ORDER — CYCLOBENZAPRINE HCL 10 MG
10 TABLET ORAL ONCE
Status: COMPLETED | OUTPATIENT
Start: 2024-06-01 | End: 2024-06-01

## 2024-06-01 RX ADMIN — ACETAMINOPHEN 1000 MG: 500 TABLET ORAL at 04:23

## 2024-06-01 RX ADMIN — TERBUTALINE SULFATE 0.25 MG: 1 INJECTION SUBCUTANEOUS at 04:23

## 2024-06-01 RX ADMIN — CYCLOBENZAPRINE 10 MG: 10 TABLET, FILM COATED ORAL at 04:23

## 2024-06-01 NOTE — H&P
Ely De La Garza  1985  [id number]  [id number]    CC: lower abd and low back pain  HPI:  Patient is 38 y.o. female   currently at 24w5d presents with c/o pain in lower abd (pelvic region) and low back pain.  Pain woke her up at ~0130.  Lower back pain is constant, pelvic pain is intermittent.  Pt did have assoc N, V (X1), no diarrhea.  Pt denies vag bleeding or leaking.  Baby is moving currently.  PNC comp by prev , hx  delivery (29 weeks), and currently has placenta previa.  Last US on  showed cervical length 3.5cm.  pt's nausea is better now.    PMH:  Current meds: PNV, wellbutrin 300mg/d, prozac 10mg/d, vraylar 1.5mg/d, folate,   Liszess, mag oxide, P4 suppos, phenergan 25mg prn  Illnesses: depression, IBS-C, goiter (normal TFT's), GERD, PCOS  Surgeries: , gastric sleeve, left hip replacement, oral surg, T and A, upper   And lower body lift, thigh lift, breast aug  Allergies: NKDA    Past OB History:       OB History    Para Term  AB Living   2 1 0 1 0 1   SAB IAB Ectopic Molar Multiple Live Births   0 0 0 0 0 1      # Outcome Date GA Lbr Clem/2nd Weight Sex Type Anes PTL Lv   2 Current            1  09 29w0d  1531 g (3 lb 6 oz) M CS-Unspec Spinal  JOSE FRANCISCO      Birth Comments: Had bulging membranes and was 6 cm after being in hospital for 3 days      Complications: Incompetent cervix in pregnancy, antepartum, third trimester, PROM (premature rupture of membranes)      Obstetric Comments   Fob #1 - Pregnancy #1 and #2       Pregnancy #1 - Pt reports she was in the hospital for 3 days on bedrest, then had PROM, was 6cm at that time             SH: tob neg , EtOH neg, drugs neg      General ROS: HA (resolved), pelvic pain, low back pain, N, V, dysuria.   All other systems reviewed and are negative.      Physical Examination: General appearance - alert, well appearing, and in no distress  Vital signs - /85 (BP Location: Left arm, Patient  "Position: Lying)   Pulse 99   Temp 98.2 °F (36.8 °C) (Oral)   Resp 18   Ht 167.6 cm (66\")   Wt 88.5 kg (195 lb)   LMP 2023   SpO2 98%   BMI 31.47 kg/m²   HEENT: normocephalic, atraumatic,oropharynx clear, appearance of ears and nose normal  Neck - supple, no significant adenopathy, no thyromegaly  Lymphatics - no palpable lymphadenopathy in the neck or groin, no hepatosplenomegaly  Chest - clear to auscultation, no wheezes, rales or rhonchi, respiratory effort non-labored  Heart - normal rate, regular rhythm, no murmurs, rubs, clicks or gallops, no JVD, no lower extremity edema  Abdomen - soft, no apprec tenderness, nondistended, no masses, no hepatosplenomegaly, no rebound tenderness noted, bowel sounds normal  Vaginal Exam: cl/50%/-1, no blood in vault,external genitalia normal  Extremities - no pedal edema noted, no calf tend  Skin -warm and dry, normal coloration and turgor, no rashes, no suspicious skin lesions noted    Fetal monitoring: indication pelvic pain, onset 0344, offset 0427, baseline 145, mod BTB variability, multiple accels (15 X 15), no decels, no detectable contractions, interpretation reactive NST    Radiology     Assessment 1)IUP 24 5/7 weeks   2)low back and pelvic pain- ccUA neg, no detectable contractions.  Fetal     Head is low, but cervix is not dilated or effaced   3)hx  delivery and prev    4)adv mat age   5)placenta previa- no bleeding currently, pt on pelvic rest    Plan 1)observe   2)terbutaline (diagnostic) once   3)Tyl 1000mg plus flexeril 10mg   4)home when pain improves    Abelino Bishop MD  2024  04:20 EDT                                                                     "

## 2024-06-25 ENCOUNTER — OFFICE VISIT (OUTPATIENT)
Dept: OBSTETRICS AND GYNECOLOGY | Facility: HOSPITAL | Age: 39
End: 2024-06-25
Payer: COMMERCIAL

## 2024-06-25 ENCOUNTER — ROUTINE PRENATAL (OUTPATIENT)
Dept: OBSTETRICS AND GYNECOLOGY | Facility: CLINIC | Age: 39
End: 2024-06-25
Payer: COMMERCIAL

## 2024-06-25 ENCOUNTER — HOSPITAL ENCOUNTER (OUTPATIENT)
Dept: WOMENS IMAGING | Facility: HOSPITAL | Age: 39
Discharge: HOME | End: 2024-06-25
Admitting: OBSTETRICS & GYNECOLOGY
Payer: COMMERCIAL

## 2024-06-25 ENCOUNTER — TELEPHONE (OUTPATIENT)
Dept: NEUROLOGY | Facility: CLINIC | Age: 39
End: 2024-06-25
Payer: COMMERCIAL

## 2024-06-25 VITALS — WEIGHT: 207 LBS | DIASTOLIC BLOOD PRESSURE: 61 MMHG | SYSTOLIC BLOOD PRESSURE: 110 MMHG | BODY MASS INDEX: 33.41 KG/M2

## 2024-06-25 VITALS — BODY MASS INDEX: 33.51 KG/M2 | WEIGHT: 207.6 LBS | DIASTOLIC BLOOD PRESSURE: 76 MMHG | SYSTOLIC BLOOD PRESSURE: 114 MMHG

## 2024-06-25 DIAGNOSIS — Z87.51 HISTORY OF PRETERM DELIVERY: ICD-10-CM

## 2024-06-25 DIAGNOSIS — Z98.891 PREVIOUS CESAREAN SECTION: ICD-10-CM

## 2024-06-25 DIAGNOSIS — F33.42 RECURRENT MAJOR DEPRESSIVE DISORDER, IN FULL REMISSION: Primary | ICD-10-CM

## 2024-06-25 DIAGNOSIS — O09.529 ANTEPARTUM MULTIGRAVIDA OF ADVANCED MATERNAL AGE: ICD-10-CM

## 2024-06-25 DIAGNOSIS — O44.00 PLACENTA PREVIA ANTEPARTUM: ICD-10-CM

## 2024-06-25 DIAGNOSIS — Z28.39 RUBELLA NON-IMMUNE STATUS, ANTEPARTUM: ICD-10-CM

## 2024-06-25 DIAGNOSIS — O44.00 PLACENTA PREVIA ANTEPARTUM: Primary | ICD-10-CM

## 2024-06-25 DIAGNOSIS — Z3A.28 28 WEEKS GESTATION OF PREGNANCY: ICD-10-CM

## 2024-06-25 DIAGNOSIS — Z34.93 THIRD TRIMESTER PREGNANCY: ICD-10-CM

## 2024-06-25 DIAGNOSIS — G43.109 MIGRAINE WITH AURA AND WITHOUT STATUS MIGRAINOSUS, NOT INTRACTABLE: ICD-10-CM

## 2024-06-25 DIAGNOSIS — O09.899 RUBELLA NON-IMMUNE STATUS, ANTEPARTUM: ICD-10-CM

## 2024-06-25 LAB
GLUCOSE UR STRIP-MCNC: NEGATIVE MG/DL
PROT UR STRIP-MCNC: NEGATIVE MG/DL

## 2024-06-25 PROCEDURE — 76816 OB US FOLLOW-UP PER FETUS: CPT | Performed by: OBSTETRICS & GYNECOLOGY

## 2024-06-25 PROCEDURE — 90471 IMMUNIZATION ADMIN: CPT | Performed by: NURSE PRACTITIONER

## 2024-06-25 PROCEDURE — 90715 TDAP VACCINE 7 YRS/> IM: CPT | Performed by: NURSE PRACTITIONER

## 2024-06-25 PROCEDURE — 99213 OFFICE O/P EST LOW 20 MIN: CPT | Performed by: OBSTETRICS & GYNECOLOGY

## 2024-06-25 PROCEDURE — 0502F SUBSEQUENT PRENATAL CARE: CPT | Performed by: NURSE PRACTITIONER

## 2024-06-25 PROCEDURE — 76816 OB US FOLLOW-UP PER FETUS: CPT

## 2024-06-25 NOTE — PROGRESS NOTES
Patient denies bleeding, leaking fluid or contractions  NIPT negative  Patients next follow with Dr. Damon's office is today

## 2024-06-25 NOTE — PROGRESS NOTES
"    Maternal/Fetal Medicine Consult Note   Date: 2024  Name: Ely De La Garza    : 1985     MRN: [id number]     Referring Provider: Itzel Damon MD    Chief Complaint  AMA, previa    Subjective     History of Present Illness:  Ely De La Garza is a 38 y.o.  28w1d who presents today for AMA and previa    KYLE: Estimated Date of Delivery: 24     ROS:   Otherwise Noted in HPI      Current Outpatient Medications:     buPROPion XL (WELLBUTRIN XL) 300 MG 24 hr tablet, , Disp: , Rfl:     CHOLINE PO, Take  by mouth., Disp: , Rfl:     FLUoxetine (PROzac) 10 MG capsule, Take 1 capsule by mouth Daily., Disp: , Rfl:     folic acid (FOLVITE) 1 MG tablet, Take 1 tablet by mouth Daily., Disp: , Rfl:     linaclotide (Linzess) 145 MCG capsule capsule, Take 1 capsule by mouth Every Morning Before Breakfast., Disp: 90 capsule, Rfl: 1    magnesium oxide (MAG-OX) 400 MG tablet, Take 1 tablet by mouth Daily., Disp: 30 tablet, Rfl: 3    prenatal vitamin (prenatal, CLASSIC, vitamin) tablet, Take  by mouth Daily., Disp: , Rfl:     Progesterone (Prometrium) 100 MG capsule, Insert 2 capsules into the vagina every night at bedtime for 150 days., Disp: 60 capsule, Rfl: 4    promethazine (PHENERGAN) 25 MG tablet, 1/2 to 1 tablet orally every 6 hours as needed for nausea, caution sedation, Disp: 30 tablet, Rfl: 0    Vraylar 1.5 MG capsule capsule, Take 1 capsule by mouth Daily., Disp: , Rfl:     Objective     Vital Signs  /61   Wt 93.9 kg (207 lb)   LMP 2023   Estimated body mass index is 33.41 kg/m² as calculated from the following:    Height as of 24: 167.6 cm (66\").    Weight as of this encounter: 93.9 kg (207 lb).    Ultrasound Impression:   See Viewpoint     Assessment and Plan     Ely De La Garza is a 38 y.o.  28w1d who presents today for AMA and previa    Diagnoses and all orders for this visit:    1. Placenta previa antepartum (Primary)  Assessment & Plan:  Placenta appears " more right-sided/lateral today but still is a previa.  We discussed continued pelvic rest and careful monitoring of signs and symptoms of  labor and vaginal bleeding.  We will reevaluate in 4 weeks.  We discussed that if placenta continues move patient may be a candidate for vaginal delivery after  if her or primary OB/GYN desires.           Follow Up  Follow-up in 4 weeks    I spent 10 minutes caring for the patient on the day of service. This included: obtaining or reviewing a separately obtained medical history, reviewing patient records, performing a medically appropriate exam and/or evaluation, counseling or educating the patient/family/caregiver, ordering medications, labs, and/or procedures and documenting such in the medical record. This does not include time spent on review and interpretation of other tests such as fetal ultrasound or the performance of other procedures such as amniocentesis or CVS.      Tyler Hastings MD, FACOG  Maternal Fetal Medicine, [facility] [place]

## 2024-06-25 NOTE — TELEPHONE ENCOUNTER
----- Message from [facility] [employer] sent at 6/25/2024  1:48 PM EDT -----  Regarding: Appointment Cancellation Request  Contact: [phone]  Ely De La Garza would like to cancel the following appointments:    Paul Casey in [facility] ([phone]), 7/2/2024  9:00 AM    Comments:  Migraines are doing well with magnesium.

## 2024-06-25 NOTE — ASSESSMENT & PLAN NOTE
Placenta appears more right-sided/lateral today but still is a previa.  We discussed continued pelvic rest and careful monitoring of signs and symptoms of  labor and vaginal bleeding.  We will reevaluate in 4 weeks.  We discussed that if placenta continues move patient may be a candidate for vaginal delivery after  if her or primary OB/GYN desires.

## 2024-06-25 NOTE — PROGRESS NOTES
OB FOLLOW UP  CC- Here for care of pregnancy        Ely De La Garza is a 38 y.o.  28w1d patient being seen today for her obstetrical follow up. Patient reports N/V. Vomiting twice a day and moderate cramping.     Patient undergoing Glucola testing today. She is due for her testing at 10:45am.       MBT: A+  Rhogam: is not indicated.  28 week packet: reviewed with patient , counseled on fetal movement , pediatrician list reviewed, breast pump discussed, and childbirth classes reviewed  TDAP: given today  Ultrasound Today: Yes with PDC. Adequate fetal growth. Normal LANNY. Placenta still appears to be previa though appears right lateral     Her prenatal care is complicated by (and status) : see below.  Patient Active Problem List   Diagnosis    Depression    Polycystic ovaries    Migraine with aura and without status migrainosus, not intractable    History of sleeve gastrectomy    Pregnancy    Previous  section    Antepartum multigravida of advanced maternal age    History of  delivery    Placenta previa antepartum    Pelvic pain during pregnancy in second trimester, antepartum    Rubella non-immune status, antepartum       ROS -   Patient Denies: Loss of Fluid, Vaginal Spotting, Vision Changes, Headaches, Contractions, Epigastric pain, and skin itching  Fetal Movement : normal    The additional following portions of the patient's history were reviewed and updated as appropriate: allergies and current medications.    I have reviewed and agree with the HPI, ROS, and historical information as entered above. Neftali Crenshaw, APRN      /76   Wt 94.2 kg (207 lb 9.6 oz)   LMP 2023   BMI 33.51 kg/m²       EXAM:     Prenatal Vitals  BP: 114/76  Weight: 94.2 kg (207 lb 9.6 oz)   Fetal Heart Rate: PDC      Assessment and Plan    Problem List Items Addressed This Visit       Depression - Primary    Overview     On vraylar, wellbutrin and prozac nob         Relevant Medications     buPROPion XL (WELLBUTRIN XL) 300 MG 24 hr tablet    Vraylar 1.5 MG capsule capsule    FLUoxetine (PROzac) 10 MG capsule    Migraine with aura and without status migrainosus, not intractable    Overview     Previously managed with topiramate.  Stopped due to pregnancy. Will begin mag oxide 400 mg daily and Benadryl as needed. Referral to neurology          Relevant Medications    buPROPion XL (WELLBUTRIN XL) 300 MG 24 hr tablet    FLUoxetine (PROzac) 10 MG capsule    Pregnancy    Overview     G1- 29 wk c/s, presented with bulging bag, then pprom. Possible incompetent cervix.   Vaginal progesterone started 16 wks, CL q 2 weeks starting 16 wks.          Previous  section    Overview     LTUI- op report reviewed           Antepartum multigravida of advanced maternal age    Overview     cfDNA low risk         History of  delivery    Placenta previa antepartum    Overview     Posterior marginal 20 wks, persistent 24 wks.          Rubella non-immune status, antepartum     Other Visit Diagnoses       Third trimester pregnancy        Relevant Orders    POC Urinalysis Dipstick    CBC (No Diff)    Gestational Screen 1 Hr ([facility])    Antibody Screen    RPR    Tdap Vaccine Greater Than or Equal To 6yo IM (Completed)            Pregnancy at 28w1d  Fetal status reassuring. PDC today. Adequate fetal growth. Normal LANNY. Placenta still appears to be previa though appears right lateral.  1 hr Glucola, CBC, RPR. Antibody screen and TDAP today  Fetal movement/PTL or Labor precautions  Lab(s) Ordered  Patient is on Prenatal vitamins  Reviewed Pre-eclampsia signs/symptoms  Activity and Exercise discussed.  Return in about 4 weeks (around 2024) for after PDC appt.        Neftali Crenshaw, APRN  2024

## 2024-06-26 LAB
BLD GP AB SCN SERPL QL: NEGATIVE
ERYTHROCYTE [DISTWIDTH] IN BLOOD BY AUTOMATED COUNT: 12 % (ref 12.3–15.4)
GLUCOSE 1H P 50 G GLC PO SERPL-MCNC: 207 MG/DL (ref 65–139)
HCT VFR BLD AUTO: 32.7 % (ref 34–46.6)
HGB BLD-MCNC: 10.9 G/DL (ref 12–15.9)
MCH RBC QN AUTO: 30.8 PG (ref 26.6–33)
MCHC RBC AUTO-ENTMCNC: 33.3 G/DL (ref 31.5–35.7)
MCV RBC AUTO: 92.4 FL (ref 79–97)
PLATELET # BLD AUTO: 140 10*3/MM3 (ref 140–450)
RBC # BLD AUTO: 3.54 10*6/MM3 (ref 3.77–5.28)
RPR SER QL: NON REACTIVE
WBC # BLD AUTO: 7.07 10*3/MM3 (ref 3.4–10.8)

## 2024-06-27 ENCOUNTER — TELEPHONE (OUTPATIENT)
Dept: OBSTETRICS AND GYNECOLOGY | Facility: CLINIC | Age: 39
End: 2024-06-27
Payer: COMMERCIAL

## 2024-06-27 NOTE — TELEPHONE ENCOUNTER
Patient called in regards to 28 week lab results.  Reviewed labs and need for 3 hr with patient, as well as instructions for completion.  Patient will come to [facility] office on Monday to complete.  Patient verified and voiced understanding.

## 2024-07-01 ENCOUNTER — TELEPHONE (OUTPATIENT)
Dept: OBSTETRICS AND GYNECOLOGY | Facility: CLINIC | Age: 39
End: 2024-07-01
Payer: COMMERCIAL

## 2024-07-01 ENCOUNTER — LAB (OUTPATIENT)
Dept: OBSTETRICS AND GYNECOLOGY | Facility: CLINIC | Age: 39
End: 2024-07-01
Payer: COMMERCIAL

## 2024-07-01 DIAGNOSIS — Z13.1 ENCOUNTER FOR SCREENING FOR DIABETES MELLITUS: Primary | ICD-10-CM

## 2024-07-01 DIAGNOSIS — Z34.93 PRENATAL CARE IN THIRD TRIMESTER: ICD-10-CM

## 2024-07-01 DIAGNOSIS — G43.109 MIGRAINE WITH AURA AND WITHOUT STATUS MIGRAINOSUS, NOT INTRACTABLE: Primary | ICD-10-CM

## 2024-07-01 DIAGNOSIS — O44.00 PLACENTA PREVIA ANTEPARTUM: ICD-10-CM

## 2024-07-01 RX ORDER — BLOOD-GLUCOSE METER
KIT MISCELLANEOUS
Qty: 1 EACH | Refills: 0 | Status: SHIPPED | OUTPATIENT
Start: 2024-07-01

## 2024-07-01 RX ORDER — LANCETS 28 GAUGE
EACH MISCELLANEOUS
Qty: 120 EACH | Refills: 4 | Status: SHIPPED | OUTPATIENT
Start: 2024-07-01

## 2024-07-01 NOTE — TELEPHONE ENCOUNTER
KS patient- 29 0/7    Patient in office today for 3 hour GTT.  Patient vomited following completion of drinking glucola.  Reviewed with patient that we will now treat as gestational diabetic.  Advised patient that testing supplies would be sent to pharmacy on file, and would place a referral for diabetic education.  Reviewed need to check BS fasting, and two hour post prandial and keep log.  Patient verified and voiced understanding.     Supplies sent and referral placed.

## 2024-07-15 ENCOUNTER — TELEPHONE (OUTPATIENT)
Dept: OBSTETRICS AND GYNECOLOGY | Facility: CLINIC | Age: 39
End: 2024-07-15
Payer: COMMERCIAL

## 2024-07-15 NOTE — TELEPHONE ENCOUNTER
Spoke with patient about scheduling diabetes education classes. She states she has not received any missed calls and thinks she has the number blocked. She is asking for the phone number, so she can call and schedule her class. Pt states she has been monitoring her FSBS. Average fasting has been 88 and 2h PP . Requested telephone number from diabetes education .

## 2024-07-21 ENCOUNTER — HOSPITAL ENCOUNTER (INPATIENT)
Facility: HOSPITAL | Age: 39
LOS: 2 days | Discharge: HOME | End: 2024-07-23
Attending: OBSTETRICS & GYNECOLOGY | Admitting: OBSTETRICS & GYNECOLOGY
Payer: COMMERCIAL

## 2024-07-21 PROBLEM — O60.03 PRETERM LABOR IN THIRD TRIMESTER WITHOUT DELIVERY: Status: ACTIVE | Noted: 2024-07-21

## 2024-07-21 LAB
ABO GROUP BLD: NORMAL
ABO GROUP BLD: NORMAL
ALBUMIN SERPL-MCNC: 3.3 G/DL (ref 3.5–5.2)
ALBUMIN/GLOB SERPL: 1.2 G/DL
ALP SERPL-CCNC: 103 U/L (ref 39–117)
ALT SERPL W P-5'-P-CCNC: 8 U/L (ref 1–33)
ANION GAP SERPL CALCULATED.3IONS-SCNC: 10 MMOL/L (ref 5–15)
AST SERPL-CCNC: 12 U/L (ref 1–32)
BILIRUB SERPL-MCNC: 0.3 MG/DL (ref 0–1.2)
BILIRUB UR QL STRIP: NEGATIVE
BLD GP AB SCN SERPL QL: NEGATIVE
BUN SERPL-MCNC: 4 MG/DL (ref 6–20)
BUN/CREAT SERPL: 8.3 (ref 7–25)
CALCIUM SPEC-SCNC: 8.1 MG/DL (ref 8.6–10.5)
CHLORIDE SERPL-SCNC: 105 MMOL/L (ref 98–107)
CLARITY UR: CLEAR
CO2 SERPL-SCNC: 22 MMOL/L (ref 22–29)
COLOR UR: YELLOW
CREAT SERPL-MCNC: 0.48 MG/DL (ref 0.57–1)
DEPRECATED RDW RBC AUTO: 36.8 FL (ref 37–54)
EGFRCR SERPLBLD CKD-EPI 2021: 123.7 ML/MIN/1.73
ERYTHROCYTE [DISTWIDTH] IN BLOOD BY AUTOMATED COUNT: 12.1 % (ref 12.3–15.4)
GLOBULIN UR ELPH-MCNC: 2.8 GM/DL
GLUCOSE BLDC GLUCOMTR-MCNC: 154 MG/DL (ref 70–130)
GLUCOSE BLDC GLUCOMTR-MCNC: 180 MG/DL (ref 70–130)
GLUCOSE BLDC GLUCOMTR-MCNC: 195 MG/DL (ref 70–130)
GLUCOSE BLDC GLUCOMTR-MCNC: 239 MG/DL (ref 70–130)
GLUCOSE BLDC GLUCOMTR-MCNC: 256 MG/DL (ref 70–130)
GLUCOSE SERPL-MCNC: 103 MG/DL (ref 65–99)
GLUCOSE UR STRIP-MCNC: NEGATIVE MG/DL
HCT VFR BLD AUTO: 30.3 % (ref 34–46.6)
HGB BLD-MCNC: 10.6 G/DL (ref 12–15.9)
HGB UR QL STRIP.AUTO: NEGATIVE
KETONES UR QL STRIP: NEGATIVE
LEUKOCYTE ESTERASE UR QL STRIP.AUTO: NEGATIVE
MCH RBC QN AUTO: 29.7 PG (ref 26.6–33)
MCHC RBC AUTO-ENTMCNC: 35 G/DL (ref 31.5–35.7)
MCV RBC AUTO: 84.9 FL (ref 79–97)
NITRITE UR QL STRIP: NEGATIVE
PH UR STRIP.AUTO: 6 [PH] (ref 5–8)
PLATELET # BLD AUTO: 137 10*3/MM3 (ref 140–450)
PMV BLD AUTO: 9.7 FL (ref 6–12)
POTASSIUM SERPL-SCNC: 3.9 MMOL/L (ref 3.5–5.2)
PROT SERPL-MCNC: 6.1 G/DL (ref 6–8.5)
PROT UR QL STRIP: NEGATIVE
RBC # BLD AUTO: 3.57 10*6/MM3 (ref 3.77–5.28)
RH BLD: POSITIVE
RH BLD: POSITIVE
SODIUM SERPL-SCNC: 137 MMOL/L (ref 136–145)
SP GR UR STRIP: <=1.005 (ref 1–1.03)
T&S EXPIRATION DATE: NORMAL
UROBILINOGEN UR QL STRIP: NORMAL
WBC NRBC COR # BLD AUTO: 9.81 10*3/MM3 (ref 3.4–10.8)

## 2024-07-21 PROCEDURE — 86900 BLOOD TYPING SEROLOGIC ABO: CPT | Performed by: OBSTETRICS & GYNECOLOGY

## 2024-07-21 PROCEDURE — 82948 REAGENT STRIP/BLOOD GLUCOSE: CPT

## 2024-07-21 PROCEDURE — 86850 RBC ANTIBODY SCREEN: CPT | Performed by: OBSTETRICS & GYNECOLOGY

## 2024-07-21 PROCEDURE — 81003 URINALYSIS AUTO W/O SCOPE: CPT | Performed by: OBSTETRICS & GYNECOLOGY

## 2024-07-21 PROCEDURE — 25010000002 MAGNESIUM SULFATE 20 GM/500ML SOLUTION: Performed by: OBSTETRICS & GYNECOLOGY

## 2024-07-21 PROCEDURE — 85027 COMPLETE CBC AUTOMATED: CPT | Performed by: OBSTETRICS & GYNECOLOGY

## 2024-07-21 PROCEDURE — 86901 BLOOD TYPING SEROLOGIC RH(D): CPT | Performed by: OBSTETRICS & GYNECOLOGY

## 2024-07-21 PROCEDURE — 25010000002 ONDANSETRON PER 1 MG: Performed by: OBSTETRICS & GYNECOLOGY

## 2024-07-21 PROCEDURE — 80053 COMPREHEN METABOLIC PANEL: CPT | Performed by: OBSTETRICS & GYNECOLOGY

## 2024-07-21 PROCEDURE — 86901 BLOOD TYPING SEROLOGIC RH(D): CPT

## 2024-07-21 PROCEDURE — 25010000002 BETAMETHASONE ACET & SOD PHOS PER 4 MG: Performed by: OBSTETRICS & GYNECOLOGY

## 2024-07-21 PROCEDURE — 59025 FETAL NON-STRESS TEST: CPT | Performed by: OBSTETRICS & GYNECOLOGY

## 2024-07-21 PROCEDURE — 25010000002 AMPICILLIN PER 500 MG: Performed by: OBSTETRICS & GYNECOLOGY

## 2024-07-21 PROCEDURE — 63710000001 INSULIN LISPRO (HUMAN) PER 5 UNITS: Performed by: OBSTETRICS & GYNECOLOGY

## 2024-07-21 PROCEDURE — 99223 1ST HOSP IP/OBS HIGH 75: CPT | Performed by: OBSTETRICS & GYNECOLOGY

## 2024-07-21 PROCEDURE — 86900 BLOOD TYPING SEROLOGIC ABO: CPT

## 2024-07-21 PROCEDURE — 59025 FETAL NON-STRESS TEST: CPT

## 2024-07-21 PROCEDURE — 25810000003 DEXTROSE 5% IN LACTATED RINGERS PER 1000 ML: Performed by: OBSTETRICS & GYNECOLOGY

## 2024-07-21 PROCEDURE — 36415 COLL VENOUS BLD VENIPUNCTURE: CPT | Performed by: OBSTETRICS & GYNECOLOGY

## 2024-07-21 RX ORDER — DEXTROSE AND SODIUM CHLORIDE 5; .2 G/100ML; G/100ML
75 INJECTION, SOLUTION INTRAVENOUS CONTINUOUS
Status: DISCONTINUED | OUTPATIENT
Start: 2024-07-21 | End: 2024-07-22

## 2024-07-21 RX ORDER — INSULIN LISPRO 100 [IU]/ML
2-9 INJECTION, SOLUTION INTRAVENOUS; SUBCUTANEOUS 3 TIMES DAILY
Status: DISCONTINUED | OUTPATIENT
Start: 2024-07-21 | End: 2024-07-23 | Stop reason: HOSPADM

## 2024-07-21 RX ORDER — CALCIUM CARBONATE 500 MG/1
2 TABLET, CHEWABLE ORAL 3 TIMES DAILY PRN
Status: DISCONTINUED | OUTPATIENT
Start: 2024-07-21 | End: 2024-07-23 | Stop reason: HOSPADM

## 2024-07-21 RX ORDER — DOCUSATE SODIUM 100 MG/1
100 CAPSULE, LIQUID FILLED ORAL 2 TIMES DAILY
Status: DISCONTINUED | OUTPATIENT
Start: 2024-07-21 | End: 2024-07-23 | Stop reason: HOSPADM

## 2024-07-21 RX ORDER — ACETAMINOPHEN 500 MG
1000 TABLET ORAL EVERY 4 HOURS PRN
Status: DISCONTINUED | OUTPATIENT
Start: 2024-07-21 | End: 2024-07-23 | Stop reason: HOSPADM

## 2024-07-21 RX ORDER — ACETAMINOPHEN 500 MG
1000 TABLET ORAL EVERY 6 HOURS PRN
COMMUNITY

## 2024-07-21 RX ORDER — CITRIC ACID/SODIUM CITRATE 334-500MG
30 SOLUTION, ORAL ORAL ONCE
Status: COMPLETED | OUTPATIENT
Start: 2024-07-21 | End: 2024-07-21

## 2024-07-21 RX ORDER — MAGNESIUM SULFATE HEPTAHYDRATE 40 MG/ML
2 INJECTION, SOLUTION INTRAVENOUS CONTINUOUS
Status: DISCONTINUED | OUTPATIENT
Start: 2024-07-21 | End: 2024-07-23 | Stop reason: HOSPADM

## 2024-07-21 RX ORDER — DOCUSATE SODIUM 100 MG/1
100 CAPSULE, LIQUID FILLED ORAL 2 TIMES DAILY PRN
Status: DISCONTINUED | OUTPATIENT
Start: 2024-07-21 | End: 2024-07-23 | Stop reason: HOSPADM

## 2024-07-21 RX ORDER — ONDANSETRON 2 MG/ML
4 INJECTION INTRAMUSCULAR; INTRAVENOUS EVERY 8 HOURS PRN
Status: DISCONTINUED | OUTPATIENT
Start: 2024-07-21 | End: 2024-07-23 | Stop reason: HOSPADM

## 2024-07-21 RX ORDER — SODIUM CHLORIDE 0.9 % (FLUSH) 0.9 %
10 SYRINGE (ML) INJECTION AS NEEDED
Status: DISCONTINUED | OUTPATIENT
Start: 2024-07-21 | End: 2024-07-23 | Stop reason: HOSPADM

## 2024-07-21 RX ORDER — FLUOXETINE 10 MG/1
10 CAPSULE ORAL DAILY
Status: DISCONTINUED | OUTPATIENT
Start: 2024-07-21 | End: 2024-07-23 | Stop reason: HOSPADM

## 2024-07-21 RX ORDER — SODIUM CHLORIDE 0.9 % (FLUSH) 0.9 %
10 SYRINGE (ML) INJECTION EVERY 12 HOURS SCHEDULED
Status: DISCONTINUED | OUTPATIENT
Start: 2024-07-21 | End: 2024-07-23 | Stop reason: HOSPADM

## 2024-07-21 RX ORDER — BUTALBITAL, ACETAMINOPHEN AND CAFFEINE 50; 325; 40 MG/1; MG/1; MG/1
2 TABLET ORAL ONCE
Status: COMPLETED | OUTPATIENT
Start: 2024-07-21 | End: 2024-07-21

## 2024-07-21 RX ORDER — BUPROPION HYDROCHLORIDE 150 MG/1
300 TABLET ORAL DAILY
Status: DISCONTINUED | OUTPATIENT
Start: 2024-07-21 | End: 2024-07-23 | Stop reason: HOSPADM

## 2024-07-21 RX ORDER — IBUPROFEN 600 MG/1
1 TABLET ORAL
Status: DISCONTINUED | OUTPATIENT
Start: 2024-07-21 | End: 2024-07-23 | Stop reason: HOSPADM

## 2024-07-21 RX ORDER — MAGNESIUM SULFATE HEPTAHYDRATE 40 MG/ML
INJECTION, SOLUTION INTRAVENOUS
Status: DISCONTINUED
Start: 2024-07-21 | End: 2024-07-23 | Stop reason: HOSPADM

## 2024-07-21 RX ORDER — POLYETHYLENE GLYCOL 3350 17 G/17G
17 POWDER, FOR SOLUTION ORAL DAILY
Status: DISCONTINUED | OUTPATIENT
Start: 2024-07-21 | End: 2024-07-23 | Stop reason: HOSPADM

## 2024-07-21 RX ORDER — ONDANSETRON 4 MG/1
8 TABLET, ORALLY DISINTEGRATING ORAL EVERY 8 HOURS PRN
Status: DISCONTINUED | OUTPATIENT
Start: 2024-07-21 | End: 2024-07-23 | Stop reason: HOSPADM

## 2024-07-21 RX ORDER — INSULIN LISPRO 100 [IU]/ML
2-9 INJECTION, SOLUTION INTRAVENOUS; SUBCUTANEOUS
Status: DISCONTINUED | OUTPATIENT
Start: 2024-07-21 | End: 2024-07-21

## 2024-07-21 RX ORDER — BISACODYL 10 MG
10 SUPPOSITORY, RECTAL RECTAL DAILY PRN
Status: DISCONTINUED | OUTPATIENT
Start: 2024-07-21 | End: 2024-07-23 | Stop reason: HOSPADM

## 2024-07-21 RX ORDER — FAMOTIDINE 20 MG/1
20 TABLET, FILM COATED ORAL 2 TIMES DAILY
COMMUNITY

## 2024-07-21 RX ORDER — CITRIC ACID/SODIUM CITRATE 334-500MG
SOLUTION, ORAL ORAL
Status: COMPLETED
Start: 2024-07-21 | End: 2024-07-21

## 2024-07-21 RX ORDER — NICOTINE POLACRILEX 4 MG
15 LOZENGE BUCCAL
Status: DISCONTINUED | OUTPATIENT
Start: 2024-07-21 | End: 2024-07-23 | Stop reason: HOSPADM

## 2024-07-21 RX ORDER — FAMOTIDINE 20 MG/1
20 TABLET, FILM COATED ORAL
Status: DISCONTINUED | OUTPATIENT
Start: 2024-07-21 | End: 2024-07-23 | Stop reason: HOSPADM

## 2024-07-21 RX ORDER — LUBIPROSTONE 8 UG/1
8 CAPSULE ORAL 2 TIMES DAILY
Status: DISCONTINUED | OUTPATIENT
Start: 2024-07-21 | End: 2024-07-21

## 2024-07-21 RX ORDER — DEXTROSE MONOHYDRATE 25 G/50ML
25 INJECTION, SOLUTION INTRAVENOUS
Status: DISCONTINUED | OUTPATIENT
Start: 2024-07-21 | End: 2024-07-23 | Stop reason: HOSPADM

## 2024-07-21 RX ORDER — BETAMETHASONE SODIUM PHOSPHATE AND BETAMETHASONE ACETATE 3; 3 MG/ML; MG/ML
12 INJECTION, SUSPENSION INTRA-ARTICULAR; INTRALESIONAL; INTRAMUSCULAR; SOFT TISSUE EVERY 24 HOURS
Status: COMPLETED | OUTPATIENT
Start: 2024-07-21 | End: 2024-07-22

## 2024-07-21 RX ORDER — DEXTROSE, SODIUM CHLORIDE, SODIUM LACTATE, POTASSIUM CHLORIDE, AND CALCIUM CHLORIDE 5; .6; .31; .03; .02 G/100ML; G/100ML; G/100ML; G/100ML; G/100ML
1000 INJECTION, SOLUTION INTRAVENOUS ONCE
Status: COMPLETED | OUTPATIENT
Start: 2024-07-21 | End: 2024-07-21

## 2024-07-21 RX ADMIN — MAGNESIUM SULFATE HEPTAHYDRATE 2 G/HR: 40 INJECTION, SOLUTION INTRAVENOUS at 09:49

## 2024-07-21 RX ADMIN — AMPICILLIN SODIUM 1 G: 1 INJECTION, POWDER, FOR SOLUTION INTRAMUSCULAR; INTRAVENOUS at 06:32

## 2024-07-21 RX ADMIN — SODIUM CHLORIDE, SODIUM LACTATE, POTASSIUM CHLORIDE, CALCIUM CHLORIDE AND DEXTROSE MONOHYDRATE 1000 ML/HR: 5; 600; 310; 30; 20 INJECTION, SOLUTION INTRAVENOUS at 00:58

## 2024-07-21 RX ADMIN — AMPICILLIN SODIUM 1 G: 1 INJECTION, POWDER, FOR SOLUTION INTRAMUSCULAR; INTRAVENOUS at 22:33

## 2024-07-21 RX ADMIN — BUTALBITAL, ACETAMINOPHEN, AND CAFFEINE 2 TABLET: 50; 325; 40 TABLET ORAL at 23:40

## 2024-07-21 RX ADMIN — ACETAMINOPHEN 1000 MG: 500 TABLET ORAL at 13:56

## 2024-07-21 RX ADMIN — POLYETHYLENE GLYCOL 3350 17 G: 17 POWDER, FOR SOLUTION ORAL at 09:04

## 2024-07-21 RX ADMIN — DOCUSATE SODIUM 100 MG: 100 CAPSULE, LIQUID FILLED ORAL at 09:03

## 2024-07-21 RX ADMIN — Medication 30 ML: at 14:35

## 2024-07-21 RX ADMIN — Medication 10 ML: at 03:00

## 2024-07-21 RX ADMIN — AMPICILLIN SODIUM 1 G: 1 INJECTION, POWDER, FOR SOLUTION INTRAMUSCULAR; INTRAVENOUS at 10:32

## 2024-07-21 RX ADMIN — ACETAMINOPHEN 1000 MG: 500 TABLET ORAL at 21:36

## 2024-07-21 RX ADMIN — ACETAMINOPHEN 1000 MG: 500 TABLET ORAL at 03:28

## 2024-07-21 RX ADMIN — FAMOTIDINE 20 MG: 20 TABLET, FILM COATED ORAL at 17:02

## 2024-07-21 RX ADMIN — DEXTROSE AND SODIUM CHLORIDE 75 ML/HR: 5; 200 INJECTION, SOLUTION INTRAVENOUS at 17:30

## 2024-07-21 RX ADMIN — SODIUM CITRATE AND CITRIC ACID MONOHYDRATE 30 ML: 500; 334 SOLUTION ORAL at 14:35

## 2024-07-21 RX ADMIN — BUPROPION HYDROCHLORIDE 300 MG: 150 TABLET, EXTENDED RELEASE ORAL at 09:04

## 2024-07-21 RX ADMIN — INSULIN LISPRO 2 UNITS: 100 INJECTION, SOLUTION INTRAVENOUS; SUBCUTANEOUS at 14:43

## 2024-07-21 RX ADMIN — FLUOXETINE HYDROCHLORIDE 10 MG: 10 CAPSULE ORAL at 09:03

## 2024-07-21 RX ADMIN — AMPICILLIN SODIUM 1 G: 1 INJECTION, POWDER, FOR SOLUTION INTRAMUSCULAR; INTRAVENOUS at 18:37

## 2024-07-21 RX ADMIN — AMPICILLIN SODIUM 1 G: 1 INJECTION, POWDER, FOR SOLUTION INTRAMUSCULAR; INTRAVENOUS at 13:56

## 2024-07-21 RX ADMIN — DOCUSATE SODIUM 100 MG: 100 CAPSULE, LIQUID FILLED ORAL at 20:54

## 2024-07-21 RX ADMIN — ONDANSETRON 4 MG: 2 INJECTION INTRAMUSCULAR; INTRAVENOUS at 13:55

## 2024-07-21 RX ADMIN — CARIPRAZINE 1.5 MG: 1.5 CAPSULE, GELATIN COATED ORAL at 09:04

## 2024-07-21 RX ADMIN — ONDANSETRON 4 MG: 2 INJECTION INTRAMUSCULAR; INTRAVENOUS at 01:59

## 2024-07-21 RX ADMIN — FAMOTIDINE 20 MG: 20 TABLET, FILM COATED ORAL at 07:29

## 2024-07-21 RX ADMIN — DEXTROSE AND SODIUM CHLORIDE 125 ML/HR: 5; 200 INJECTION, SOLUTION INTRAVENOUS at 02:25

## 2024-07-21 RX ADMIN — INSULIN LISPRO 2 UNITS: 100 INJECTION, SOLUTION INTRAVENOUS; SUBCUTANEOUS at 20:55

## 2024-07-21 RX ADMIN — CALCIUM CARBONATE (ANTACID) CHEW TAB 500 MG 2 TABLET: 500 CHEW TAB at 23:44

## 2024-07-21 RX ADMIN — AMPICILLIN SODIUM 2000 MG: 2 INJECTION, POWDER, FOR SOLUTION INTRAMUSCULAR; INTRAVENOUS at 02:56

## 2024-07-21 RX ADMIN — BETAMETHASONE SODIUM PHOSPHATE AND BETAMETHASONE ACETATE 12 MG: 3; 3 INJECTION, SUSPENSION INTRA-ARTICULAR; INTRALESIONAL; INTRAMUSCULAR at 02:56

## 2024-07-21 NOTE — PROGRESS NOTES
Daily Progress Note    Patient name: Ely De La Garza  YOB: 1985   MRN: [id number]  Admission Date: 2024  Date of Service: 2024  Referring Provider: Itzel Damon MD    Ely De La Garza is a 38 y.o.    at 31w6d  admitted on 2024 for  labor in third trimester without delivery    Hospital day 0      Diagnoses:   Patient Active Problem List    Diagnosis     * labor in third trimester without delivery [O60.03]     Rubella non-immune status, antepartum [O09.899, Z28.39]     Pelvic pain during pregnancy in second trimester, antepartum [O26.892, R10.2]     Placenta previa antepartum [O44.00]     Pregnancy [Z34.90]     Previous  section [Z98.891]     Antepartum multigravida of advanced maternal age [O09.529]     History of  delivery [Z87.51]     History of sleeve gastrectomy [Z90.3]     Migraine with aura and without status migrainosus, not intractable [G43.109]     Depression [F32.A]     Polycystic ovaries [E28.2]        Chief Complaint:  Chief Complaint   Patient presents with    Abdominal Cramping     X4 hours, abdominal cramping and back pain thinks are ctxs.  States that she has also been vomiting all day         Subjective:      Ely reports mainly back pain.  Feels she may be having contractions about every 10 minutes or so.  Reports fetal movement is normal  Denies leakage of amniotic fluid.  Denies vaginal bleeding    Objective:     Vital signs:  Temp:  [98 °F (36.7 °C)-98.7 °F (37.1 °C)] 98.7 °F (37.1 °C)  Heart Rate:  [] 96  Resp:  [16-20] 20  BP: (117-139)/(60-83) 132/73    Abdomen: soft, nontender  Uterus: gravid, nontender  Extremities: nontender; no edema        Non-Stress Test: Time on 9:12 AM.  Time off 9:44 AM    Fetal Heart Rate Assessment   Method: Fetal HR Assessment Method: external   Beats/min: Fetal HR (beats/min): 125   Baseline: Fetal HR Baseline: normal range   Variability: Fetal HR Variability: moderate (amplitude  range 6 to 25 bpm)   Accels: Fetal HR Accelerations: greater than/equal to 10 bpm (32 wks gest or less), lasts at least 10 seconds (32 wks gest or less)   Decels: Fetal HR Decelerations: absent   Tracing Category:       Uterine Assessment   Method: Method: external tocotransducer   Frequency (min):     Ctx Count in 10 min:     Duration:     Intensity: Contraction Intensity: Occasional   Intensity by IUPC:     Resting Tone: Uterine Resting Tone: soft by palpation   Resting Tone by IUPC:     Radha Units:       Cervix: Exam by: Method: sterile exam per physician   Dilation:     Effacement: Cervical Effacement: 50%   Station:             Medications:  ampicillin, 1 g, Intravenous, Q4H  betamethasone acetate-betamethasone sodium phosphate, 12 mg, Intramuscular, Q24H  buPROPion XL, 300 mg, Oral, Daily  Cariprazine HCl, 1.5 mg, Oral, Daily  docusate sodium, 100 mg, Oral, BID  famotidine, 20 mg, Oral, BID AC  FLUoxetine, 10 mg, Oral, Daily  magnesium sulfate, , ,   polyethylene glycol, 17 g, Oral, Daily  sodium chloride, 10 mL, Intravenous, Q12H         acetaminophen    bisacodyl    docusate sodium    magnesium sulfate    ondansetron ODT **OR** ondansetron    sodium chloride    Labs:  Lab Results (last 24 hours)       Procedure Component Value Units Date/Time    POC Glucose Once [[id number]]  (Abnormal) Collected: 07/21/24 1002    Specimen: Blood Updated: 07/21/24 1014     Glucose 256 mg/dL     Comprehensive Metabolic Panel [[id number]]  (Abnormal) Collected: 07/21/24 0101    Specimen: Blood Updated: 07/21/24 0133     Glucose 103 mg/dL      BUN 4 mg/dL      Creatinine 0.48 mg/dL      Sodium 137 mmol/L      Potassium 3.9 mmol/L      Chloride 105 mmol/L      CO2 22.0 mmol/L      Calcium 8.1 mg/dL      Total Protein 6.1 g/dL      Albumin 3.3 g/dL      ALT (SGPT) 8 U/L      AST (SGOT) 12 U/L      Alkaline Phosphatase 103 U/L      Total Bilirubin 0.3 mg/dL      Globulin 2.8 gm/dL      Comment: Calculated Result        A/G  Ratio 1.2 g/dL      BUN/Creatinine Ratio 8.3     Anion Gap 10.0 mmol/L      eGFR 123.7 mL/min/1.73     Narrative:      GFR Normal >60  Chronic Kidney Disease <60  Kidney Failure <15      CBC (No Diff) [[id number]]  (Abnormal) Collected: 24 010    Specimen: Blood Updated: 24 0113     WBC 9.81 10*3/mm3      RBC 3.57 10*6/mm3      Hemoglobin 10.6 g/dL      Hematocrit 30.3 %      MCV 84.9 fL      MCH 29.7 pg      MCHC 35.0 g/dL      RDW 12.1 %      RDW-SD 36.8 fl      MPV 9.7 fL      Platelets 137 10*3/mm3     Urinalysis With Microscopic If Indicated (No Culture) - Urine, Clean Catch [[id number]]  (Normal) Collected: 24 001    Specimen: Urine, Clean Catch Updated: 24 0053     Color, UA Yellow     Appearance, UA Clear     pH, UA 6.0     Specific Gravity, UA <=1.005     Glucose, UA Negative     Ketones, UA Negative     Bilirubin, UA Negative     Blood, UA Negative     Protein, UA Negative     Leuk Esterase, UA Negative     Nitrite, UA Negative     Urobilinogen, UA 0.2 E.U./dL    Narrative:      Urine microscopic not indicated.          Lab Results   Component Value Date    HGB 10.6 (L) 2024         Assessment/Plan:      Ely is a 38 y.o.    at 31w6d.  1.  labor in third trimester without delivery:  Patient on magnesium sulfate toco lysis through steroid time.  First dose given.  Plan for PDC evaluation in the a.m.  Will have continuous tocometer.  Patient also with evaluation for blood sugars as she was unable to complete Glucola testing secondary to history of gastric sleeve.      Random: Patient with elevated postprandial blood sugars.  Will order sliding scale insulin.  All questions were answered to the best of my ability.    Alisa Gaming MD  2024

## 2024-07-21 NOTE — H&P
"Ely Lebron Frederic  1985  [id number]  [id number]    CC: contractions  HPI:  Patient is 38 y.o. female   currently at 31w6d presents with c/o contractions, onset ~, intermittent, no assoc bleeding or leaking.  FM decreased today.  Pt also with N and V all day (unable to keep anything down).    PMH:  Current meds: PNV, wellbutrin 300mg/d, prozac 10mg/d, vraylar 1.5mg/d, folate   Linzess, mag oxide, P4 suppos, phenergan 25mg prn  Illnesses: depression, IBS-C, goiter (normal TFT's), GERD, PCOS  Surgeries: , gastric sleeve, left hip replacement, oral surg, T and A,   Upper and lower body lift, thigh lift, breast aug  Allergies: NKDA    Past OB History:       OB History    Para Term  AB Living   2 1 0 1 0 1   SAB IAB Ectopic Molar Multiple Live Births   0 0 0 0 0 1      # Outcome Date GA Lbr Clem/2nd Weight Sex Type Anes PTL Lv   2 Current            1  09 29w0d  1531 g (3 lb 6 oz) M CS-Unspec Spinal  JOSE FRANCISCO      Birth Comments: Had bulging membranes and was 6 cm after being in hospital for 3 days      Complications: Incompetent cervix in pregnancy, antepartum, third trimester, PROM (premature rupture of membranes)      Obstetric Comments   Fob #1 - Pregnancy #1 and #2       Pregnancy #1 - Pt reports she was in the hospital for 3 days on bedrest, then had PROM, was 6cm at that time             SH: tob neg , EtOH neg, drugs neg      General ROS: contractions, N, V.   All other systems reviewed and are negative.      Physical Examination: General appearance - alert, well appearing, and in no distress  Vital signs - Ht 167.6 cm (66\")   Wt 94.8 kg (209 lb)   LMP 2023   BMI 33.73 kg/m²   HEENT: normocephalic, atraumatic,oropharynx clear, appearance of ears and nose normal  Neck - supple, no significant adenopathy, no thyromegaly  Lymphatics - no palpable lymphadenopathy in the neck or groin, no hepatosplenomegaly  Chest - clear to auscultation, no wheezes, rales or " rhonchi, respiratory effort non-labored  Heart - normal rate, regular rhythm, no murmurs, rubs, clicks or gallops, no JVD, trace lower extremity edema  Abdomen - soft, nontender, nondistended, no masses, no hepatosplenomegaly  no rebound tenderness noted, bowel sounds normal  Vaginal Exam: cl-1/50%/-2, no blood in vault,external genitalia normal  Extremities - trace pedal edema noted, no calf tend  Skin -warm and dry, normal coloration and turgor, no rashes, no suspicious skin lesions noted      Fetal monitoring: indication contractions, onset 0021, offset 0135, baseline 135, mod  BTB variability, multiple accels (15 X 15), no decels, irreg contractions, interpretation reactive NST    Radiology     Assessment 1)IUP 31 6/7 weeks   2)threatened  labor   3)prev  delivery   4)prev jennifer   5)placenta previa   6)adv mat age    Plan 1)admit   2)magnesium/steroids/antibiotics   3)labs    Abelino Bishop MD  2024  00:32 EDT

## 2024-07-22 ENCOUNTER — APPOINTMENT (OUTPATIENT)
Dept: WOMENS IMAGING | Facility: HOSPITAL | Age: 39
End: 2024-07-22
Payer: COMMERCIAL

## 2024-07-22 PROBLEM — R10.2 PELVIC PAIN DURING PREGNANCY IN SECOND TRIMESTER, ANTEPARTUM: Status: RESOLVED | Noted: 2024-06-01 | Resolved: 2024-07-22

## 2024-07-22 PROBLEM — O26.892 PELVIC PAIN DURING PREGNANCY IN SECOND TRIMESTER, ANTEPARTUM: Status: RESOLVED | Noted: 2024-06-01 | Resolved: 2024-07-22

## 2024-07-22 LAB
GLUCOSE BLDC GLUCOMTR-MCNC: 153 MG/DL (ref 70–130)
GLUCOSE BLDC GLUCOMTR-MCNC: 162 MG/DL (ref 70–130)
GLUCOSE BLDC GLUCOMTR-MCNC: 177 MG/DL (ref 70–130)
GLUCOSE BLDC GLUCOMTR-MCNC: 189 MG/DL (ref 70–130)

## 2024-07-22 PROCEDURE — 0202U NFCT DS 22 TRGT SARS-COV-2: CPT | Performed by: OBSTETRICS & GYNECOLOGY

## 2024-07-22 PROCEDURE — 76819 FETAL BIOPHYS PROFIL W/O NST: CPT

## 2024-07-22 PROCEDURE — 76816 OB US FOLLOW-UP PER FETUS: CPT | Performed by: OBSTETRICS & GYNECOLOGY

## 2024-07-22 PROCEDURE — 25010000002 ONDANSETRON PER 1 MG: Performed by: OBSTETRICS & GYNECOLOGY

## 2024-07-22 PROCEDURE — 59025 FETAL NON-STRESS TEST: CPT

## 2024-07-22 PROCEDURE — 25010000002 AMPICILLIN PER 500 MG: Performed by: OBSTETRICS & GYNECOLOGY

## 2024-07-22 PROCEDURE — 63710000001 INSULIN LISPRO (HUMAN) PER 5 UNITS: Performed by: OBSTETRICS & GYNECOLOGY

## 2024-07-22 PROCEDURE — 25010000002 BETAMETHASONE ACET & SOD PHOS PER 4 MG: Performed by: OBSTETRICS & GYNECOLOGY

## 2024-07-22 PROCEDURE — 59025 FETAL NON-STRESS TEST: CPT | Performed by: OBSTETRICS & GYNECOLOGY

## 2024-07-22 PROCEDURE — 82948 REAGENT STRIP/BLOOD GLUCOSE: CPT

## 2024-07-22 PROCEDURE — 76816 OB US FOLLOW-UP PER FETUS: CPT

## 2024-07-22 PROCEDURE — 25010000002 MAGNESIUM SULFATE 20 GM/500ML SOLUTION: Performed by: OBSTETRICS & GYNECOLOGY

## 2024-07-22 PROCEDURE — 76819 FETAL BIOPHYS PROFIL W/O NST: CPT | Performed by: OBSTETRICS & GYNECOLOGY

## 2024-07-22 PROCEDURE — 99232 SBSQ HOSP IP/OBS MODERATE 35: CPT | Performed by: OBSTETRICS & GYNECOLOGY

## 2024-07-22 RX ORDER — FAMOTIDINE 10 MG/ML
20 INJECTION, SOLUTION INTRAVENOUS ONCE
Status: COMPLETED | OUTPATIENT
Start: 2024-07-22 | End: 2024-07-22

## 2024-07-22 RX ORDER — SODIUM CHLORIDE 450 MG/100ML
75 INJECTION, SOLUTION INTRAVENOUS CONTINUOUS
Status: DISCONTINUED | OUTPATIENT
Start: 2024-07-22 | End: 2024-07-23 | Stop reason: HOSPADM

## 2024-07-22 RX ADMIN — AMPICILLIN SODIUM 1 G: 1 INJECTION, POWDER, FOR SOLUTION INTRAMUSCULAR; INTRAVENOUS at 17:46

## 2024-07-22 RX ADMIN — INSULIN LISPRO 2 UNITS: 100 INJECTION, SOLUTION INTRAVENOUS; SUBCUTANEOUS at 10:43

## 2024-07-22 RX ADMIN — AMPICILLIN SODIUM 1 G: 1 INJECTION, POWDER, FOR SOLUTION INTRAMUSCULAR; INTRAVENOUS at 02:29

## 2024-07-22 RX ADMIN — INSULIN LISPRO 2 UNITS: 100 INJECTION, SOLUTION INTRAVENOUS; SUBCUTANEOUS at 14:28

## 2024-07-22 RX ADMIN — AMPICILLIN SODIUM 1 G: 1 INJECTION, POWDER, FOR SOLUTION INTRAMUSCULAR; INTRAVENOUS at 06:33

## 2024-07-22 RX ADMIN — SODIUM CHLORIDE 75 ML/HR: 4.5 INJECTION, SOLUTION INTRAVENOUS at 09:10

## 2024-07-22 RX ADMIN — DOCUSATE SODIUM 100 MG: 100 CAPSULE, LIQUID FILLED ORAL at 22:26

## 2024-07-22 RX ADMIN — FAMOTIDINE 20 MG: 20 TABLET, FILM COATED ORAL at 07:48

## 2024-07-22 RX ADMIN — MAGNESIUM SULFATE HEPTAHYDRATE 2 G/HR: 40 INJECTION, SOLUTION INTRAVENOUS at 17:44

## 2024-07-22 RX ADMIN — FAMOTIDINE 20 MG: 10 INJECTION INTRAVENOUS at 03:11

## 2024-07-22 RX ADMIN — INSULIN LISPRO 2 UNITS: 100 INJECTION, SOLUTION INTRAVENOUS; SUBCUTANEOUS at 19:49

## 2024-07-22 RX ADMIN — CARIPRAZINE 1.5 MG: 1.5 CAPSULE, GELATIN COATED ORAL at 08:28

## 2024-07-22 RX ADMIN — POLYETHYLENE GLYCOL 3350 17 G: 17 POWDER, FOR SOLUTION ORAL at 08:37

## 2024-07-22 RX ADMIN — INSULIN LISPRO 2 UNITS: 100 INJECTION, SOLUTION INTRAVENOUS; SUBCUTANEOUS at 08:03

## 2024-07-22 RX ADMIN — AMPICILLIN SODIUM 1 G: 1 INJECTION, POWDER, FOR SOLUTION INTRAMUSCULAR; INTRAVENOUS at 10:27

## 2024-07-22 RX ADMIN — FAMOTIDINE 20 MG: 20 TABLET, FILM COATED ORAL at 17:41

## 2024-07-22 RX ADMIN — MAGNESIUM SULFATE HEPTAHYDRATE 2 G/HR: 40 INJECTION, SOLUTION INTRAVENOUS at 08:27

## 2024-07-22 RX ADMIN — AMPICILLIN SODIUM 1 G: 1 INJECTION, POWDER, FOR SOLUTION INTRAMUSCULAR; INTRAVENOUS at 22:26

## 2024-07-22 RX ADMIN — CALCIUM CARBONATE (ANTACID) CHEW TAB 500 MG 2 TABLET: 500 CHEW TAB at 01:17

## 2024-07-22 RX ADMIN — BETAMETHASONE SODIUM PHOSPHATE AND BETAMETHASONE ACETATE 12 MG: 3; 3 INJECTION, SUSPENSION INTRA-ARTICULAR; INTRALESIONAL; INTRAMUSCULAR at 02:28

## 2024-07-22 RX ADMIN — BUPROPION HYDROCHLORIDE 300 MG: 150 TABLET, EXTENDED RELEASE ORAL at 08:28

## 2024-07-22 RX ADMIN — ACETAMINOPHEN 1000 MG: 500 TABLET ORAL at 19:49

## 2024-07-22 RX ADMIN — DOCUSATE SODIUM 100 MG: 100 CAPSULE, LIQUID FILLED ORAL at 08:28

## 2024-07-22 RX ADMIN — AMPICILLIN SODIUM 1 G: 1 INJECTION, POWDER, FOR SOLUTION INTRAMUSCULAR; INTRAVENOUS at 15:32

## 2024-07-22 RX ADMIN — FLUOXETINE HYDROCHLORIDE 10 MG: 10 CAPSULE ORAL at 08:27

## 2024-07-22 RX ADMIN — ONDANSETRON 4 MG: 2 INJECTION INTRAMUSCULAR; INTRAVENOUS at 03:04

## 2024-07-22 NOTE — PROGRESS NOTES
2024  HD:1  38 y.o. female  at 32w0d    Subjective   Ely denies contractions this am. Had 1 contractions this shift so far. Good FM. No vaginal bleeidng.     PDC US this morning, persistent previa, normal growth and fluid. Elevated cord doppler. BPP        Objective   Temp: Temp:  [98.3 °F (36.8 °C)-98.7 °F (37.1 °C)] 98.3 °F (36.8 °C) Temp src: Oral   BP: BP: (112-143)/(57-84) 114/57        Pulse: Heart Rate:  [] 88  RR: Resp:  [16-20] 16    General:  nad   Abdomen: Gravid, nontender         Lab Results   Component Value Date    WBC 9.81 2024    HGB 10.6 (L) 2024    HCT 30.3 (L) 2024    MCV 84.9 2024     (L) 2024    HEPBSAG Negative 2024     Results from last 7 days   Lab Units 24  0101   AST (SGOT) U/L 12     Results from last 7 days   Lab Units 24  0101   ALT (SGPT) U/L 8      Results from last 7 days   Lab Units 24  0101   CREATININE mg/dL 0.48*      Results from last 7 days   Lab Units 24  0101   BILIRUBIN mg/dL 0.3     Non Stress Test: minutes 20  non-stress test: NST: Reactive  indication: Questionable Labor      Assessment  1.   38 y.o. yo female  at 32w0d  2. Admitted for  contractions. Cervix FT, but with history of 29 wk delivery previously, she was admitted for mag, steroids. She has been stable since admission. Will be steroid complete overnight tonight.   3. Placenta previa, no bleeding.  4. A1DM- has been getting sliding scale insulin due to steroid course.   5. Elevated cord dopplers, with reassuring fetal monitoring.   6.  Prev LTUI c/s, planned repeat, salpingectomy.     Plan  Cont current hospital care.   2. Will DC Magnesium and abx when steroid complete.   3. Cont FSBS, SSI.   4. If cont to be stable after off mag, can consider home tomorrow. Will need repeat dopplers with PDC as outpatient, and close monitoring.     This note has been electronically signed.    Itzel Damon MD  ,  2024

## 2024-07-23 ENCOUNTER — TELEPHONE (OUTPATIENT)
Dept: OBSTETRICS AND GYNECOLOGY | Facility: CLINIC | Age: 39
End: 2024-07-23
Payer: COMMERCIAL

## 2024-07-23 VITALS
HEIGHT: 66 IN | HEART RATE: 91 BPM | TEMPERATURE: 98 F | OXYGEN SATURATION: 99 % | RESPIRATION RATE: 16 BRPM | DIASTOLIC BLOOD PRESSURE: 66 MMHG | BODY MASS INDEX: 33.59 KG/M2 | SYSTOLIC BLOOD PRESSURE: 119 MMHG | WEIGHT: 209 LBS

## 2024-07-23 LAB
B PARAPERT DNA SPEC QL NAA+PROBE: NOT DETECTED
B PERT DNA SPEC QL NAA+PROBE: NOT DETECTED
C PNEUM DNA NPH QL NAA+NON-PROBE: NOT DETECTED
FLUAV SUBTYP SPEC NAA+PROBE: NOT DETECTED
FLUBV RNA ISLT QL NAA+PROBE: NOT DETECTED
GLUCOSE BLDC GLUCOMTR-MCNC: 128 MG/DL (ref 70–130)
GLUCOSE BLDC GLUCOMTR-MCNC: 164 MG/DL (ref 70–130)
HADV DNA SPEC NAA+PROBE: NOT DETECTED
HCOV 229E RNA SPEC QL NAA+PROBE: NOT DETECTED
HCOV HKU1 RNA SPEC QL NAA+PROBE: NOT DETECTED
HCOV NL63 RNA SPEC QL NAA+PROBE: NOT DETECTED
HCOV OC43 RNA SPEC QL NAA+PROBE: NOT DETECTED
HMPV RNA NPH QL NAA+NON-PROBE: NOT DETECTED
HPIV1 RNA ISLT QL NAA+PROBE: NOT DETECTED
HPIV2 RNA SPEC QL NAA+PROBE: NOT DETECTED
HPIV3 RNA NPH QL NAA+PROBE: NOT DETECTED
HPIV4 P GENE NPH QL NAA+PROBE: NOT DETECTED
M PNEUMO IGG SER IA-ACNC: NOT DETECTED
RHINOVIRUS RNA SPEC NAA+PROBE: NOT DETECTED
RSV RNA NPH QL NAA+NON-PROBE: NOT DETECTED
SARS-COV-2 RNA NPH QL NAA+NON-PROBE: NOT DETECTED

## 2024-07-23 PROCEDURE — 59025 FETAL NON-STRESS TEST: CPT | Performed by: OBSTETRICS & GYNECOLOGY

## 2024-07-23 PROCEDURE — 59025 FETAL NON-STRESS TEST: CPT

## 2024-07-23 PROCEDURE — 99238 HOSP IP/OBS DSCHRG MGMT 30/<: CPT | Performed by: OBSTETRICS & GYNECOLOGY

## 2024-07-23 PROCEDURE — 82948 REAGENT STRIP/BLOOD GLUCOSE: CPT

## 2024-07-23 PROCEDURE — 63710000001 INSULIN LISPRO (HUMAN) PER 5 UNITS: Performed by: OBSTETRICS & GYNECOLOGY

## 2024-07-23 RX ADMIN — POLYETHYLENE GLYCOL 3350 17 G: 17 POWDER, FOR SOLUTION ORAL at 08:05

## 2024-07-23 RX ADMIN — BUPROPION HYDROCHLORIDE 300 MG: 150 TABLET, EXTENDED RELEASE ORAL at 08:05

## 2024-07-23 RX ADMIN — INSULIN LISPRO 2 UNITS: 100 INJECTION, SOLUTION INTRAVENOUS; SUBCUTANEOUS at 07:41

## 2024-07-23 RX ADMIN — DOCUSATE SODIUM 100 MG: 100 CAPSULE, LIQUID FILLED ORAL at 08:05

## 2024-07-23 RX ADMIN — FAMOTIDINE 20 MG: 20 TABLET, FILM COATED ORAL at 08:05

## 2024-07-23 RX ADMIN — FLUOXETINE HYDROCHLORIDE 10 MG: 10 CAPSULE ORAL at 08:05

## 2024-07-23 RX ADMIN — CARIPRAZINE 1.5 MG: 1.5 CAPSULE, GELATIN COATED ORAL at 08:05

## 2024-07-23 NOTE — TELEPHONE ENCOUNTER
Caller: Ely De La Garza    Relationship: Self    Best call back number: [phone]    What is the best time to reach you: ANY    Who are you requesting to speak with (clinical staff, provider,  specific staff member): STILES OR NURSE    What was the call regarding: HOSPITAL F/U WITH [facility] FOR A NON STRESS TEST ON 07/26/24 PER [facility]    Is it okay if the provider responds through MyChart: CALL PT, OK TO M    HUB UNABLE TO WARM TRANSFER

## 2024-07-23 NOTE — DISCHARGE SUMMARY
2024  HD:2  38 y.o. female  at 32w1d    Subjective   Ely has no complaints. Magnesium turned off overnight when steroid complete, and she denies contractions or VB. Reports good FM.        Objective   Temp: Temp:  [98 °F (36.7 °C)-99.7 °F (37.6 °C)] 98 °F (36.7 °C) Temp src: Oral   BP: BP: (112-136)/(57-82) 119/66        Pulse: Heart Rate:  [80-97] 91  RR: Resp:  [16] 16    General:  nad   Abdomen: Gravid, nontender         Lab Results   Component Value Date    WBC 9.81 2024    HGB 10.6 (L) 2024    HCT 30.3 (L) 2024    MCV 84.9 2024     (L) 2024    HEPBSAG Negative 2024     Results from last 7 days   Lab Units 24  0101   AST (SGOT) U/L 12     Results from last 7 days   Lab Units 24  0101   ALT (SGPT) U/L 8      Results from last 7 days   Lab Units 24  0101   CREATININE mg/dL 0.48*      Results from last 7 days   Lab Units 24  0101   BILIRUBIN mg/dL 0.3     Non Stress Test: minutes 20  non-stress test: NST: Reactive  indication: Questionable Labor     Assessment  1.   38 y.o. yo female  at 32w1d  2.  contractions with history of 29 wk delivery in prev preg, no current  evidence of NOEMI, now s/p steroids  3. Placenta previa. No vaginal bleeding.   4. Elevated fetal cord dopplers, per PDC needs FU dopplers in 1 week. Start twice weekly monitoring.   5. A1DM- cont FSBS 4x daily.    Plan  Ok for DC home in stable condition. NST Friday in office, and PDC US on Tuesday.     This note has been electronically signed.    Iztel Damon MD  2024

## 2024-07-24 NOTE — PAYOR COMM NOTE
"Rufino Padgett (38 y.o. Female)     From:Kaylyn Mims LPN, Utilization Review  Phone #[phone]  Fax #[phone]      Auth#[id number]     Discharged 24.          Date of Birth   1985    Social Security Number       Address   [address] [place] [address]    Home Phone   [phone]    MRN   [id number]       Protestant   Congregational    Marital Status                               Admission Date   24    Admission Type   Elective    Admitting Provider   Itzel Damon MD    Attending Provider       Department, Room/Bed   [facility] ANTEPARTUM, N333/1       Discharge Date   2024    Discharge Disposition   Home or Self Care    Discharge Destination                                 Attending Provider: (none)   Allergies: No Known Allergies    Isolation: None   Infection: None   Code Status: Prior    Ht: 167.6 cm (66\")   Wt: 94.8 kg (209 lb)    Admission Cmt: None   Principal Problem:  labor in third trimester without delivery [O60.03]                   Active Insurance as of 2024       Primary Coverage       Payor Plan Insurance Group Employer/Plan Group    [employer] BLUE CROSS [employer] BLUE CROSS BLUE [employer]IELD PPO [id number]       Payor Plan Address Payor Plan Phone Number Payor Plan Fax Number Effective Dates    [address] [phone]  2018 - None Entered    [place] [address]         Subscriber Name Subscriber Birth Date Member ID       RUFINO PADGETT 1985 [id number]                     Emergency Contacts        (Rel.) Home Phone Work Phone Mobile Phone    PadgettBridger (Spouse) [phone] -- [phone]    Conchita Hawk (Mother) [phone] -- [phone]                 Discharge Summary        Itzel Damon MD at 24 1015          2024  HD:2  38 y.o. female  at 32w1d    Subjective   Rufino has no complaints. Magnesium turned off overnight when steroid complete, and she " denies contractions or VB. Reports good FM.        Objective   Temp: Temp:  [98 °F (36.7 °C)-99.7 °F (37.6 °C)] 98 °F (36.7 °C) Temp src: Oral   BP: BP: (112-136)/(57-82) 119/66        Pulse: Heart Rate:  [80-97] 91  RR: Resp:  [16] 16    General:  nad   Abdomen: Gravid, nontender         Lab Results   Component Value Date    WBC 9.81 2024    HGB 10.6 (L) 2024    HCT 30.3 (L) 2024    MCV 84.9 2024     (L) 2024    HEPBSAG Negative 2024     Results from last 7 days   Lab Units 24  0101   AST (SGOT) U/L 12     Results from last 7 days   Lab Units 24  0101   ALT (SGPT) U/L 8      Results from last 7 days   Lab Units 24  0101   CREATININE mg/dL 0.48*      Results from last 7 days   Lab Units 24  0101   BILIRUBIN mg/dL 0.3     Non Stress Test: minutes 20  non-stress test: NST: Reactive  indication: Questionable Labor     Assessment  1.   38 y.o. yo female  at 32w1d  2.  contractions with history of 29 wk delivery in prev preg, no current  evidence of NOEMI, now s/p steroids  3. Placenta previa. No vaginal bleeding.   4. Elevated fetal cord dopplers, per PDC needs FU dopplers in 1 week. Start twice weekly monitoring.   5. A1DM- cont FSBS 4x daily.    Plan  Ok for DC home in stable condition. NST Friday in office, and PDC US on Tuesday.     This note has been electronically signed.    Itzel Damon MD  2024    Electronically signed by Itzel Damon MD at 24 1016

## 2024-07-26 ENCOUNTER — ROUTINE PRENATAL (OUTPATIENT)
Dept: OBSTETRICS AND GYNECOLOGY | Facility: CLINIC | Age: 39
End: 2024-07-26
Payer: COMMERCIAL

## 2024-07-26 VITALS — WEIGHT: 205.8 LBS | SYSTOLIC BLOOD PRESSURE: 118 MMHG | DIASTOLIC BLOOD PRESSURE: 72 MMHG | BODY MASS INDEX: 33.22 KG/M2

## 2024-07-26 DIAGNOSIS — Z98.891 PREVIOUS CESAREAN SECTION: ICD-10-CM

## 2024-07-26 DIAGNOSIS — Z87.51 HISTORY OF PRETERM DELIVERY: ICD-10-CM

## 2024-07-26 DIAGNOSIS — Z90.3 HISTORY OF SLEEVE GASTRECTOMY: ICD-10-CM

## 2024-07-26 DIAGNOSIS — O60.03 PRETERM LABOR IN THIRD TRIMESTER WITHOUT DELIVERY: ICD-10-CM

## 2024-07-26 DIAGNOSIS — O44.00 PLACENTA PREVIA ANTEPARTUM: ICD-10-CM

## 2024-07-26 DIAGNOSIS — O09.529 ANTEPARTUM MULTIGRAVIDA OF ADVANCED MATERNAL AGE: ICD-10-CM

## 2024-07-26 DIAGNOSIS — G43.109 MIGRAINE WITH AURA AND WITHOUT STATUS MIGRAINOSUS, NOT INTRACTABLE: Primary | ICD-10-CM

## 2024-07-26 DIAGNOSIS — Z34.93 PRENATAL CARE IN THIRD TRIMESTER: ICD-10-CM

## 2024-07-26 DIAGNOSIS — Z3A.32 32 WEEKS GESTATION OF PREGNANCY: ICD-10-CM

## 2024-07-26 LAB
GLUCOSE UR STRIP-MCNC: NEGATIVE MG/DL
PROT UR STRIP-MCNC: NEGATIVE MG/DL

## 2024-07-26 NOTE — PROGRESS NOTES
OB FOLLOW UP  CC- Here for care of pregnancy        Ely De La Garza is a 38 y.o.  32w4d patient being seen today for her obstetrical follow up visit. Patient reports swelling in lower extremities without pitting. She reports nausea and vomiting, she states that she vomited 7 times yesterday. She also reports contractions yesterday occurring every 20 minutes, denies contractions today.     Patient was admitted to L&D this past Saturday until Tuesday for  labor. She received Magnesium and Betamethasone.      Her prenatal care is complicated by (and status) :  see below.  Patient Active Problem List   Diagnosis    Depression    Polycystic ovaries    Migraine with aura and without status migrainosus, not intractable    History of sleeve gastrectomy    Pregnancy    Previous  section    Antepartum multigravida of advanced maternal age    History of  delivery    Placenta previa antepartum    Rubella non-immune status, antepartum     labor in third trimester without delivery       TDAP status: received at last visit  Rhogam status: was not indicated  28 week labs: Reviewed  Ultrasound Today: No  Non Stress Test: Yes minutes 20  non-stress test: NST: Reactive  indication:  Elevated dopplers      ROS -   Patient Denies: Loss of Fluid, Vaginal Spotting, Vision Changes, Epigastric pain, and skin itching  Fetal Movement : normal  All other systems reviewed and are negative.       The additional following portions of the patient's history were reviewed and updated as appropriate: allergies, current medications, past family history, past medical history, past social history, past surgical history, and problem list.    I have reviewed and agree with the HPI, ROS, and historical information as entered above. Itzel Damon MD      /72   Wt 93.4 kg (205 lb 12.8 oz)   LMP 2023   BMI 33.22 kg/m²         EXAM:     Prenatal Vitals  BP: 118/72  Weight: 93.4 kg (205 lb 12.8 oz)    Fetal Heart Rate: NST               Urine Glucose Read-only: Negative  Urine Protein Read-only: Negative           Assessment and Plan    Problem List Items Addressed This Visit          Gynecologic and Obstetric Problems    Placenta previa antepartum    Overview     Posterior marginal 20 wks, persistent 24 wks.             Other    Migraine with aura and without status migrainosus, not intractable - Primary    Overview     Previously managed with topiramate.  Stopped due to pregnancy. Will begin mag oxide 400 mg daily and Benadryl as needed. Referral to neurology          Relevant Medications    buPROPion XL (WELLBUTRIN XL) 300 MG 24 hr tablet    FLUoxetine (PROzac) 10 MG capsule    acetaminophen (TYLENOL) 500 MG tablet    History of sleeve gastrectomy    Overview     >350 lb weight loss, history HTN, DM prior to this, normal since  Baby asa         Pregnancy    Overview     G1- 29 wk c/s, presented with bulging bag, then pprom. Possible incompetent cervix.   Vaginal progesterone started 16 wks, CL q 2 weeks starting 16 wks.   Elevated cord dopplers 32 wks, PDC Tues, NST here Friday         Previous  section    Overview     LTUI- op report reviewed           Antepartum multigravida of advanced maternal age    Overview     cfDNA low risk         History of  delivery     labor in third trimester without delivery    Overview     S/p steroids 32 wks          Other Visit Diagnoses       Prenatal care in third trimester        Relevant Orders    POC Urinalysis Dipstick (Completed)    Fetal Nonstress Test            Pregnancy at 32w4d. NST today reactive. Has FU dopplers with PDC on Tuesday. No further evidence of NOEMI, or bleeding. Is s/p steorids.  Fetal status reassuring.  28 week labs reviewed.    Activity and Exercise discussed.  Fetal movement/PTL or Labor precautions  Return in about 1 week (around 2024) for F/U Prenatal, NST Next Visit.    Itzel Damon MD  2024

## 2024-07-30 ENCOUNTER — OFFICE VISIT (OUTPATIENT)
Dept: OBSTETRICS AND GYNECOLOGY | Facility: HOSPITAL | Age: 39
End: 2024-07-30
Payer: COMMERCIAL

## 2024-07-30 ENCOUNTER — HOSPITAL ENCOUNTER (OUTPATIENT)
Dept: WOMENS IMAGING | Facility: HOSPITAL | Age: 39
Discharge: HOME | End: 2024-07-30
Admitting: OBSTETRICS & GYNECOLOGY
Payer: COMMERCIAL

## 2024-07-30 VITALS — DIASTOLIC BLOOD PRESSURE: 70 MMHG | BODY MASS INDEX: 33.09 KG/M2 | SYSTOLIC BLOOD PRESSURE: 123 MMHG | WEIGHT: 205 LBS

## 2024-07-30 DIAGNOSIS — O44.00 PLACENTA PREVIA ANTEPARTUM: Primary | ICD-10-CM

## 2024-07-30 DIAGNOSIS — Z98.891 PREVIOUS CESAREAN SECTION: ICD-10-CM

## 2024-07-30 DIAGNOSIS — Z34.90 PREGNANCY, UNSPECIFIED GESTATIONAL AGE: ICD-10-CM

## 2024-07-30 DIAGNOSIS — O09.529 ANTEPARTUM MULTIGRAVIDA OF ADVANCED MATERNAL AGE: ICD-10-CM

## 2024-07-30 DIAGNOSIS — O44.00 PLACENTA PREVIA ANTEPARTUM: ICD-10-CM

## 2024-07-30 PROCEDURE — 76819 FETAL BIOPHYS PROFIL W/O NST: CPT

## 2024-07-30 PROCEDURE — 76819 FETAL BIOPHYS PROFIL W/O NST: CPT | Performed by: OBSTETRICS & GYNECOLOGY

## 2024-07-30 NOTE — LETTER
"2024     Itzel Damon MD  [address]  [address]  [facility] [id number]    Patient: Ely De La Garza   YOB: 1985   Date of Visit: 2024     Dear Itzel Damon MD:       Thank you for referring Ely De La Garza to me for evaluation. Below are the relevant portions of my assessment and plan of care.    If you have questions, please do not hesitate to call me. I look forward to following Ely along with you.         Sincerely,        Douglas A. Milligan, MD        CC: No Recipients    Milligan, Douglas A, MD  [id number] [phone]  Sign when Signing Visit      Maternal/Fetal Medicine Follow Up  Note     Name: Ely De La Garza    : 1985     MRN: [id number]     Referring Provider: Itzel Damon MD    Chief Complaint  placenta previa, AMA    Subjective     History of Present Illness:  Ely De La Garza is a 38 y.o.  33w1d who presents today for previa    KYLE: Estimated Date of Delivery: 24     ROS:   As noted in HPI.     Objective     Vital Signs  /70   Wt 93 kg (205 lb)   LMP 2023   Estimated body mass index is 33.09 kg/m² as calculated from the following:    Height as of 24: 167.6 cm (66\").    Weight as of this encounter: 93 kg (205 lb).    Physical Exam    Ultrasound Impression:   See Viewpoint    Assessment and Plan     Ely De La Garza is a 38 y.o.  33w1d who presents today for previa    Diagnoses and all orders for this visit:    1. Placenta previa antepartum (Primary)  Assessment & Plan:  Patient returns today for follow-up for pregnancy complicated by placenta previa.  Patient notes no vaginal bleeding and no regular contractions.  She notes good fetal movement.        Ultrasound today demonstrates a vertex infant.  Amniotic fluid volume and umbilical artery Dopplers are normal BPP is 8 out of 8.    Placenta is anterior right lateral and is a complete previa.  No evidence of accreta or percreta is seen today, however view of " the right lateral edge of the placenta is difficult due to angles involved.    We would recommend  delivery at 36 weeks gestation.    Patient was counseled extensively regarding fetal movement will contact her provider immediately if she notices any decreased fetal movement additionally the patient will contact her provider immediately if she notices any vaginal bleeding or regular uterine contractions even if mild.      2. Antepartum multigravida of advanced maternal age    3. Previous  section    4. Pregnancy, unspecified gestational age         Follow Up  No follow-ups on file.    I spent 10 minutes caring for the patient on the day of service. This included: obtaining or reviewing a separately obtained medical history, reviewing patient records, performing a medically appropriate exam and/or evaluation, counseling or educating the patient/family/caregiver, ordering medications, labs, and/or procedures and documenting such in the medical record. This does not include time spent on review and interpretation of other tests such as fetal ultrasound or the performance of other procedures such as amniocentesis or CVS.      Douglas A. Milligan, MD  Maternal Fetal Medicine, [facility]     2024

## 2024-07-30 NOTE — ASSESSMENT & PLAN NOTE
Patient returns today for follow-up for pregnancy complicated by placenta previa.  Patient notes no vaginal bleeding and no regular contractions.  She notes good fetal movement.        Ultrasound today demonstrates a vertex infant.  Amniotic fluid volume and umbilical artery Dopplers are normal BPP is 8 out of 8.    Placenta is anterior right lateral and is a complete previa.  No evidence of accreta or percreta is seen today, however view of the right lateral edge of the placenta is difficult due to angles involved.    We would recommend  delivery at 36 weeks gestation.    Patient was counseled extensively regarding fetal movement will contact her provider immediately if she notices any decreased fetal movement additionally the patient will contact her provider immediately if she notices any vaginal bleeding or regular uterine contractions even if mild.

## 2024-07-30 NOTE — PROGRESS NOTES
"    Maternal/Fetal Medicine Follow Up  Note     Name: Ely De La Garza    : 1985     MRN: [id number]     Referring Provider: Itzel Damon MD    Chief Complaint  placenta previa, AMA    Subjective     History of Present Illness:  Ely De La Garza is a 38 y.o.  33w1d who presents today for previa    KYLE: Estimated Date of Delivery: 24     ROS:   As noted in HPI.     Objective     Vital Signs  /70   Wt 93 kg (205 lb)   LMP 2023   Estimated body mass index is 33.09 kg/m² as calculated from the following:    Height as of 24: 167.6 cm (66\").    Weight as of this encounter: 93 kg (205 lb).    Physical Exam    Ultrasound Impression:   See Viewpoint    Assessment and Plan     Ely De La Garza is a 38 y.o.  33w1d who presents today for previa    Diagnoses and all orders for this visit:    1. Placenta previa antepartum (Primary)  Assessment & Plan:  Patient returns today for follow-up for pregnancy complicated by placenta previa.  Patient notes no vaginal bleeding and no regular contractions.  She notes good fetal movement.        Ultrasound today demonstrates a vertex infant.  Amniotic fluid volume and umbilical artery Dopplers are normal BPP is 8 out of 8.    Placenta is anterior right lateral and is a complete previa.  No evidence of accreta or percreta is seen today, however view of the right lateral edge of the placenta is difficult due to angles involved.    We would recommend  delivery at 36 weeks gestation.    Patient was counseled extensively regarding fetal movement will contact her provider immediately if she notices any decreased fetal movement additionally the patient will contact her provider immediately if she notices any vaginal bleeding or regular uterine contractions even if mild.      2. Antepartum multigravida of advanced maternal age    3. Previous  section    4. Pregnancy, unspecified gestational age         Follow Up  No " follow-ups on file.    I spent 10 minutes caring for the patient on the day of service. This included: obtaining or reviewing a separately obtained medical history, reviewing patient records, performing a medically appropriate exam and/or evaluation, counseling or educating the patient/family/caregiver, ordering medications, labs, and/or procedures and documenting such in the medical record. This does not include time spent on review and interpretation of other tests such as fetal ultrasound or the performance of other procedures such as amniocentesis or CVS.      Douglas A. Milligan, MD  Maternal Fetal Medicine, [facility]     2024

## 2024-08-02 ENCOUNTER — ROUTINE PRENATAL (OUTPATIENT)
Dept: OBSTETRICS AND GYNECOLOGY | Facility: CLINIC | Age: 39
End: 2024-08-02
Payer: COMMERCIAL

## 2024-08-02 VITALS — WEIGHT: 204.8 LBS | SYSTOLIC BLOOD PRESSURE: 126 MMHG | BODY MASS INDEX: 33.06 KG/M2 | DIASTOLIC BLOOD PRESSURE: 72 MMHG

## 2024-08-02 DIAGNOSIS — O44.00 PLACENTA PREVIA ANTEPARTUM: ICD-10-CM

## 2024-08-02 DIAGNOSIS — Z34.83 PRENATAL CARE, SUBSEQUENT PREGNANCY, THIRD TRIMESTER: ICD-10-CM

## 2024-08-02 DIAGNOSIS — K21.9 GASTROESOPHAGEAL REFLUX DISEASE, UNSPECIFIED WHETHER ESOPHAGITIS PRESENT: ICD-10-CM

## 2024-08-02 DIAGNOSIS — G43.109 MIGRAINE WITH AURA AND WITHOUT STATUS MIGRAINOSUS, NOT INTRACTABLE: Primary | ICD-10-CM

## 2024-08-02 LAB
GLUCOSE UR STRIP-MCNC: NEGATIVE MG/DL
PROT UR STRIP-MCNC: NEGATIVE MG/DL

## 2024-08-02 RX ORDER — LANSOPRAZOLE 30 MG/1
30 CAPSULE, DELAYED RELEASE ORAL DAILY
Qty: 30 CAPSULE | Refills: 1 | Status: SHIPPED | OUTPATIENT
Start: 2024-08-02

## 2024-08-02 NOTE — PROGRESS NOTES
OB FOLLOW UP  CC- Here for care of pregnancy        Ely De La Garza is a 38 y.o.  33w4d patient being seen today for her obstetrical follow up visit. Patient reports intermittent contractions, lasted 30 minutes(5 minutes apart), dark brown discharge after wiping today for urine sample, blurred vision x1, (hx of migraines) since last visit, feet swelling only at night-elevation resolves, and heartburn with vomiting. Patient reports heart burn is so bad that pepcid or TUMS do not help.      Her prenatal care is complicated by (and status) : see below.  Patient Active Problem List   Diagnosis    Depression    Polycystic ovaries    Migraine with aura and without status migrainosus, not intractable    History of sleeve gastrectomy    Pregnancy    Previous  section    Antepartum multigravida of advanced maternal age    History of  delivery    Placenta previa antepartum    Rubella non-immune status, antepartum     labor in third trimester without delivery         Ultrasound Today: No  Non Stress Test: Yes minutes 20+  non-stress test: NST: Reactive  indication: previa    ROS -   Patient Denies: Loss of Fluid, Nausea , Epigastric pain, and skin itching  Fetal Movement : normal  All other systems reviewed and are negative.       The additional following portions of the patient's history were reviewed and updated as appropriate: allergies and current medications.    I have reviewed and agree with the HPI, ROS, and historical information as entered above. Huong Dwyer, APRN      /72   Wt 92.9 kg (204 lb 12.8 oz)   LMP 2023   BMI 33.06 kg/m²       EXAM:     Prenatal Vitals  BP: 126/72  Weight: 92.9 kg (204 lb 12.8 oz)   Fetal Heart Rate: NST               Urine Glucose Read-only: Negative  Urine Protein Read-only: Negative           Assessment and Plan    Problem List Items Addressed This Visit          Gravid and     Placenta previa antepartum    Overview      Posterior marginal 20 wks, persistent 24 wks.             Neuro    Migraine with aura and without status migrainosus, not intractable - Primary    Overview     Previously managed with topiramate.  Stopped due to pregnancy. Will begin mag oxide 400 mg daily and Benadryl as needed. Referral to neurology          Relevant Medications    buPROPion XL (WELLBUTRIN XL) 300 MG 24 hr tablet    FLUoxetine (PROzac) 10 MG capsule    acetaminophen (TYLENOL) 500 MG tablet     Other Visit Diagnoses       Prenatal care, subsequent pregnancy, third trimester        Relevant Orders    POC Urinalysis Dipstick (Completed)    Gastroesophageal reflux disease, unspecified whether esophagitis present        Relevant Medications    lansoprazole (Prevacid) 30 MG capsule            Pregnancy at 33w4d  Fetal status reassuring.   Activity and Exercise discussed.  Fetal movement/PTL or Labor precautions  Reviewed Pre-eclampsia signs/symptoms.  Pelvic rest, no lifting.  PDC rec del at 36 wks  To Triage prn bleeding/ctxs    Huong Dwyer, APRN  08/02/2024

## 2024-08-06 ENCOUNTER — TELEPHONE (OUTPATIENT)
Dept: OBSTETRICS AND GYNECOLOGY | Facility: CLINIC | Age: 39
End: 2024-08-06
Payer: COMMERCIAL

## 2024-08-09 ENCOUNTER — HOSPITAL ENCOUNTER (OUTPATIENT)
Facility: HOSPITAL | Age: 39
Discharge: HOME | End: 2024-08-09
Attending: OBSTETRICS & GYNECOLOGY | Admitting: OBSTETRICS & GYNECOLOGY
Payer: COMMERCIAL

## 2024-08-09 ENCOUNTER — DOCUMENTATION (OUTPATIENT)
Dept: OBSTETRICS AND GYNECOLOGY | Facility: CLINIC | Age: 39
End: 2024-08-09

## 2024-08-09 ENCOUNTER — ROUTINE PRENATAL (OUTPATIENT)
Dept: OBSTETRICS AND GYNECOLOGY | Facility: CLINIC | Age: 39
End: 2024-08-09
Payer: COMMERCIAL

## 2024-08-09 VITALS — SYSTOLIC BLOOD PRESSURE: 122 MMHG | BODY MASS INDEX: 33.57 KG/M2 | WEIGHT: 208 LBS | DIASTOLIC BLOOD PRESSURE: 72 MMHG

## 2024-08-09 VITALS
SYSTOLIC BLOOD PRESSURE: 131 MMHG | WEIGHT: 208 LBS | HEART RATE: 84 BPM | TEMPERATURE: 98.1 F | DIASTOLIC BLOOD PRESSURE: 76 MMHG | OXYGEN SATURATION: 98 % | RESPIRATION RATE: 14 BRPM | HEIGHT: 66 IN | BODY MASS INDEX: 33.43 KG/M2

## 2024-08-09 DIAGNOSIS — G43.109 MIGRAINE WITH AURA AND WITHOUT STATUS MIGRAINOSUS, NOT INTRACTABLE: Primary | ICD-10-CM

## 2024-08-09 DIAGNOSIS — Z28.39 RUBELLA NON-IMMUNE STATUS, ANTEPARTUM: ICD-10-CM

## 2024-08-09 DIAGNOSIS — F33.42 RECURRENT MAJOR DEPRESSIVE DISORDER, IN FULL REMISSION: ICD-10-CM

## 2024-08-09 DIAGNOSIS — Z3A.32 32 WEEKS GESTATION OF PREGNANCY: ICD-10-CM

## 2024-08-09 DIAGNOSIS — Z98.891 PREVIOUS CESAREAN SECTION: ICD-10-CM

## 2024-08-09 DIAGNOSIS — O09.529 ANTEPARTUM MULTIGRAVIDA OF ADVANCED MATERNAL AGE: ICD-10-CM

## 2024-08-09 DIAGNOSIS — Z34.93 PRENATAL CARE IN THIRD TRIMESTER: ICD-10-CM

## 2024-08-09 DIAGNOSIS — Z90.3 HISTORY OF SLEEVE GASTRECTOMY: ICD-10-CM

## 2024-08-09 DIAGNOSIS — O09.899 RUBELLA NON-IMMUNE STATUS, ANTEPARTUM: ICD-10-CM

## 2024-08-09 DIAGNOSIS — O44.00 PLACENTA PREVIA ANTEPARTUM: ICD-10-CM

## 2024-08-09 PROBLEM — O36.8390 FETAL TACHYCARDIA AFFECTING MANAGEMENT OF MOTHER: Status: ACTIVE | Noted: 2024-08-09

## 2024-08-09 LAB
ALBUMIN SERPL-MCNC: 3.3 G/DL (ref 3.5–5.2)
ALBUMIN/GLOB SERPL: 1.3 G/DL
ALP SERPL-CCNC: 117 U/L (ref 39–117)
ALT SERPL W P-5'-P-CCNC: 8 U/L (ref 1–33)
ANION GAP SERPL CALCULATED.3IONS-SCNC: 10 MMOL/L (ref 5–15)
AST SERPL-CCNC: 14 U/L (ref 1–32)
BILE AC SERPL-SCNC: 5 UMOL/L (ref 0–10)
BILIRUB SERPL-MCNC: 0.5 MG/DL (ref 0–1.2)
BUN SERPL-MCNC: 5 MG/DL (ref 6–20)
BUN/CREAT SERPL: 7.7 (ref 7–25)
CALCIUM SPEC-SCNC: 8 MG/DL (ref 8.6–10.5)
CHLORIDE SERPL-SCNC: 107 MMOL/L (ref 98–107)
CO2 SERPL-SCNC: 22 MMOL/L (ref 22–29)
CREAT SERPL-MCNC: 0.65 MG/DL (ref 0.57–1)
EGFRCR SERPLBLD CKD-EPI 2021: 115 ML/MIN/1.73
GLOBULIN UR ELPH-MCNC: 2.6 GM/DL
GLUCOSE SERPL-MCNC: 121 MG/DL (ref 65–99)
GLUCOSE UR STRIP-MCNC: NEGATIVE MG/DL
POTASSIUM SERPL-SCNC: 3.4 MMOL/L (ref 3.5–5.2)
PROT SERPL-MCNC: 5.9 G/DL (ref 6–8.5)
PROT UR STRIP-MCNC: NEGATIVE MG/DL
SODIUM SERPL-SCNC: 139 MMOL/L (ref 136–145)

## 2024-08-09 PROCEDURE — 82239 BILE ACIDS TOTAL: CPT | Performed by: OBSTETRICS & GYNECOLOGY

## 2024-08-09 PROCEDURE — 80053 COMPREHEN METABOLIC PANEL: CPT | Performed by: OBSTETRICS & GYNECOLOGY

## 2024-08-09 PROCEDURE — 36415 COLL VENOUS BLD VENIPUNCTURE: CPT | Performed by: OBSTETRICS & GYNECOLOGY

## 2024-08-09 NOTE — PROGRESS NOTES
Patient advised of c/s date/time and arrival time for PAT same day as c/s. 8/20/2024 arrival at 8:30.

## 2024-08-09 NOTE — PROGRESS NOTES
Laborist    NST/prolonged fetal monitor  Indications fetal tachycardia  Interpretation: Reactive, initial fetal baseline 150-160 now 130s to 140s, accelerations 15 x 15, no fetal decelerations, moderate variability, onset 1548,  endtime 1708, no regular contractions noted.    Plan discharge to home,  labor instructions, pelvic rest,  scheduled for 36 weeks gestation due to previa.  Continue twice-weekly testing.  Questions answered.  Dr. Wallace notified.

## 2024-08-09 NOTE — PROGRESS NOTES
OB FOLLOW UP  CC- Here for care of pregnancy        Ely De La Garza is a 38 y.o.  34w4d patient being seen today for her obstetrical follow up visit. Patient reports occasional ocular migraines that improve with Magnesium, Tylenol, and rest. She states that she has had migraines prior to pregnancy. She reports mild intermittent cramping and infrequent contractions. Patient also reports itching in hands and feet that started within the past week.     Patient reports that fasting BS have been averaging 80's-90's, two hour post meal BS average 80's-110.    Her prenatal care is complicated by (and status) : see below.  Patient Active Problem List   Diagnosis    Depression    Polycystic ovaries    Migraine with aura and without status migrainosus, not intractable    History of sleeve gastrectomy    Pregnancy    Previous  section    Antepartum multigravida of advanced maternal age    History of  delivery    Placenta previa antepartum    Rubella non-immune status, antepartum     labor in third trimester without delivery       Ultrasound Today: No  Fetal Non-Stress Test  Patient: Ely De La Garza  : 1985  MRN: [id number]  CSN: [id number]  Date: 2024    Estimated Date of Delivery: 24  Gestational Age: 34w4d    Indication for NST Elevated dopplers       Non Stress Test: minutes 20  non-stress test:  reactive, but baseline 170s  indication:  abnormal dopplers    ROS -   Patient Denies: Loss of Fluid, Vaginal Spotting, Vision Changes, Epigastric pain, and skin itching  Fetal Movement : normal  All other systems reviewed and are negative.       The additional following portions of the patient's history were reviewed and updated as appropriate: allergies, current medications, past family history, past medical history, past social history, past surgical history, and problem list.    I have reviewed and agree with the HPI, ROS, and historical information as entered  above. Itzel Damon MD      /72   Wt 94.3 kg (208 lb)   LMP 2023   BMI 33.57 kg/m²       EXAM:     Prenatal Vitals  BP: 122/72  Weight: 94.3 kg (208 lb)   Fetal Heart Rate: NST               Urine Glucose Read-only: Negative  Urine Protein Read-only: Negative           Assessment and Plan    Problem List Items Addressed This Visit          Gynecologic and Obstetric Problems    Placenta previa antepartum    Overview     Posterior marginal 20 wks, persistent 24 wks.             Other    Depression    Overview     On vraylar, wellbutrin and prozac nob         Relevant Medications    buPROPion XL (WELLBUTRIN XL) 300 MG 24 hr tablet    Vraylar 1.5 MG capsule capsule    FLUoxetine (PROzac) 10 MG capsule    Migraine with aura and without status migrainosus, not intractable - Primary    Overview     Previously managed with topiramate.  Stopped due to pregnancy. Will begin mag oxide 400 mg daily and Benadryl as needed. Referral to neurology          Relevant Medications    buPROPion XL (WELLBUTRIN XL) 300 MG 24 hr tablet    FLUoxetine (PROzac) 10 MG capsule    acetaminophen (TYLENOL) 500 MG tablet    History of sleeve gastrectomy    Overview     >350 lb weight loss, history HTN, DM prior to this, normal since  Baby asa         Pregnancy    Overview     G1- 29 wk c/s, presented with bulging bag, then pprom. Possible incompetent cervix.   Vaginal progesterone started 16 wks, CL q 2 weeks starting 16 wks.   Elevated cord dopplers 32 wks, PDC Tues, NST here Friday         Previous  section    Overview     LTUI- op report reviewed           Antepartum multigravida of advanced maternal age    Overview     cfDNA low risk         Rubella non-immune status, antepartum     Other Visit Diagnoses       Prenatal care in third trimester        Relevant Orders    POC Urinalysis Dipstick (Completed)    Bile Acids, Total    Comprehensive Metabolic Panel    Fetal Nonstress Test            Pregnancy at 34w4d. NST today  reactive, but tachycardic.- will send to l&d for extended monitoring.  Cont twice weekly for elevated cord dopplers. Her repeat c/s is sched for 36 wks for previa. Check bile acids today.   Fetal status reassuring.   Activity and Exercise discussed.  Fetal movement/PTL or Labor precautions  Return in about 4 days (around 8/13/2024) for F/U Prenatal, NST Next Visit.    Itzel Damon MD  08/09/2024

## 2024-08-13 ENCOUNTER — LAB (OUTPATIENT)
Dept: LAB | Facility: HOSPITAL | Age: 39
End: 2024-08-13
Payer: COMMERCIAL

## 2024-08-13 ENCOUNTER — ROUTINE PRENATAL (OUTPATIENT)
Dept: OBSTETRICS AND GYNECOLOGY | Facility: CLINIC | Age: 39
End: 2024-08-13
Payer: COMMERCIAL

## 2024-08-13 VITALS — DIASTOLIC BLOOD PRESSURE: 72 MMHG | WEIGHT: 209 LBS | SYSTOLIC BLOOD PRESSURE: 118 MMHG | BODY MASS INDEX: 33.73 KG/M2

## 2024-08-13 DIAGNOSIS — O24.410 DIET CONTROLLED GESTATIONAL DIABETES MELLITUS (GDM), ANTEPARTUM: ICD-10-CM

## 2024-08-13 DIAGNOSIS — Z98.891 PREVIOUS CESAREAN SECTION: ICD-10-CM

## 2024-08-13 DIAGNOSIS — L29.9 ITCHING: ICD-10-CM

## 2024-08-13 DIAGNOSIS — Z87.51 HISTORY OF PRETERM DELIVERY: ICD-10-CM

## 2024-08-13 DIAGNOSIS — Z34.90 PRENATAL CARE, ANTEPARTUM: Primary | ICD-10-CM

## 2024-08-13 DIAGNOSIS — O09.529 ANTEPARTUM MULTIGRAVIDA OF ADVANCED MATERNAL AGE: ICD-10-CM

## 2024-08-13 DIAGNOSIS — Z90.3 HISTORY OF SLEEVE GASTRECTOMY: ICD-10-CM

## 2024-08-13 DIAGNOSIS — R51.9 NONINTRACTABLE HEADACHE, UNSPECIFIED CHRONICITY PATTERN, UNSPECIFIED HEADACHE TYPE: ICD-10-CM

## 2024-08-13 LAB
GLUCOSE UR STRIP-MCNC: ABNORMAL MG/DL
PROT UR STRIP-MCNC: NEGATIVE MG/DL

## 2024-08-13 PROCEDURE — 87081 CULTURE SCREEN ONLY: CPT

## 2024-08-13 NOTE — PROGRESS NOTES
OB FOLLOW UP  CC- Here for care of pregnancy        Ely De La Garza is a 38 y.o.  35w1d patient being seen today for her obstetrical follow up visit. Patient reports her average fasting BG is 88. Her average 2hr PP BG is 110..     Her prenatal care is complicated by (and status) : see below.  Patient Active Problem List   Diagnosis    Depression    Polycystic ovaries    Migraine with aura and without status migrainosus, not intractable    History of sleeve gastrectomy    Pregnancy    Previous  section    Antepartum multigravida of advanced maternal age    History of  delivery    Placenta previa antepartum    Rubella non-immune status, antepartum     labor in third trimester without delivery    Fetal tachycardia affecting management of mother    Diet controlled gestational diabetes mellitus (GDM), antepartum       Flu Status: Declines  Ultrasound Today: No  Non Stress Test: Yes minutes 30  non-stress test: NST: Reactive  indication:  GDM; AMA; Placenta previa  category: Category I    ROS -   Patient Denies: Loss of Fluid, Vaginal Spotting, Vision Changes, Headaches, Nausea , Vomiting , Contractions, Epigastric pain, and skin itching  Fetal Movement : normal  All other systems reviewed and are negative.       The additional following portions of the patient's history were reviewed and updated as appropriate: allergies, current medications, past medical history, past social history, past surgical history, and problem list.    I have reviewed and agree with the HPI, ROS, and historical information as entered above. Tiny Yuan, APRN      /72   Wt 94.8 kg (209 lb)   LMP 2023   BMI 33.73 kg/m²       EXAM:     Prenatal Vitals  BP: 118/72  Weight: 94.8 kg (209 lb)   Fetal Heart Rate: NST               Urine Glucose Read-only: (!) Trace  Urine Protein Read-only: Negative           Assessment and Plan    Problem List Items Addressed This Visit          Endocrine and  Metabolic    History of sleeve gastrectomy    Overview     >350 lb weight loss, history HTN, DM prior to this, normal since  Baby asa         Diet controlled gestational diabetes mellitus (GDM), antepartum    Overview     Failed 1 hour gtt  Did not tolerate 3 hour gtt  Treating as gestational diabetic with finger sticks and diet monitoring.  Hx type 2 diabetes before gastric sleeve.            Gravid and     Previous  section    Overview     LTUI- op report reviewed           Antepartum multigravida of advanced maternal age    Overview     cfDNA low risk         History of  delivery     Other Visit Diagnoses       Prenatal care, antepartum    -  Primary    Relevant Orders    POC Urinalysis Dipstick (Completed)    Group B Streptococcus Culture - Swab, Vaginal/Rectum    Nonintractable headache, unspecified chronicity pattern, unspecified headache type        Relevant Orders    AlP+ALT+AST+Creat+LD+TBili+..    Itching        Relevant Orders    Bile Acids, Total            Pregnancy at 35w1d  Fetal status reassuring. 20 min NST 15 x 15 reactive, Cat 1, No contractions or decelerations noted.   Activity and Exercise discussed.  Fetal movement/PTL or Labor precautions  Lab(s) Ordered  Patient is on Prenatal vitamins  Reviewed FSBS goals: fasting <90, 2hr PP < 120  Reviewed Pre-eclampsia signs/symptoms headache with relief from tylenol and still itching-recheck pep and bile acids.  GBS swab today.  Planned on Csection next Tuesday.  Return in about 3 days (around 2024) for NST.    Tiny Yuan, APRN  2024

## 2024-08-14 LAB
ALP SERPL-CCNC: 135 IU/L (ref 44–121)
ALT SERPL-CCNC: 7 IU/L (ref 0–32)
AST SERPL-CCNC: 13 IU/L (ref 0–40)
BASOPHILS # BLD AUTO: 0 X10E3/UL (ref 0–0.2)
BASOPHILS NFR BLD AUTO: 0 %
BILE AC SERPL-SCNC: 2.5 UMOL/L (ref 0–10)
BILIRUB SERPL-MCNC: 0.4 MG/DL (ref 0–1.2)
CREAT SERPL-MCNC: 0.65 MG/DL (ref 0.57–1)
EGFRCR SERPLBLD CKD-EPI 2021: 115 ML/MIN/1.73
EOSINOPHIL # BLD AUTO: 0.1 X10E3/UL (ref 0–0.4)
EOSINOPHIL NFR BLD AUTO: 1 %
ERYTHROCYTE [DISTWIDTH] IN BLOOD BY AUTOMATED COUNT: 12.3 % (ref 11.7–15.4)
HCT VFR BLD AUTO: 34.8 % (ref 34–46.6)
HGB BLD-MCNC: 11 G/DL (ref 11.1–15.9)
IMM GRANULOCYTES # BLD AUTO: 0.1 X10E3/UL (ref 0–0.1)
IMM GRANULOCYTES NFR BLD AUTO: 1 %
LDH SERPL L TO P-CCNC: 168 IU/L (ref 119–226)
LYMPHOCYTES # BLD AUTO: 1.3 X10E3/UL (ref 0.7–3.1)
LYMPHOCYTES NFR BLD AUTO: 21 %
MCH RBC QN AUTO: 28.3 PG (ref 26.6–33)
MCHC RBC AUTO-ENTMCNC: 31.6 G/DL (ref 31.5–35.7)
MCV RBC AUTO: 90 FL (ref 79–97)
MONOCYTES # BLD AUTO: 0.5 X10E3/UL (ref 0.1–0.9)
MONOCYTES NFR BLD AUTO: 7 %
NEUTROPHILS # BLD AUTO: 4.5 X10E3/UL (ref 1.4–7)
NEUTROPHILS NFR BLD AUTO: 70 %
PLATELET # BLD AUTO: 160 X10E3/UL (ref 150–450)
RBC # BLD AUTO: 3.89 X10E6/UL (ref 3.77–5.28)
URATE SERPL-MCNC: 3.6 MG/DL (ref 2.6–6.2)
WBC # BLD AUTO: 6.4 X10E3/UL (ref 3.4–10.8)

## 2024-08-16 ENCOUNTER — PREP FOR SURGERY (OUTPATIENT)
Dept: OTHER | Facility: HOSPITAL | Age: 39
End: 2024-08-16
Payer: COMMERCIAL

## 2024-08-16 ENCOUNTER — ROUTINE PRENATAL (OUTPATIENT)
Dept: OBSTETRICS AND GYNECOLOGY | Facility: CLINIC | Age: 39
End: 2024-08-16
Payer: COMMERCIAL

## 2024-08-16 VITALS — BODY MASS INDEX: 33.51 KG/M2 | WEIGHT: 207.6 LBS | SYSTOLIC BLOOD PRESSURE: 122 MMHG | DIASTOLIC BLOOD PRESSURE: 82 MMHG

## 2024-08-16 DIAGNOSIS — Z34.93 PRENATAL CARE IN THIRD TRIMESTER: Primary | ICD-10-CM

## 2024-08-16 DIAGNOSIS — O44.00 PLACENTA PREVIA ANTEPARTUM: ICD-10-CM

## 2024-08-16 DIAGNOSIS — F33.42 RECURRENT MAJOR DEPRESSIVE DISORDER, IN FULL REMISSION: ICD-10-CM

## 2024-08-16 DIAGNOSIS — Z90.3 HISTORY OF SLEEVE GASTRECTOMY: ICD-10-CM

## 2024-08-16 DIAGNOSIS — O24.410 DIET CONTROLLED GESTATIONAL DIABETES MELLITUS (GDM), ANTEPARTUM: ICD-10-CM

## 2024-08-16 DIAGNOSIS — O09.529 ANTEPARTUM MULTIGRAVIDA OF ADVANCED MATERNAL AGE: ICD-10-CM

## 2024-08-16 DIAGNOSIS — Z98.891 PREVIOUS CESAREAN SECTION: ICD-10-CM

## 2024-08-16 DIAGNOSIS — Z3A.35 35 WEEKS GESTATION OF PREGNANCY: ICD-10-CM

## 2024-08-16 DIAGNOSIS — O44.00 PLACENTA PREVIA ANTEPARTUM: Primary | ICD-10-CM

## 2024-08-16 DIAGNOSIS — G43.109 MIGRAINE WITH AURA AND WITHOUT STATUS MIGRAINOSUS, NOT INTRACTABLE: ICD-10-CM

## 2024-08-16 LAB
BACTERIA SPEC AEROBE CULT: NORMAL
GLUCOSE UR STRIP-MCNC: NEGATIVE MG/DL
PROT UR STRIP-MCNC: NEGATIVE MG/DL

## 2024-08-16 RX ORDER — BUPIVACAINE HCL/0.9 % NACL/PF 0.1 %
2 PLASTIC BAG, INJECTION (ML) EPIDURAL ONCE
OUTPATIENT
Start: 2024-08-16 | End: 2024-08-16

## 2024-08-16 RX ORDER — SODIUM CHLORIDE 9 MG/ML
40 INJECTION, SOLUTION INTRAVENOUS AS NEEDED
OUTPATIENT
Start: 2024-08-16

## 2024-08-16 RX ORDER — KETOROLAC TROMETHAMINE 30 MG/ML
30 INJECTION, SOLUTION INTRAMUSCULAR; INTRAVENOUS ONCE
OUTPATIENT
Start: 2024-08-16 | End: 2024-08-16

## 2024-08-16 RX ORDER — CARBOPROST TROMETHAMINE 250 UG/ML
250 INJECTION, SOLUTION INTRAMUSCULAR AS NEEDED
OUTPATIENT
Start: 2024-08-16

## 2024-08-16 RX ORDER — ACETAMINOPHEN 500 MG
1000 TABLET ORAL ONCE
OUTPATIENT
Start: 2024-08-16 | End: 2024-08-16

## 2024-08-16 RX ORDER — MISOPROSTOL 200 UG/1
800 TABLET ORAL AS NEEDED
OUTPATIENT
Start: 2024-08-16

## 2024-08-16 RX ORDER — SODIUM CHLORIDE 0.9 % (FLUSH) 0.9 %
10 SYRINGE (ML) INJECTION EVERY 12 HOURS SCHEDULED
OUTPATIENT
Start: 2024-08-16

## 2024-08-16 RX ORDER — LIDOCAINE HYDROCHLORIDE 10 MG/ML
0.5 INJECTION, SOLUTION EPIDURAL; INFILTRATION; INTRACAUDAL; PERINEURAL ONCE AS NEEDED
OUTPATIENT
Start: 2024-08-16

## 2024-08-16 RX ORDER — CITRIC ACID/SODIUM CITRATE 334-500MG
30 SOLUTION, ORAL ORAL ONCE
OUTPATIENT
Start: 2024-08-16 | End: 2024-08-16

## 2024-08-16 RX ORDER — OXYTOCIN/0.9 % SODIUM CHLORIDE 30/500 ML
85 PLASTIC BAG, INJECTION (ML) INTRAVENOUS ONCE
OUTPATIENT
Start: 2024-08-16 | End: 2024-08-16

## 2024-08-16 RX ORDER — OXYTOCIN/0.9 % SODIUM CHLORIDE 30/500 ML
650 PLASTIC BAG, INJECTION (ML) INTRAVENOUS ONCE
OUTPATIENT
Start: 2024-08-16 | End: 2024-08-16

## 2024-08-16 RX ORDER — SODIUM CHLORIDE 0.9 % (FLUSH) 0.9 %
10 SYRINGE (ML) INJECTION AS NEEDED
OUTPATIENT
Start: 2024-08-16

## 2024-08-16 RX ORDER — SODIUM CHLORIDE, SODIUM LACTATE, POTASSIUM CHLORIDE, CALCIUM CHLORIDE 600; 310; 30; 20 MG/100ML; MG/100ML; MG/100ML; MG/100ML
125 INJECTION, SOLUTION INTRAVENOUS CONTINUOUS
OUTPATIENT
Start: 2024-08-16

## 2024-08-16 RX ORDER — METHYLERGONOVINE MALEATE 0.2 MG/ML
200 INJECTION INTRAVENOUS ONCE AS NEEDED
OUTPATIENT
Start: 2024-08-16

## 2024-08-16 NOTE — PROGRESS NOTES
OB FOLLOW UP  CC- Here for care of pregnancy        Ely De La Garza is a 38 y.o.  35w4d patient being seen today for her obstetrical follow up visit. Patient reports having a severe headache this morning, she states having blurred vision right before headache started. She reports nausea and vomiting for the past two days and unable to keep food/drinks down even while taking Phenergan. She reports intermittent michel polk, back pain, and cramping. She has been using a heating pad at night for back pain with some relief. Patient reports swelling in lower extremities without pitting. She also reports RUQ and intense cramping in LLQ for the past two days. She describes LLQ pain as feeling like a charley horse and very uncomfortable.   She had bile acids and LFTs 3 days ago that were normal. She does have a long history of migraines.     Her prenatal care is complicated by (and status) : see below. and GDM diet controlled. Her fasting BG range has been in 80's. Her 2hr PP BG range has been 110-110's.  Patient Active Problem List   Diagnosis    Depression    Polycystic ovaries    Migraine with aura and without status migrainosus, not intractable    History of sleeve gastrectomy    Pregnancy    Previous  section    Antepartum multigravida of advanced maternal age    History of  delivery    Placenta previa antepartum    Rubella non-immune status, antepartum     labor in third trimester without delivery    Fetal tachycardia affecting management of mother    Diet controlled gestational diabetes mellitus (GDM), antepartum       GBS Status: Done Today. She is not allergic to PCN.    No Known Allergies       Her Delivery Plan is: Repeat . Scheduled    US today: no  Non Stress Test: Yes minutes 20  non-stress test: NST: Reactive  indication:  GDM and Elevated cord dopplers     ROS -   Patient Denies: Loss of Fluid, Vaginal Spotting, Vision Changes, Contractions, and Epigastric  pain  Fetal Movement : normal  All other systems reviewed and are negative.       The additional following portions of the patient's history were reviewed and updated as appropriate: allergies, current medications, past family history, past medical history, past social history, past surgical history, and problem list.    I have reviewed and agree with the HPI, ROS, and historical information as entered above. Itzel Damon MD        EXAM:     Prenatal Vitals  BP: 122/82  Weight: 94.2 kg (207 lb 9.6 oz)   Fetal Heart Rate: NST              Urine Glucose Read-only: Negative  Urine Protein Read-only: Negative           Assessment and Plan    Problem List Items Addressed This Visit          Gynecologic and Obstetric Problems    Placenta previa antepartum    Overview     Posterior marginal 20 wks, persistent 24 wks.          Diet controlled gestational diabetes mellitus (GDM), antepartum    Overview     Failed 1 hour gtt  Did not tolerate 3 hour gtt  Treating as gestational diabetic with finger sticks and diet monitoring.  Hx type 2 diabetes before gastric sleeve.            Other    Depression    Overview     On vraylar, wellbutrin and prozac nob         Migraine with aura and without status migrainosus, not intractable    Overview     Previously managed with topiramate.  Stopped due to pregnancy. Will begin mag oxide 400 mg daily and Benadryl as needed. Referral to neurology          Relevant Medications    acetaminophen (TYLENOL) 500 MG tablet    History of sleeve gastrectomy    Overview     >350 lb weight loss, history HTN, DM prior to this, normal since  Baby asa         Pregnancy    Overview     G1- 29 wk c/s, presented with bulging bag, then pprom. Possible incompetent cervix.   Vaginal progesterone started 16 wks, CL q 2 weeks starting 16 wks.   Elevated cord dopplers 32 wks, PDC Tu, NST here Friday         Previous  section    Overview     LTUI- op report reviewed           Antepartum multigravida of  advanced maternal age    Overview     cfDNA low risk          Other Visit Diagnoses       Prenatal care in third trimester    -  Primary    Relevant Orders    POC Urinalysis Dipstick (Completed)    Fetal Nonstress Test            Pregnancy at 35w4d. NST today reactive. BP normal. Urine neg. She had labs 3 days ago that were normal. Might be a  be a migraine, she will go home and rest and take meds/ hydrate, and if headache continues she will come to triage. She has also had some cramping, but no regular contractions or bleeding  She is sched for her repeat c/s on Tuesday d/t placenta previa. She has had no vaginal bleeding. Discussed increased risks of transfusion or hyst.   Fetal status reassuring.   Reviewed Pre-eclampsia signs/symptoms  Delivery options reviewed with patient  Signs of labor reviewed  Kick counts reviewed  Activity and Exercise discussed.  Return in about 4 days (around 8/20/2024) for F/U Prenatal, NST Next Visit.    Itzel Damon MD  08/16/2024

## 2024-08-20 ENCOUNTER — ANESTHESIA (OUTPATIENT)
Dept: LABOR AND DELIVERY | Facility: HOSPITAL | Age: 39
End: 2024-08-20
Payer: COMMERCIAL

## 2024-08-20 ENCOUNTER — HOSPITAL ENCOUNTER (INPATIENT)
Facility: HOSPITAL | Age: 39
LOS: 3 days | Discharge: HOME | End: 2024-08-23
Attending: OBSTETRICS & GYNECOLOGY | Admitting: OBSTETRICS & GYNECOLOGY
Payer: COMMERCIAL

## 2024-08-20 ENCOUNTER — ANESTHESIA EVENT (OUTPATIENT)
Dept: LABOR AND DELIVERY | Facility: HOSPITAL | Age: 39
End: 2024-08-20
Payer: COMMERCIAL

## 2024-08-20 DIAGNOSIS — Z98.891 PREVIOUS CESAREAN SECTION: ICD-10-CM

## 2024-08-20 DIAGNOSIS — Z30.2 STERILIZATION: ICD-10-CM

## 2024-08-20 DIAGNOSIS — O44.00 PLACENTA PREVIA ANTEPARTUM: ICD-10-CM

## 2024-08-20 DIAGNOSIS — Z98.891 S/P C-SECTION: Primary | ICD-10-CM

## 2024-08-20 DIAGNOSIS — O44.00 PLACENTA PREVIA, UNSPECIFIED TRIMESTER: ICD-10-CM

## 2024-08-20 LAB
ABO GROUP BLD: NORMAL
BLD GP AB SCN SERPL QL: NEGATIVE
DEPRECATED RDW RBC AUTO: 38.2 FL (ref 37–54)
ERYTHROCYTE [DISTWIDTH] IN BLOOD BY AUTOMATED COUNT: 12.6 % (ref 12.3–15.4)
GLUCOSE BLDC GLUCOMTR-MCNC: 95 MG/DL (ref 70–130)
HCT VFR BLD AUTO: 33.4 % (ref 34–46.6)
HGB BLD-MCNC: 11.1 G/DL (ref 12–15.9)
MCH RBC QN AUTO: 27.7 PG (ref 26.6–33)
MCHC RBC AUTO-ENTMCNC: 33.2 G/DL (ref 31.5–35.7)
MCV RBC AUTO: 83.3 FL (ref 79–97)
PLATELET # BLD AUTO: 155 10*3/MM3 (ref 140–450)
PMV BLD AUTO: 10.5 FL (ref 6–12)
RBC # BLD AUTO: 4.01 10*6/MM3 (ref 3.77–5.28)
RH BLD: POSITIVE
T&S EXPIRATION DATE: NORMAL
TREPONEMA PALLIDUM IGG+IGM AB [PRESENCE] IN SERUM OR PLASMA BY IMMUNOASSAY: NORMAL
WBC NRBC COR # BLD AUTO: 6.08 10*3/MM3 (ref 3.4–10.8)

## 2024-08-20 PROCEDURE — 25010000002 BUPIVACAINE IN DEXTROSE 0.75-8.25 % SOLUTION: Performed by: NURSE ANESTHETIST, CERTIFIED REGISTERED

## 2024-08-20 PROCEDURE — 86900 BLOOD TYPING SEROLOGIC ABO: CPT | Performed by: OBSTETRICS & GYNECOLOGY

## 2024-08-20 PROCEDURE — 86780 TREPONEMA PALLIDUM: CPT | Performed by: OBSTETRICS & GYNECOLOGY

## 2024-08-20 PROCEDURE — 25010000002 METHYLERGONOVINE MALEATE PER 0.2 MG: Performed by: NURSE ANESTHETIST, CERTIFIED REGISTERED

## 2024-08-20 PROCEDURE — 0UB70ZZ EXCISION OF BILATERAL FALLOPIAN TUBES, OPEN APPROACH: ICD-10-PCS | Performed by: OBSTETRICS & GYNECOLOGY

## 2024-08-20 PROCEDURE — 58611 LIGATE OVIDUCT(S) ADD-ON: CPT | Performed by: OBSTETRICS & GYNECOLOGY

## 2024-08-20 PROCEDURE — 25010000002 ONDANSETRON PER 1 MG: Performed by: NURSE ANESTHETIST, CERTIFIED REGISTERED

## 2024-08-20 PROCEDURE — 82948 REAGENT STRIP/BLOOD GLUCOSE: CPT

## 2024-08-20 PROCEDURE — 25010000002 CEFAZOLIN PER 500 MG: Performed by: OBSTETRICS & GYNECOLOGY

## 2024-08-20 PROCEDURE — 25010000002 FENTANYL CITRATE (PF) 100 MCG/2ML SOLUTION: Performed by: NURSE ANESTHETIST, CERTIFIED REGISTERED

## 2024-08-20 PROCEDURE — 25010000002 MORPHINE PER 10 MG: Performed by: NURSE ANESTHETIST, CERTIFIED REGISTERED

## 2024-08-20 PROCEDURE — 59510 CESAREAN DELIVERY: CPT | Performed by: OBSTETRICS & GYNECOLOGY

## 2024-08-20 PROCEDURE — 94799 UNLISTED PULMONARY SVC/PX: CPT

## 2024-08-20 PROCEDURE — 85027 COMPLETE CBC AUTOMATED: CPT | Performed by: OBSTETRICS & GYNECOLOGY

## 2024-08-20 PROCEDURE — 25010000002 METOCLOPRAMIDE PER 10 MG: Performed by: NURSE ANESTHETIST, CERTIFIED REGISTERED

## 2024-08-20 PROCEDURE — 25810000003 LACTATED RINGERS SOLUTION: Performed by: OBSTETRICS & GYNECOLOGY

## 2024-08-20 PROCEDURE — 86923 COMPATIBILITY TEST ELECTRIC: CPT

## 2024-08-20 PROCEDURE — 25010000002 MIDAZOLAM PER 1 MG: Performed by: NURSE ANESTHETIST, CERTIFIED REGISTERED

## 2024-08-20 PROCEDURE — 25010000002 KETOROLAC TROMETHAMINE PER 15 MG: Performed by: OBSTETRICS & GYNECOLOGY

## 2024-08-20 PROCEDURE — 86901 BLOOD TYPING SEROLOGIC RH(D): CPT | Performed by: OBSTETRICS & GYNECOLOGY

## 2024-08-20 PROCEDURE — 25810000003 LACTATED RINGERS PER 1000 ML: Performed by: OBSTETRICS & GYNECOLOGY

## 2024-08-20 PROCEDURE — 86850 RBC ANTIBODY SCREEN: CPT | Performed by: OBSTETRICS & GYNECOLOGY

## 2024-08-20 PROCEDURE — 59025 FETAL NON-STRESS TEST: CPT

## 2024-08-20 PROCEDURE — 88302 TISSUE EXAM BY PATHOLOGIST: CPT | Performed by: OBSTETRICS & GYNECOLOGY

## 2024-08-20 DEVICE — SEAL HEMO SURG ARISTA/AH ABS/PWDR 3GM: Type: IMPLANTABLE DEVICE | Site: UTERUS | Status: FUNCTIONAL

## 2024-08-20 RX ORDER — DIPHENHYDRAMINE HCL 25 MG
25 CAPSULE ORAL EVERY 4 HOURS PRN
Status: CANCELLED | OUTPATIENT
Start: 2024-08-20

## 2024-08-20 RX ORDER — METHYLERGONOVINE MALEATE 0.2 MG/ML
INJECTION INTRAVENOUS AS NEEDED
Status: DISCONTINUED | OUTPATIENT
Start: 2024-08-20 | End: 2024-08-20 | Stop reason: SURG

## 2024-08-20 RX ORDER — PRENATAL VIT/IRON FUM/FOLIC AC 27MG-0.8MG
1 TABLET ORAL DAILY
Status: DISCONTINUED | OUTPATIENT
Start: 2024-08-20 | End: 2024-08-23 | Stop reason: HOSPADM

## 2024-08-20 RX ORDER — SODIUM CHLORIDE 0.9 % (FLUSH) 0.9 %
10 SYRINGE (ML) INJECTION AS NEEDED
Status: DISCONTINUED | OUTPATIENT
Start: 2024-08-20 | End: 2024-08-20 | Stop reason: HOSPADM

## 2024-08-20 RX ORDER — ACETAMINOPHEN 500 MG
1000 TABLET ORAL ONCE
Status: COMPLETED | OUTPATIENT
Start: 2024-08-20 | End: 2024-08-20

## 2024-08-20 RX ORDER — MIDAZOLAM HYDROCHLORIDE 1 MG/ML
INJECTION INTRAMUSCULAR; INTRAVENOUS AS NEEDED
Status: DISCONTINUED | OUTPATIENT
Start: 2024-08-20 | End: 2024-08-20 | Stop reason: SURG

## 2024-08-20 RX ORDER — CARBOPROST TROMETHAMINE 250 UG/ML
250 INJECTION, SOLUTION INTRAMUSCULAR AS NEEDED
Status: DISCONTINUED | OUTPATIENT
Start: 2024-08-20 | End: 2024-08-23 | Stop reason: HOSPADM

## 2024-08-20 RX ORDER — BUPIVACAINE HYDROCHLORIDE 7.5 MG/ML
INJECTION, SOLUTION INTRASPINAL AS NEEDED
Status: DISCONTINUED | OUTPATIENT
Start: 2024-08-20 | End: 2024-08-20 | Stop reason: SURG

## 2024-08-20 RX ORDER — MISOPROSTOL 200 UG/1
800 TABLET ORAL AS NEEDED
Status: DISCONTINUED | OUTPATIENT
Start: 2024-08-20 | End: 2024-08-20 | Stop reason: HOSPADM

## 2024-08-20 RX ORDER — OXYCODONE HYDROCHLORIDE 10 MG/1
10 TABLET ORAL EVERY 4 HOURS PRN
Status: DISCONTINUED | OUTPATIENT
Start: 2024-08-20 | End: 2024-08-23 | Stop reason: HOSPADM

## 2024-08-20 RX ORDER — OXYTOCIN/0.9 % SODIUM CHLORIDE 30/500 ML
85 PLASTIC BAG, INJECTION (ML) INTRAVENOUS ONCE
Status: DISCONTINUED | OUTPATIENT
Start: 2024-08-20 | End: 2024-08-20 | Stop reason: HOSPADM

## 2024-08-20 RX ORDER — MORPHINE SULFATE 0.5 MG/ML
INJECTION, SOLUTION EPIDURAL; INTRATHECAL; INTRAVENOUS AS NEEDED
Status: DISCONTINUED | OUTPATIENT
Start: 2024-08-20 | End: 2024-08-20 | Stop reason: SURG

## 2024-08-20 RX ORDER — MISOPROSTOL 200 UG/1
600 TABLET ORAL AS NEEDED
Status: DISCONTINUED | OUTPATIENT
Start: 2024-08-20 | End: 2024-08-23 | Stop reason: HOSPADM

## 2024-08-20 RX ORDER — KETOROLAC TROMETHAMINE 30 MG/ML
30 INJECTION, SOLUTION INTRAMUSCULAR; INTRAVENOUS ONCE
Status: COMPLETED | OUTPATIENT
Start: 2024-08-20 | End: 2024-08-20

## 2024-08-20 RX ORDER — CARBOPROST TROMETHAMINE 250 UG/ML
250 INJECTION, SOLUTION INTRAMUSCULAR AS NEEDED
Status: DISCONTINUED | OUTPATIENT
Start: 2024-08-20 | End: 2024-08-20 | Stop reason: HOSPADM

## 2024-08-20 RX ORDER — METOCLOPRAMIDE HYDROCHLORIDE 5 MG/ML
INJECTION INTRAMUSCULAR; INTRAVENOUS AS NEEDED
Status: DISCONTINUED | OUTPATIENT
Start: 2024-08-20 | End: 2024-08-20 | Stop reason: SURG

## 2024-08-20 RX ORDER — ACETAMINOPHEN 500 MG
1000 TABLET ORAL EVERY 6 HOURS
Status: COMPLETED | OUTPATIENT
Start: 2024-08-20 | End: 2024-08-21

## 2024-08-20 RX ORDER — DIPHENHYDRAMINE HYDROCHLORIDE 50 MG/ML
25 INJECTION INTRAMUSCULAR; INTRAVENOUS EVERY 4 HOURS PRN
Status: CANCELLED | OUTPATIENT
Start: 2024-08-20

## 2024-08-20 RX ORDER — SCOLOPAMINE TRANSDERMAL SYSTEM 1 MG/1
1 PATCH, EXTENDED RELEASE TRANSDERMAL
Status: DISCONTINUED | OUTPATIENT
Start: 2024-08-20 | End: 2024-08-20

## 2024-08-20 RX ORDER — ACETAMINOPHEN 325 MG/1
650 TABLET ORAL EVERY 6 HOURS
Status: DISCONTINUED | OUTPATIENT
Start: 2024-08-21 | End: 2024-08-23 | Stop reason: HOSPADM

## 2024-08-20 RX ORDER — CALCIUM CARBONATE 500 MG/1
1 TABLET, CHEWABLE ORAL EVERY 4 HOURS PRN
Status: DISCONTINUED | OUTPATIENT
Start: 2024-08-20 | End: 2024-08-23 | Stop reason: HOSPADM

## 2024-08-20 RX ORDER — OXYTOCIN/0.9 % SODIUM CHLORIDE 30/500 ML
PLASTIC BAG, INJECTION (ML) INTRAVENOUS AS NEEDED
Status: DISCONTINUED | OUTPATIENT
Start: 2024-08-20 | End: 2024-08-20 | Stop reason: SURG

## 2024-08-20 RX ORDER — DIPHENHYDRAMINE HYDROCHLORIDE 50 MG/ML
25 INJECTION INTRAMUSCULAR; INTRAVENOUS ONCE AS NEEDED
Status: CANCELLED | OUTPATIENT
Start: 2024-08-20

## 2024-08-20 RX ORDER — DOCUSATE SODIUM 100 MG/1
100 CAPSULE, LIQUID FILLED ORAL 2 TIMES DAILY PRN
Status: DISCONTINUED | OUTPATIENT
Start: 2024-08-20 | End: 2024-08-23 | Stop reason: HOSPADM

## 2024-08-20 RX ORDER — SODIUM CHLORIDE, SODIUM LACTATE, POTASSIUM CHLORIDE, CALCIUM CHLORIDE 600; 310; 30; 20 MG/100ML; MG/100ML; MG/100ML; MG/100ML
125 INJECTION, SOLUTION INTRAVENOUS CONTINUOUS
Status: DISCONTINUED | OUTPATIENT
Start: 2024-08-20 | End: 2024-08-20

## 2024-08-20 RX ORDER — LIDOCAINE HYDROCHLORIDE 10 MG/ML
0.5 INJECTION, SOLUTION EPIDURAL; INFILTRATION; INTRACAUDAL; PERINEURAL ONCE AS NEEDED
Status: DISCONTINUED | OUTPATIENT
Start: 2024-08-20 | End: 2024-08-20 | Stop reason: HOSPADM

## 2024-08-20 RX ORDER — OXYTOCIN/0.9 % SODIUM CHLORIDE 30/500 ML
125 PLASTIC BAG, INJECTION (ML) INTRAVENOUS ONCE AS NEEDED
Status: DISCONTINUED | OUTPATIENT
Start: 2024-08-20 | End: 2024-08-23 | Stop reason: HOSPADM

## 2024-08-20 RX ORDER — SODIUM CHLORIDE 9 MG/ML
40 INJECTION, SOLUTION INTRAVENOUS AS NEEDED
Status: DISCONTINUED | OUTPATIENT
Start: 2024-08-20 | End: 2024-08-20 | Stop reason: HOSPADM

## 2024-08-20 RX ORDER — OXYCODONE HYDROCHLORIDE 5 MG/1
5 TABLET ORAL EVERY 4 HOURS PRN
Status: DISCONTINUED | OUTPATIENT
Start: 2024-08-20 | End: 2024-08-23 | Stop reason: HOSPADM

## 2024-08-20 RX ORDER — METHYLERGONOVINE MALEATE 0.2 MG/ML
200 INJECTION INTRAVENOUS ONCE AS NEEDED
Status: DISCONTINUED | OUTPATIENT
Start: 2024-08-20 | End: 2024-08-20 | Stop reason: HOSPADM

## 2024-08-20 RX ORDER — FAMOTIDINE 10 MG/ML
INJECTION, SOLUTION INTRAVENOUS AS NEEDED
Status: DISCONTINUED | OUTPATIENT
Start: 2024-08-20 | End: 2024-08-20 | Stop reason: SURG

## 2024-08-20 RX ORDER — HYDROCORTISONE 25 MG/G
1 CREAM TOPICAL AS NEEDED
Status: DISCONTINUED | OUTPATIENT
Start: 2024-08-20 | End: 2024-08-23 | Stop reason: HOSPADM

## 2024-08-20 RX ORDER — ALUMINA, MAGNESIA, AND SIMETHICONE 2400; 2400; 240 MG/30ML; MG/30ML; MG/30ML
15 SUSPENSION ORAL EVERY 4 HOURS PRN
Status: DISCONTINUED | OUTPATIENT
Start: 2024-08-20 | End: 2024-08-23 | Stop reason: HOSPADM

## 2024-08-20 RX ORDER — KETOROLAC TROMETHAMINE 15 MG/ML
15 INJECTION, SOLUTION INTRAMUSCULAR; INTRAVENOUS EVERY 6 HOURS
Status: COMPLETED | OUTPATIENT
Start: 2024-08-20 | End: 2024-08-21

## 2024-08-20 RX ORDER — IBUPROFEN 600 MG/1
600 TABLET, FILM COATED ORAL EVERY 6 HOURS
Status: DISCONTINUED | OUTPATIENT
Start: 2024-08-21 | End: 2024-08-23 | Stop reason: HOSPADM

## 2024-08-20 RX ORDER — SODIUM CHLORIDE 0.9 % (FLUSH) 0.9 %
10 SYRINGE (ML) INJECTION EVERY 12 HOURS SCHEDULED
Status: DISCONTINUED | OUTPATIENT
Start: 2024-08-20 | End: 2024-08-20 | Stop reason: HOSPADM

## 2024-08-20 RX ORDER — METHYLERGONOVINE MALEATE 0.2 MG/ML
200 INJECTION INTRAVENOUS AS NEEDED
Status: DISCONTINUED | OUTPATIENT
Start: 2024-08-20 | End: 2024-08-23 | Stop reason: HOSPADM

## 2024-08-20 RX ORDER — TRANEXAMIC ACID 10 MG/ML
INJECTION, SOLUTION INTRAVENOUS AS NEEDED
Status: DISCONTINUED | OUTPATIENT
Start: 2024-08-20 | End: 2024-08-20 | Stop reason: SURG

## 2024-08-20 RX ORDER — PROMETHAZINE HYDROCHLORIDE 12.5 MG/1
12.5 SUPPOSITORY RECTAL EVERY 6 HOURS PRN
Status: DISCONTINUED | OUTPATIENT
Start: 2024-08-20 | End: 2024-08-23 | Stop reason: HOSPADM

## 2024-08-20 RX ORDER — CITRIC ACID/SODIUM CITRATE 334-500MG
30 SOLUTION, ORAL ORAL ONCE
Status: COMPLETED | OUTPATIENT
Start: 2024-08-20 | End: 2024-08-20

## 2024-08-20 RX ORDER — OXYTOCIN/0.9 % SODIUM CHLORIDE 30/500 ML
650 PLASTIC BAG, INJECTION (ML) INTRAVENOUS ONCE
Status: DISCONTINUED | OUTPATIENT
Start: 2024-08-20 | End: 2024-08-20 | Stop reason: HOSPADM

## 2024-08-20 RX ORDER — PROMETHAZINE HYDROCHLORIDE 25 MG/1
25 TABLET ORAL EVERY 6 HOURS PRN
Status: DISCONTINUED | OUTPATIENT
Start: 2024-08-20 | End: 2024-08-23 | Stop reason: HOSPADM

## 2024-08-20 RX ORDER — ONDANSETRON 2 MG/ML
INJECTION INTRAMUSCULAR; INTRAVENOUS AS NEEDED
Status: DISCONTINUED | OUTPATIENT
Start: 2024-08-20 | End: 2024-08-20 | Stop reason: SURG

## 2024-08-20 RX ORDER — SIMETHICONE 80 MG
80 TABLET,CHEWABLE ORAL 4 TIMES DAILY PRN
Status: DISCONTINUED | OUTPATIENT
Start: 2024-08-20 | End: 2024-08-23 | Stop reason: HOSPADM

## 2024-08-20 RX ORDER — FENTANYL CITRATE 50 UG/ML
INJECTION, SOLUTION INTRAMUSCULAR; INTRAVENOUS AS NEEDED
Status: DISCONTINUED | OUTPATIENT
Start: 2024-08-20 | End: 2024-08-20 | Stop reason: SURG

## 2024-08-20 RX ADMIN — MIDAZOLAM HYDROCHLORIDE 1 MG: 1 INJECTION, SOLUTION INTRAMUSCULAR; INTRAVENOUS at 10:21

## 2024-08-20 RX ADMIN — MIDAZOLAM HYDROCHLORIDE 1 MG: 1 INJECTION, SOLUTION INTRAMUSCULAR; INTRAVENOUS at 11:04

## 2024-08-20 RX ADMIN — FAMOTIDINE 20 MG: 10 INJECTION, SOLUTION INTRAVENOUS at 10:21

## 2024-08-20 RX ADMIN — METHYLERGONOVINE MALEATE 200 MCG: 0.2 INJECTION, SOLUTION INTRAMUSCULAR; INTRAVENOUS at 10:45

## 2024-08-20 RX ADMIN — SODIUM CHLORIDE, POTASSIUM CHLORIDE, SODIUM LACTATE AND CALCIUM CHLORIDE 1000 ML/HR: 600; 310; 30; 20 INJECTION, SOLUTION INTRAVENOUS at 10:15

## 2024-08-20 RX ADMIN — KETOROLAC TROMETHAMINE 15 MG: 15 INJECTION, SOLUTION INTRAMUSCULAR; INTRAVENOUS at 18:28

## 2024-08-20 RX ADMIN — SODIUM CHLORIDE, POTASSIUM CHLORIDE, SODIUM LACTATE AND CALCIUM CHLORIDE 1000 ML: 600; 310; 30; 20 INJECTION, SOLUTION INTRAVENOUS at 08:35

## 2024-08-20 RX ADMIN — BUPIVACAINE HYDROCHLORIDE IN DEXTROSE 1.6 ML: 7.5 INJECTION, SOLUTION SUBARACHNOID at 10:25

## 2024-08-20 RX ADMIN — TRANEXAMIC ACID 1000 MG: 10 INJECTION, SOLUTION INTRAVENOUS at 10:45

## 2024-08-20 RX ADMIN — MORPHINE SULFATE 150 MCG: 0.5 INJECTION, SOLUTION EPIDURAL; INTRATHECAL; INTRAVENOUS at 10:25

## 2024-08-20 RX ADMIN — ACETAMINOPHEN 1000 MG: 500 TABLET ORAL at 09:30

## 2024-08-20 RX ADMIN — METOCLOPRAMIDE 10 MG: 5 INJECTION, SOLUTION INTRAMUSCULAR; INTRAVENOUS at 10:33

## 2024-08-20 RX ADMIN — FENTANYL CITRATE 20 MCG: 50 INJECTION, SOLUTION INTRAMUSCULAR; INTRAVENOUS at 10:25

## 2024-08-20 RX ADMIN — SODIUM CITRATE AND CITRIC ACID MONOHYDRATE 30 ML: 500; 334 SOLUTION ORAL at 10:18

## 2024-08-20 RX ADMIN — SODIUM CHLORIDE 2 G: 900 INJECTION INTRAVENOUS at 10:17

## 2024-08-20 RX ADMIN — ACETAMINOPHEN 1000 MG: 500 TABLET, FILM COATED ORAL at 22:22

## 2024-08-20 RX ADMIN — ACETAMINOPHEN 1000 MG: 500 TABLET, FILM COATED ORAL at 15:25

## 2024-08-20 RX ADMIN — Medication 500 ML: at 11:13

## 2024-08-20 RX ADMIN — MIDAZOLAM HYDROCHLORIDE 1 MG: 1 INJECTION, SOLUTION INTRAMUSCULAR; INTRAVENOUS at 10:51

## 2024-08-20 RX ADMIN — ONDANSETRON 4 MG: 2 INJECTION INTRAMUSCULAR; INTRAVENOUS at 10:21

## 2024-08-20 RX ADMIN — SCOPOLAMINE 1 PATCH: 1.5 PATCH, EXTENDED RELEASE TRANSDERMAL at 08:51

## 2024-08-20 RX ADMIN — KETOROLAC TROMETHAMINE 30 MG: 30 INJECTION, SOLUTION INTRAMUSCULAR; INTRAVENOUS at 12:42

## 2024-08-20 RX ADMIN — SODIUM CHLORIDE, POTASSIUM CHLORIDE, SODIUM LACTATE AND CALCIUM CHLORIDE: 600; 310; 30; 20 INJECTION, SOLUTION INTRAVENOUS at 10:42

## 2024-08-20 NOTE — PLAN OF CARE
Problem: Bleeding ( Delivery)  Goal: Bleeding is Controlled  Outcome: Met     Problem: Infection ( Delivery)  Goal: Absence of Infection Signs and Symptoms  Outcome: Met     Problem: Respiratory Compromise ( Delivery)  Goal: Effective Oxygenation and Ventilation  Outcome: Met     Problem: Adult Inpatient Plan of Care  Goal: Absence of Hospital-Acquired Illness or Injury  Intervention: Prevent and Manage VTE (Venous Thromboembolism) Risk  Recent Flowsheet Documentation  Taken 2024 1145 by Zoya Edward, RN  VTE Prevention/Management:   bilateral   sequential compression devices on  Taken 2024 1126 by Zoya Edward, RN  VTE Prevention/Management:   bilateral   sequential compression devices on   Goal Outcome Evaluation:

## 2024-08-20 NOTE — ANESTHESIA PROCEDURE NOTES
Spinal Block      Patient reassessed immediately prior to procedure    Patient location during procedure: OR  Indication:at surgeon's request  Performed By  CRNA/DAMIAN: Christa Honeycutt CRNA  Preanesthetic Checklist  Completed: patient identified, IV checked, site marked, risks and benefits discussed, surgical consent, monitors and equipment checked, pre-op evaluation and timeout performed  Spinal Block Prep:  Patient Position:sitting  Sterile Tech:cap, gloves, mask and sterile barriers  Prep:Betadine  Patient Monitoring:blood pressure monitoring, continuous pulse oximetry and EKG    Spinal Block Procedure  Approach:midline  Guidance:palpation technique  Location:L3-L4  Needle Type:Jase  Needle Gauge:25 G  Placement of Spinal needle event:cerebrospinal fluid aspirated  Paresthesia: no  Fluid Appearance:clear     Post Assessment  Patient Tolerance:patient tolerated the procedure well with no apparent complications  Complications no             Silver Nitrate Text: The wound bed was treated with silver nitrate after the biopsy was performed.

## 2024-08-20 NOTE — OP NOTE
"Operative Note    Patient name: Ely De La Garza  YOB: 1985   MRN: [id number]  Admission Date: 2024  Referring Provider: Itzel Damon MD    ID: 38 y.o.  at 36w1d    Pre-Operative Dx:   Intrauterine pregnancy at 36w1d weeks   Placenta previa  Previous  section - not a  candidate  Desires sterilization      Postoperative dx:    Intrauterine pregnancy at 36w1d weeks   Placenta previa  Previous  section - declines   Desires sterilization            Procedure(s): repeatlow transverse  delivery, bilateral salpingectomy.     Surgeons: Surgeons and Role:     * Itzel Damon MD - Primary    Staff:  Surgeon(s):  Itzel Damon MD                Anesthesia: Spinal    Estimated Blood Loss:  800  mL        Preoperative antibiotic: Ancef (cefazolin) 2 grams    Blood products:   Blood Administration Record (From admission, onward)      None            Pathology:   Order Name Source Comment Collection Info Order Time   PREPARE RBC Other   2024  8:46 AM     When to Transfuse?   Hold          Indication for Transfusion   Surgery          Surgery Date   2024          Release to patient   Routine Release        TISSUE PATHOLOGY EXAM Fallopian Tubes, Bilateral Right tube complete Collected By: Itzel Damon MD 2024 11:10 AM     Release to patient   Routine Release            Drains: Morelos catheter to gravity    Complications: None    Condition: Stable to recovery room    Infant:                Gender: male  infant    Weight: 3005 g (6 lb 10 oz)     Apgars: 8  @ 1 minute /     9  @ 5 minutes    Cord gases: Venous:  No results found for: \"PHCVEN\", \"BECVEN\"      Arterial:  No results found for: \"PHCART\", \"BECART\"          Operative Summary:   After obtaining informed consent the patient was taken to the operating room where adequate anesthesia was obtained.  Morelos catheter was placed in the bladder preoperatively.  IV antibiotics were given preoperatively.  "      The abdomen was prepped and draped in the usual sterile fashion for  delivery.  After confirming adequate anesthesia a Pfannenstiel skin incision was made with the scalpel and carried through to the underlying layer of fascia.  The fascia was incised in the midline and the incision extended laterally with the Giron scissors and with blunt dissection.       The upper aspect of the fascia was grasped with 2 Kocher clamps, elevated, and dissected off the underlying rectus muscles bluntly and with the Giron scissors.  The Kocher clamps were removed and applied to the inferior aspect of the fascia.  The fascia was dissected off of the rectus muscles in the same fashion.  The peritoneum was entered bluntly.  The incision was stretched and the bladder blade and Daniel retractor inserted for visualization of the uterus. A bladder flap was made and bladder blade replaced.        The uterus was incised with the scalpel in a low transverse fashion.  The uterine incision was entered digitally and the incision extended bluntly in a craniocaudal fashion.  Retractors were removed and membranes were ruptured.  The infant was delivered atraumatically from vtx presentation. Delayed cord clamping performed for 30 sec, the cord then  clamped and cut and the nose and mouth bulb suctioned.  The infant was handed off to waiting pediatric staff.       Cord blood gases were not collected.  Cord blood was collected.  The placenta was removed using cord traction and uterine massage.  The uterus was exteriorized and cleared of all clots and debris.  The uterine incision was repaired with #1 chromic in a running locked fashion. A double layer technique was used.  Additional hemostatic measures required: none.    The incision was inspected and excellent hemostasis was noted.  The tubes and ovaries were noted to be normal.    A bilateral salpintectomy was performed using the enseal device with good hemostasis. The left tube has  some significant scarring, and had to be removed in pieces.  The uterus was returned to the abdomen.  The gutters were cleared of all clots and debris.  Irrigation was used.  The uterine incision and salpingectomy sites were again inspected and found to be hemostatic.       The peritoneum was reapproximated with 2-0 Vicryl in a running fashion.  The fascia was closed with 0 Vicryl in a running fashion.  The subcutaneous space was reapproximated using 3-0 plain gut in interrupted stiches.      The skin was closed using staples, and dressing placed. All sharp, instrument, and sponge counts were correct. The patient was transferred to the recovery room in stable condition.    Surgical Assist:          Itzel Damon MD  8/20/2024

## 2024-08-20 NOTE — ANESTHESIA POSTPROCEDURE EVALUATION
Patient: Ely De La Garza    Procedure Summary       Date: 24 Room / Location: [facility] LABOR DELIVERY   [facility] LABOR DELIVERY    Anesthesia Start: 102 Anesthesia Stop:     Procedure:  SECTION REPEAT WITH SALPINGECTOMY (Abdomen) Diagnosis:     Surgeons: Itzel Damon MD Provider: Brian Luciano DO    Anesthesia Type: ITN, spinal ASA Status: 2            Anesthesia Type: ITN, spinal    Vitals  Vitals Value Taken Time   /62    Temp 97.6    Pulse 77 24 1127   Resp 16    SpO2 99 % 24 1127   Vitals shown include unfiled device data.        Post Anesthesia Care and Evaluation    Patient location during evaluation: bedside  Patient participation: complete - patient participated  Level of consciousness: awake and alert  Pain management: adequate    Airway patency: patent  Anesthetic complications: No anesthetic complications    Cardiovascular status: acceptable  Respiratory status: acceptable  Hydration status: acceptable

## 2024-08-20 NOTE — ANESTHESIA PREPROCEDURE EVALUATION
Anesthesia Evaluation     Patient summary reviewed and Nursing notes reviewed   history of anesthetic complications:  PONV  NPO Solid Status: > 8 hours  NPO Liquid Status: > 2 hours           Airway   Mallampati: II  TM distance: >3 FB  Neck ROM: full  Dental      Pulmonary - negative pulmonary ROS   Cardiovascular - negative cardio ROS        Neuro/Psych  (+) headaches, psychiatric history Anxiety and Depression  GI/Hepatic/Renal/Endo    (+) GERD, diabetes mellitus gestational    Musculoskeletal (-) negative ROS    Abdominal    Substance History - negative use     OB/GYN    (+) Pregnant        Other - negative ROS                   Anesthesia Plan    ASA 2     ITN and spinal       Anesthetic plan, risks, benefits, and alternatives have been provided, discussed and informed consent has been obtained with: patient.    Use of blood products discussed with patient .    Plan discussed with attending.    CODE STATUS:

## 2024-08-20 NOTE — LACTATION NOTE
08/20/24 1515   Maternal Information   Date of Referral 08/20/24   Person Making Referral lactation consultant   Maternal Reason for Referral breastfeeding unsuccessful in past;separation from infant   Infant Reason for Referral separation from mother  (currently in NICU OBS)   Maternal Assessment   Left Nipple Symptoms nontender   Right Nipple Symptoms nontender   Maternal Infant Feeding   Maternal Emotional State receptive   Milk Expression/Equipment   Breast Pump Type double electric, hospital grade   Breast Pump Flange Size 21 mm;24 mm  (encouraged to use tighest comfortable fit)   Breast Pumping   Breast Pumping Interventions frequent pumping encouraged   Lactation Referrals   Lactation Referrals outpatient lactation program     Courtesy visit for newly postpartum couplet. Infant is currently in NICU OBS. RN already set up hospital pump, and MOB has used. Reviewed typical pump volumes at this time. MOB reports first child is 15 now, but was 29w delivery and spent 6 weeks in NICU. She pumped for those 6 weeks, but was unable to maintain supply after discharge. NICU and regular educational packets provided and reviewed with patient. Patient would like latch check and follow up lactation, especially if infant comes down from OBS. Encouraged to call as needs arise.

## 2024-08-21 LAB
BASOPHILS # BLD AUTO: 0.04 10*3/MM3 (ref 0–0.2)
BASOPHILS NFR BLD AUTO: 0.4 % (ref 0–1.5)
DEPRECATED RDW RBC AUTO: 37.9 FL (ref 37–54)
EOSINOPHIL # BLD AUTO: 0.08 10*3/MM3 (ref 0–0.4)
EOSINOPHIL NFR BLD AUTO: 0.9 % (ref 0.3–6.2)
ERYTHROCYTE [DISTWIDTH] IN BLOOD BY AUTOMATED COUNT: 12.5 % (ref 12.3–15.4)
HCT VFR BLD AUTO: 25.1 % (ref 34–46.6)
HGB BLD-MCNC: 8.4 G/DL (ref 12–15.9)
IMM GRANULOCYTES # BLD AUTO: 0.09 10*3/MM3 (ref 0–0.05)
IMM GRANULOCYTES NFR BLD AUTO: 1 % (ref 0–0.5)
LYMPHOCYTES # BLD AUTO: 0.89 10*3/MM3 (ref 0.7–3.1)
LYMPHOCYTES NFR BLD AUTO: 9.8 % (ref 19.6–45.3)
MCH RBC QN AUTO: 28.3 PG (ref 26.6–33)
MCHC RBC AUTO-ENTMCNC: 33.5 G/DL (ref 31.5–35.7)
MCV RBC AUTO: 84.5 FL (ref 79–97)
MONOCYTES # BLD AUTO: 0.56 10*3/MM3 (ref 0.1–0.9)
MONOCYTES NFR BLD AUTO: 6.2 % (ref 5–12)
NEUTROPHILS NFR BLD AUTO: 7.39 10*3/MM3 (ref 1.7–7)
NEUTROPHILS NFR BLD AUTO: 81.7 % (ref 42.7–76)
NRBC BLD AUTO-RTO: 0 /100 WBC (ref 0–0.2)
PLATELET # BLD AUTO: 137 10*3/MM3 (ref 140–450)
PMV BLD AUTO: 10.3 FL (ref 6–12)
RBC # BLD AUTO: 2.97 10*6/MM3 (ref 3.77–5.28)
WBC NRBC COR # BLD AUTO: 9.05 10*3/MM3 (ref 3.4–10.8)

## 2024-08-21 PROCEDURE — 85025 COMPLETE CBC W/AUTO DIFF WBC: CPT | Performed by: OBSTETRICS & GYNECOLOGY

## 2024-08-21 PROCEDURE — 25010000002 KETOROLAC TROMETHAMINE PER 15 MG: Performed by: OBSTETRICS & GYNECOLOGY

## 2024-08-21 PROCEDURE — 0503F POSTPARTUM CARE VISIT: CPT | Performed by: NURSE PRACTITIONER

## 2024-08-21 RX ORDER — NALOXONE HCL 0.4 MG/ML
0.4 VIAL (ML) INJECTION
Status: ACTIVE | OUTPATIENT
Start: 2024-08-21 | End: 2024-08-22

## 2024-08-21 RX ORDER — ONDANSETRON 2 MG/ML
4 INJECTION INTRAMUSCULAR; INTRAVENOUS ONCE AS NEEDED
Status: ACTIVE | OUTPATIENT
Start: 2024-08-21 | End: 2024-08-22

## 2024-08-21 RX ADMIN — DOCUSATE SODIUM 100 MG: 100 CAPSULE, LIQUID FILLED ORAL at 15:57

## 2024-08-21 RX ADMIN — ACETAMINOPHEN 1000 MG: 500 TABLET, FILM COATED ORAL at 10:49

## 2024-08-21 RX ADMIN — KETOROLAC TROMETHAMINE 15 MG: 15 INJECTION, SOLUTION INTRAMUSCULAR; INTRAVENOUS at 01:02

## 2024-08-21 RX ADMIN — OXYCODONE HYDROCHLORIDE 10 MG: 10 TABLET ORAL at 10:53

## 2024-08-21 RX ADMIN — SIMETHICONE 80 MG: 80 TABLET, CHEWABLE ORAL at 22:55

## 2024-08-21 RX ADMIN — OXYCODONE HYDROCHLORIDE 10 MG: 10 TABLET ORAL at 05:14

## 2024-08-21 RX ADMIN — Medication 1 APPLICATION: at 15:58

## 2024-08-21 RX ADMIN — ACETAMINOPHEN 650 MG: 325 TABLET, FILM COATED ORAL at 21:46

## 2024-08-21 RX ADMIN — OXYCODONE HYDROCHLORIDE 5 MG: 5 TABLET ORAL at 21:46

## 2024-08-21 RX ADMIN — IBUPROFEN 600 MG: 600 TABLET ORAL at 18:49

## 2024-08-21 RX ADMIN — KETOROLAC TROMETHAMINE 15 MG: 15 INJECTION, SOLUTION INTRAMUSCULAR; INTRAVENOUS at 13:28

## 2024-08-21 RX ADMIN — OXYCODONE HYDROCHLORIDE 10 MG: 10 TABLET ORAL at 15:58

## 2024-08-21 RX ADMIN — PRENATAL VITAMINS-IRON FUMARATE 27 MG IRON-FOLIC ACID 0.8 MG TABLET 1 TABLET: at 08:41

## 2024-08-21 RX ADMIN — SIMETHICONE 80 MG: 80 TABLET, CHEWABLE ORAL at 15:58

## 2024-08-21 RX ADMIN — ACETAMINOPHEN 650 MG: 325 TABLET, FILM COATED ORAL at 15:58

## 2024-08-21 RX ADMIN — KETOROLAC TROMETHAMINE 15 MG: 15 INJECTION, SOLUTION INTRAMUSCULAR; INTRAVENOUS at 06:36

## 2024-08-21 RX ADMIN — ACETAMINOPHEN 1000 MG: 500 TABLET, FILM COATED ORAL at 05:08

## 2024-08-21 NOTE — PROGRESS NOTES
Postpartum Progress Note    Patient name: Ely De La Garza  YOB: 1985   MRN: [id number]  Referring Provider: Itzel Damon MD  Admission Date: 2024  Date of Service: 2024    ID: 38 y.o.     Diagnosis:   S/p  delivery 1 Day Post-Op     Placenta previa       Subjective:      No complaints.  Moderate lochia.  Ambulating, voiding, tolerating diet.  Pain well controlled.  The patient is currently breastfeeding.   This baby is a male    Objective:      Vital signs:  Vital Signs Range for the last 24 hours  Temperature: Temp:  [97.6 °F (36.4 °C)-98.6 °F (37 °C)] 98.6 °F (37 °C)   Temp Source: Temp src: Oral   BP: BP: (105-135)/(56-90) 111/56   Pulse: Heart Rate:  [52-77] 77   Respirations: Resp:  [16-20] 18   Weight: 94.3 kg (208 lb)     General: Alert & oriented x4, in no apparent distress  Abdomen: soft, nontender  Uterus: firm, nontender  Incision: clean, dry, intact   Extremities: nontender; no edema      Labs:  Lab Results   Component Value Date    WBC 6.08 2024    HGB 11.1 (L) 2024    HCT 33.4 (L) 2024    MCV 83.3 2024     2024     Results from last 7 days   Lab Units 24  0837   ABO TYPING  A   RH TYPING  Positive     External Prenatal Results       Pregnancy Outside Results - Transcribed From Office Records - See Scanned Records For Details       Test Value Date Time    ABO  A  24 0837    Rh  Positive  24 0837    Antibody Screen  Negative  24 0837       Negative  24 0157       Negative  24 1013       Negative  24 1021    Varicella IgG       Rubella  <0.90 index 24 1021    Hgb  11.1 g/dL 24 0837       11.0 g/dL 24 1149       10.6 g/dL 24 0101       10.9 g/dL 24 1013       12.8 g/dL 24 1138       13.3 g/dL 24 1021    Hct  33.4 % 24 0837       34.8 % 24 1149       30.3 % 24 0101       32.7 % 24 1013       37.7 % 24 1138        40.0 % 24 1021    HgB A1c   5.1 % 24 1021    1h GTT  207 mg/dL 24 1013    3h GTT Fasting       3h GTT 1 hour       3h GTT 2 hour       3h GTT 3 hour        Gonorrhea (discrete)  Negative  24 1030    Chlamydia (discrete)  Negative  24 1030    RPR  Non Reactive  24 1013       Non Reactive  24 1021    Syphils cascade: TP-Ab (FTA)  Non-Reactive  24 0837    TP-Ab       TP-Ab (EIA)       TPPA       HBsAg  Negative  24 1021    Herpes Simplex Virus PCR       Herpes Simplex VIrus Culture       HIV  Non Reactive  24 1021    Hep C RNA Quant PCR       Hep C Antibody  Non Reactive  24 1021    AFP       NIPT       Cystic Fibroisis        Group B Strep  No Group B Streptococcus isolated  24 1804    GBS Susceptibility to Clindamycin       GBS Susceptibility to Erythromycin       Fetal Fibronectin       Genetic Testing, Maternal Blood                 Drug Screening       Test Value Date Time    Urine Drug Screen       Amphetamine Screen  Negative ng/mL 24 1030    Barbiturate Screen  Negative ng/mL 24 1030    Benzodiazepine Screen  Negative ng/mL 24 1030    Methadone Screen  Negative ng/mL 24 1030    Phencyclidine Screen  Negative ng/mL 24 1030    Opiates Screen       THC Screen       Cocaine Screen       Propoxyphene Screen  Negative ng/mL 24 1030    Buprenorphine Screen       Methamphetamine Screen       Oxycodone Screen       Tricyclic Antidepressants Screen                 Legend    ^: Historical                            Assessment/Plan:      1 Day Post-Op s/p Procedure(s):   SECTION REPEAT WITH SALPINGECTOMY  1. S/p  delivery: Continue postoperative care.  Doing well.  2. Infant feeding: Supportive care.  The patient is currently breastfeeding. Male infant desires circ.  3. Rub NI. Offer MMR prior to DC.

## 2024-08-21 NOTE — ANESTHESIA POSTPROCEDURE EVALUATION
Patient: Ely De La Garza    Procedure Summary       Date: 24 Room / Location: [facility] LABOR DELIVERY   [facility] LABOR DELIVERY    Anesthesia Start: 1021 Anesthesia Stop: 1128    Procedure:  SECTION REPEAT WITH SALPINGECTOMY (Abdomen) Diagnosis:     Surgeons: Itzel Damon MD Provider: Brian Luciano DO    Anesthesia Type: ITN, spinal ASA Status: 2            Anesthesia Type: ITN, spinal    Vitals  Vitals Value Taken Time   /68 24 1217   Temp 98.6 °F (37 °C) 24 1217   Pulse 92 24 1217   Resp 18 24 1217   SpO2 100 % 24 1248   Vitals shown include unfiled device data.        Post Anesthesia Care and Evaluation    Patient location during evaluation: bedside  Patient participation: complete - patient participated  Level of consciousness: awake and alert  Pain management: adequate    Airway patency: patent  Anesthetic complications: No anesthetic complications    Cardiovascular status: acceptable  Respiratory status: acceptable  Hydration status: acceptable  Post Neuraxial Block status: Motor and sensory function returned to baseline and No signs or symptoms of PDPH

## 2024-08-21 NOTE — PLAN OF CARE
Problem: Adult Inpatient Plan of Care  Goal: Plan of Care Review  Outcome: Ongoing, Progressing  Goal: Patient-Specific Goal (Individualized)  Outcome: Ongoing, Progressing  Goal: Absence of Hospital-Acquired Illness or Injury  Outcome: Ongoing, Progressing  Intervention: Identify and Manage Fall Risk  Recent Flowsheet Documentation  Taken 8/21/2024 0400 by Kristine Branham RN  Safety Promotion/Fall Prevention: safety round/check completed  Taken 8/21/2024 0200 by Kristine Branham RN  Safety Promotion/Fall Prevention: safety round/check completed  Taken 8/21/2024 0000 by Kristine Branham RN  Safety Promotion/Fall Prevention: safety round/check completed  Taken 8/20/2024 2200 by Kristine Branham RN  Safety Promotion/Fall Prevention: safety round/check completed  Taken 8/20/2024 2015 by Kristine Branham RN  Safety Promotion/Fall Prevention:   clutter free environment maintained   assistive device/personal items within reach   fall prevention program maintained   nonskid shoes/slippers when out of bed   room organization consistent  Intervention: Prevent Skin Injury  Recent Flowsheet Documentation  Taken 8/20/2024 2015 by Kristine Branham RN  Body Position: position changed independently  Intervention: Prevent and Manage VTE (Venous Thromboembolism) Risk  Recent Flowsheet Documentation  Taken 8/21/2024 0135 by Kristine Branham RN  Activity Management: up ad barney  Taken 8/20/2024 2015 by Kristine Branham RN  Activity Management: up ad barney  Goal: Optimal Comfort and Wellbeing  Outcome: Ongoing, Progressing  Intervention: Monitor Pain and Promote Comfort  Recent Flowsheet Documentation  Taken 8/20/2024 2015 by Kristine Branham RN  Pain Management Interventions: around-the-clock dosing utilized  Intervention: Provide Person-Centered Care  Recent Flowsheet Documentation  Taken 8/20/2024 2015 by Kristine Branham RN  Trust Relationship/Rapport:   care explained   choices provided   emotional support provided   questions answered   questions  encouraged   reassurance provided  Goal: Readiness for Transition of Care  Outcome: Ongoing, Progressing     Problem: Adjustment to Role Transition (Postpartum  Delivery)  Goal: Successful Maternal Role Transition  Outcome: Ongoing, Progressing     Problem: Bleeding (Postpartum  Delivery)  Goal: Hemostasis  Outcome: Ongoing, Progressing     Problem: Infection (Postpartum  Delivery)  Goal: Absence of Infection Signs and Symptoms  Outcome: Ongoing, Progressing     Problem: Pain (Postpartum  Delivery)  Goal: Acceptable Pain Control  Outcome: Ongoing, Progressing  Intervention: Prevent or Manage Pain  Recent Flowsheet Documentation  Taken 2024 by Kristine Branham RN  Pain Management Interventions: around-the-clock dosing utilized     Problem: Postoperative Nausea and Vomiting (Postpartum  Delivery)  Goal: Nausea and Vomiting Relief  Outcome: Ongoing, Progressing     Problem: Postoperative Urinary Retention (Postpartum  Delivery)  Goal: Effective Urinary Elimination  Outcome: Ongoing, Progressing   Goal Outcome Evaluation:

## 2024-08-21 NOTE — LACTATION NOTE
08/21/24 1000   Maternal Information   Date of Referral 08/21/24   Person Making Referral lactation consultant  (Follow-up consult)   Maternal Reason for Referral   (Exclusively pumped for first baby x 6 weeks.  That child is now 15 years old)   Infant Reason for Referral 35-37 weeks gestation  (Reports mostly bottlefeeding formula.  States baby is leaking milk with 3 Similac nipples)   Milk Expression/Equipment   Breast Pump Type double electric, hospital grade;double electric, personal  (Hospital pump set up in room.  Has [employer]ra pump at home.)   Breast Pumping   Breast Pumping Interventions post-feed pumping encouraged  (Encouraged to pump after feedings to stimulate full milk supply.)     Teaching documented under education.  Patient will call for help with breast-feeding later today.

## 2024-08-22 LAB
CYTO UR: NORMAL
LAB AP CASE REPORT: NORMAL
LAB AP CLINICAL INFORMATION: NORMAL
PATH REPORT.FINAL DX SPEC: NORMAL
PATH REPORT.GROSS SPEC: NORMAL

## 2024-08-22 RX ADMIN — OXYCODONE HYDROCHLORIDE 5 MG: 5 TABLET ORAL at 02:51

## 2024-08-22 RX ADMIN — IBUPROFEN 600 MG: 600 TABLET ORAL at 18:24

## 2024-08-22 RX ADMIN — OXYCODONE HYDROCHLORIDE 10 MG: 10 TABLET ORAL at 16:57

## 2024-08-22 RX ADMIN — ACETAMINOPHEN 650 MG: 325 TABLET, FILM COATED ORAL at 04:00

## 2024-08-22 RX ADMIN — IBUPROFEN 600 MG: 600 TABLET ORAL at 13:15

## 2024-08-22 RX ADMIN — PRENATAL VITAMINS-IRON FUMARATE 27 MG IRON-FOLIC ACID 0.8 MG TABLET 1 TABLET: at 07:37

## 2024-08-22 RX ADMIN — IBUPROFEN 600 MG: 600 TABLET ORAL at 06:05

## 2024-08-22 RX ADMIN — ACETAMINOPHEN 650 MG: 325 TABLET, FILM COATED ORAL at 21:20

## 2024-08-22 RX ADMIN — SIMETHICONE 80 MG: 80 TABLET, CHEWABLE ORAL at 13:15

## 2024-08-22 RX ADMIN — OXYCODONE HYDROCHLORIDE 10 MG: 10 TABLET ORAL at 12:00

## 2024-08-22 RX ADMIN — OXYCODONE HYDROCHLORIDE 10 MG: 10 TABLET ORAL at 07:37

## 2024-08-22 RX ADMIN — ACETAMINOPHEN 650 MG: 325 TABLET, FILM COATED ORAL at 09:35

## 2024-08-22 RX ADMIN — ACETAMINOPHEN 650 MG: 325 TABLET, FILM COATED ORAL at 15:53

## 2024-08-22 RX ADMIN — IBUPROFEN 600 MG: 600 TABLET ORAL at 00:27

## 2024-08-22 RX ADMIN — OXYCODONE HYDROCHLORIDE 10 MG: 10 TABLET ORAL at 21:20

## 2024-08-22 NOTE — LACTATION NOTE
08/22/24 1240   Maternal Information   Date of Referral 08/22/24   Person Making Referral lactation consultant  (Courtesy visit)   Maternal Reason for Referral other (see comments)  (Mother stating all is going well with providing infant formula; stated she will pump when she gets home but formula for now; encouraged to call lactation as needed.)

## 2024-08-22 NOTE — PROGRESS NOTES
Postpartum Progress Note    Patient name: Ely De La Garza  YOB: 1985   MRN: [id number]  Referring Provider: Itzel Damon MD  Admission Date: 2024  Date of Service: 2024    ID: 38 y.o.     Diagnosis:   S/p  delivery 2 Days Post-Op     Placenta previa       Subjective:      No complaints.  Moderate lochia.  Ambulating, voiding, tolerating diet.  Pain is not well controlled-prefers to go home tomorrow.  The patient is currently breastfeeding.   This baby is a male-circumcision done today.    Objective:      Vital signs:  Vital Signs Range for the last 24 hours  Temperature: Temp:  [98 °F (36.7 °C)-98.7 °F (37.1 °C)] 98 °F (36.7 °C)   Temp Source: Temp src: Oral   BP: BP: (110-129)/(55-68) 113/59   Pulse: Heart Rate:  [79-97] 83   Respirations: Resp:  [16-18] 18   Weight: 94.3 kg (208 lb)     General: Alert & oriented x4, in no apparent distress  Abdomen: soft, nontender  Uterus: firm, nontender  Incision: clean, dry, intact, dressing clean, sutures  Extremities: nontender; no edema      Labs:  Lab Results   Component Value Date    WBC 9.05 2024    HGB 8.4 (L) 2024    HCT 25.1 (L) 2024    MCV 84.5 2024     (L) 2024     Results from last 7 days   Lab Units 24  0837   ABO TYPING  A   RH TYPING  Positive     External Prenatal Results       Pregnancy Outside Results - Transcribed From Office Records - See Scanned Records For Details       Test Value Date Time    ABO  A  24 0837    Rh  Positive  24 0837    Antibody Screen  Negative  24 0837       Negative  24 0157       Negative  24 1013       Negative  24 1021    Varicella IgG       Rubella  <0.90 index 24 1021    Hgb  8.4 g/dL 24 0823       11.1 g/dL 24 0837       11.0 g/dL 24 1149       10.6 g/dL 24 0101       10.9 g/dL 24 1013       12.8 g/dL 24 1138       13.3 g/dL 24 1021    Hct  25.1 % 24  0823       33.4 % 24 0837       34.8 % 24 1149       30.3 % 24 0101       32.7 % 24 1013       37.7 % 24 1138       40.0 % 24 1021    HgB A1c   5.1 % 24 1021    1h GTT  207 mg/dL 24 1013    3h GTT Fasting       3h GTT 1 hour       3h GTT 2 hour       3h GTT 3 hour        Gonorrhea (discrete)  Negative  24 1030    Chlamydia (discrete)  Negative  24 1030    RPR  Non Reactive  24 1013       Non Reactive  24 1021    Syphils cascade: TP-Ab (FTA)  Non-Reactive  24 0837    TP-Ab       TP-Ab (EIA)       TPPA       HBsAg  Negative  24 1021    Herpes Simplex Virus PCR       Herpes Simplex VIrus Culture       HIV  Non Reactive  24 1021    Hep C RNA Quant PCR       Hep C Antibody  Non Reactive  24 1021    AFP       NIPT       Cystic Fibroisis        Group B Strep  No Group B Streptococcus isolated  24 1804    GBS Susceptibility to Clindamycin       GBS Susceptibility to Erythromycin       Fetal Fibronectin       Genetic Testing, Maternal Blood                 Drug Screening       Test Value Date Time    Urine Drug Screen       Amphetamine Screen  Negative ng/mL 24 1030    Barbiturate Screen  Negative ng/mL 24 1030    Benzodiazepine Screen  Negative ng/mL 24 1030    Methadone Screen  Negative ng/mL 24 1030    Phencyclidine Screen  Negative ng/mL 24 1030    Opiates Screen       THC Screen       Cocaine Screen       Propoxyphene Screen  Negative ng/mL 24 1030    Buprenorphine Screen       Methamphetamine Screen       Oxycodone Screen       Tricyclic Antidepressants Screen                 Legend    ^: Historical                            Assessment/Plan:      2 Days Post-Op s/p Procedure(s):   SECTION REPEAT WITH SALPINGECTOMY  1. S/p  delivery: Continue postoperative care.  Doing well.  2. Infant feeding: Supportive care.  The patient is currently breastfeeding.  3. Asymptomatic  postoperative anemia: Vital signs stable. Denies symptoms. Will send home with oral iron supplement.    4. Rubella non-immune: offer MMR prior to discharge  5. Increasing pain today: Encouraged increase in ambulation, po hydration and OTC pain medicine.

## 2024-08-23 VITALS
BODY MASS INDEX: 33.43 KG/M2 | RESPIRATION RATE: 18 BRPM | DIASTOLIC BLOOD PRESSURE: 57 MMHG | WEIGHT: 208 LBS | HEART RATE: 82 BPM | HEIGHT: 66 IN | SYSTOLIC BLOOD PRESSURE: 119 MMHG | TEMPERATURE: 98.8 F | OXYGEN SATURATION: 99 %

## 2024-08-23 LAB
BH BB BLOOD EXPIRATION DATE: NORMAL
BH BB BLOOD EXPIRATION DATE: NORMAL
BH BB BLOOD TYPE BARCODE: 6200
BH BB BLOOD TYPE BARCODE: 6200
BH BB DISPENSE STATUS: NORMAL
BH BB DISPENSE STATUS: NORMAL
BH BB PRODUCT CODE: NORMAL
BH BB PRODUCT CODE: NORMAL
BH BB UNIT NUMBER: NORMAL
BH BB UNIT NUMBER: NORMAL
CROSSMATCH INTERPRETATION: NORMAL
CROSSMATCH INTERPRETATION: NORMAL
UNIT  ABO: NORMAL
UNIT  ABO: NORMAL
UNIT  RH: NORMAL
UNIT  RH: NORMAL

## 2024-08-23 PROCEDURE — 25010000002 MEASLES, MUMPS & RUBELLA VAC RECONSTITUTED SOLUTION: Performed by: OBSTETRICS & GYNECOLOGY

## 2024-08-23 PROCEDURE — 90707 MMR VACCINE SC: CPT | Performed by: OBSTETRICS & GYNECOLOGY

## 2024-08-23 PROCEDURE — 90471 IMMUNIZATION ADMIN: CPT | Performed by: OBSTETRICS & GYNECOLOGY

## 2024-08-23 RX ORDER — SIMETHICONE 80 MG
80 TABLET,CHEWABLE ORAL EVERY 6 HOURS PRN
Qty: 30 TABLET | Refills: 0 | Status: SHIPPED | OUTPATIENT
Start: 2024-08-23

## 2024-08-23 RX ORDER — IBUPROFEN 600 MG/1
600 TABLET, FILM COATED ORAL EVERY 6 HOURS
Qty: 30 TABLET | Refills: 0 | Status: SHIPPED | OUTPATIENT
Start: 2024-08-23

## 2024-08-23 RX ORDER — CEPHALEXIN 500 MG/1
500 CAPSULE ORAL 4 TIMES DAILY
Qty: 28 CAPSULE | Refills: 0 | Status: SHIPPED | OUTPATIENT
Start: 2024-08-23 | End: 2024-08-30

## 2024-08-23 RX ORDER — OXYCODONE HYDROCHLORIDE 5 MG/1
5 TABLET ORAL EVERY 6 HOURS PRN
Qty: 12 TABLET | Refills: 0 | Status: SHIPPED | OUTPATIENT
Start: 2024-08-23 | End: 2024-08-26

## 2024-08-23 RX ADMIN — ACETAMINOPHEN 650 MG: 325 TABLET, FILM COATED ORAL at 05:03

## 2024-08-23 RX ADMIN — IBUPROFEN 600 MG: 600 TABLET ORAL at 00:10

## 2024-08-23 RX ADMIN — OXYCODONE HYDROCHLORIDE 10 MG: 10 TABLET ORAL at 00:08

## 2024-08-23 RX ADMIN — ACETAMINOPHEN 650 MG: 325 TABLET, FILM COATED ORAL at 10:38

## 2024-08-23 RX ADMIN — OXYCODONE HYDROCHLORIDE 10 MG: 10 TABLET ORAL at 05:03

## 2024-08-23 RX ADMIN — OXYCODONE HYDROCHLORIDE 10 MG: 10 TABLET ORAL at 13:19

## 2024-08-23 RX ADMIN — OXYCODONE HYDROCHLORIDE 10 MG: 10 TABLET ORAL at 09:03

## 2024-08-23 RX ADMIN — PRENATAL VITAMINS-IRON FUMARATE 27 MG IRON-FOLIC ACID 0.8 MG TABLET 1 TABLET: at 09:03

## 2024-08-23 RX ADMIN — MEASLES, MUMPS, AND RUBELLA VIRUS VACCINE LIVE 0.5 ML: 1000; 12500; 1000 INJECTION, POWDER, LYOPHILIZED, FOR SUSPENSION SUBCUTANEOUS at 13:21

## 2024-08-23 RX ADMIN — IBUPROFEN 600 MG: 600 TABLET ORAL at 06:33

## 2024-08-23 NOTE — DISCHARGE SUMMARY
Discharge Summary    Date of Admission: 2024  Date of Discharge:  2024      Patient: Ely De La Garza      MR#:[id number]    Delivery Provider: Itzel Damon       Presenting Problem/History of Present Illness  Placenta previa [O44.00]       Placenta previa         Discharge Diagnosis:  repeat csection at 36w1d    Procedures:  , Low Transverse     2024    10:43 AM        Hospital Course  Patient is a 38 y.o. female  at 36w1d with placenta previa status post repeat csection without complication. Postpartum the patient did well. She remained afebrile, with vital signs stable. She was ready for discharge on postpartum day 3.   Incision is mildly red from incision to mons.  Trace clear drainage, no bleeding, appropriately tender.  Rajan line around redness.  She will call if it worsens.  Start Keflex.    Infant:   male  fetus 3005 g (6 lb 10 oz)  with Apgar scores of 8 , 9  at five minutes.    Condition on Discharge:  Stable    Vital Signs  Temp:  [98.2 °F (36.8 °C)-98.8 °F (37.1 °C)] 98.2 °F (36.8 °C)  Heart Rate:  [80-91] 80  Resp:  [16] 16  BP: (117-125)/(66-67) 125/67    Lab Results   Component Value Date    WBC 9.05 2024    HGB 8.4 (L) 2024    HCT 25.1 (L) 2024    MCV 84.5 2024     (L) 2024       Discharge Disposition  Home or Self Care    Discharge Medications     Discharge Medications        New Medications        Instructions Start Date   ibuprofen 600 MG tablet  Commonly known as: ADVIL,MOTRIN   600 mg, Oral, Every 6 Hours      oxyCODONE 5 MG immediate release tablet  Commonly known as: ROXICODONE   5 mg, Oral, Every 6 Hours PRN             Continue These Medications        Instructions Start Date   acetaminophen 500 MG tablet  Commonly known as: TYLENOL   1,000 mg, Oral, Once      freestyle lancets   Test fasting, and two hour following each meal      glucose monitor monitoring kit   Test fasting, and two hour following each meal       lansoprazole 30 MG capsule  Commonly known as: Prevacid   30 mg, Oral, Daily      magnesium oxide 400 MG tablet  Commonly known as: MAG-OX   400 mg, Oral, Daily      prenatal (CLASSIC) vitamin 28-0.8 MG tablet tablet  Generic drug: prenatal vitamin   Oral, Daily             Stop These Medications      folic acid 1 MG tablet  Commonly known as: FOLVITE     glucose blood test strip                Follow-up Appointments  No future appointments.  Additional Instructions for the Follow-ups that You Need to Schedule       Discharge Follow-up with Specified Provider: selin; 2 Weeks   As directed      To: selin   Follow Up: 2 Weeks                CHUCK Cortes  08/23/24  08:20 EDT  Csd

## 2024-08-30 ENCOUNTER — OFFICE VISIT (OUTPATIENT)
Dept: FAMILY MEDICINE CLINIC | Facility: CLINIC | Age: 39
End: 2024-08-30
Payer: COMMERCIAL

## 2024-08-30 VITALS
HEART RATE: 109 BPM | DIASTOLIC BLOOD PRESSURE: 70 MMHG | BODY MASS INDEX: 31.02 KG/M2 | SYSTOLIC BLOOD PRESSURE: 106 MMHG | TEMPERATURE: 98.6 F | OXYGEN SATURATION: 98 % | RESPIRATION RATE: 18 BRPM | WEIGHT: 193 LBS | HEIGHT: 66 IN

## 2024-08-30 DIAGNOSIS — D64.9 LOW HEMOGLOBIN: Primary | ICD-10-CM

## 2024-08-30 PROBLEM — D50.9 IRON DEFICIENCY ANEMIA: Status: ACTIVE | Noted: 2024-08-30

## 2024-08-30 LAB
EXPIRATION DATE: ABNORMAL
HGB BLDA-MCNC: 8.4 G/DL (ref 12–17)
Lab: ABNORMAL

## 2024-08-30 NOTE — ASSESSMENT & PLAN NOTE
Patient hemoglobin noted to drop from 11.4 to 8.4 in the postpartum period after undergoing  on .  At time of discharge patient's hemoglobin was noted to be stable at 8.4.  Today in office patient's hemoglobin remained stable at 8.4.  She is symptomatic with dizziness and fatigue.  She is also been experiencing decreased appetite, nausea and vomiting.  No signs of anemia such as paleness of mucous membranes or skin on physical exam.  No Signs of extreme blood loss.  States she has not been taking any iron supplementation and with decreased appetite has not been eating.  I have contacted the office of her OB/GYN, Dr. Damon.  I was advised to have patient seek further evaluation and treatment in the emergency department.

## 2024-08-30 NOTE — PROGRESS NOTES
"    Office Note     Name: Ely De La Garza    : 1985     MRN: [id number]     Chief Complaint  patient not eating, weak , dizzy and hurting from      Subjective     History of Present Illness:  Ely De La Garza is a 38 y.o. female who presents today for plaints of weakness, fatigue, dizziness, nausea and vomiting.  Patient is 10 days post  section.  Directly postoperatively her hemoglobin was noted to drop from 11.4 to 8.4.  Hemoglobin recheck in office today is stable at 8.4.  Patient denies any redness, inflammation, drainage or oozing from her incisional site.  No issues with fever, chills.  Patient denies any heavy bleeding, no more than expected postpartum.  She states that she is not had an appetite and anything she does eat or drink comes back up.  When asked if she has had decreased urine output she states she goes to the bathroom approximately every 3 hours when she gets up to feed her infant and will go \"maybe just a little bit\".  Patient has no further complaints or concerns today    Review of Systems   Constitutional:  Positive for fatigue. Negative for chills and fever.   Eyes:  Negative for blurred vision, double vision and visual disturbance.   Respiratory:  Negative for cough, chest tightness, shortness of breath and wheezing.    Cardiovascular:  Negative for chest pain, palpitations and leg swelling.   Gastrointestinal:  Positive for nausea and vomiting. Negative for abdominal pain, constipation and diarrhea.   Endocrine: Negative for polydipsia and polyuria.   Genitourinary:  Positive for decreased urine volume and vaginal bleeding. Negative for difficulty urinating, dyspareunia, dysuria and flank pain.   Musculoskeletal:  Negative for joint swelling and myalgias.   Skin:  Positive for wound. Negative for rash.   Neurological:  Positive for dizziness and light-headedness. Negative for headache.       Objective     Past Medical History:   Diagnosis Date    Abnormal " findings on diagnostic imaging of breast     microcalcification in right breast    Contact with and (suspected) exposure to other viral communicable diseases     Depression     Encounter for insertion of mirena IUD 2019    Due for removal 2025    Esophageal reflux     Female infertility     took fertility meds to get pregnant    Generalized anxiety disorder     GERD (gastroesophageal reflux disease)     Herpes zoster     Shingles    History of COVID-19     Hx of Gestational hypertension 2009    was overweight at that time    Hx of Polycystic ovary syndrome     Hx of Trochanteric bursitis of left hip     Left hip replacement 2023    Hx of Type 2 diabetes mellitus     never on insulin/metformin -  lost weight 8 years ago    Migraine without aura, not intractable, without status migrainosus     Nausea and vomiting     currently with pregnancy    Obese     Placenta previa antepartum 2024    Posterior Marginal    PONV (postoperative nausea and vomiting)     Pregnancy 2024    Sacrococcygeal disorders, not elsewhere classified     Wears contact lenses     Wears eyeglasses      Past Surgical History:   Procedure Laterality Date    ABDOMINOPLASTY      BODY LIFT N/A 2017    Procedure: CIRCUMFERENTIAL BODY LIFT;  Surgeon: Enrique Leroy MD;  Location:  [facility];  Service:     BODY LIFT Bilateral 2019    Procedure: BILATERAL THIGH  LIFT WITH LIPOSUCTION;  Surgeon: Enrique Leroy MD;  Location:  [facility];  Service: Plastics    BREAST AUGMENTATION       SECTION       SECTION N/A 2024    Procedure:  SECTION REPEAT WITH SALPINGECTOMY;  Surgeon: Itzel Damon MD;  [facility];  Service: Obstetrics/Gynecology;  Laterality: N/A;    GASTRIC SLEEVE LAPAROSCOPIC  08/10/2020    INTRAUTERINE DEVICE INSERTION  2019    Mirena; due for removal 2025    LIPOMA EXCISION      LIPOSUCTION ABDOMINAL N/A 2019    Procedure:  "LIPOSUCTION ABDOMINAL;  Surgeon: Enrique Leroy MD;  Location: [facility];  Service: Plastics    LUNG BIOPSY      OTHER SURGICAL HISTORY      Back Lift    REPLACEMENT TOTAL HIP LATERAL POSITION Left     2023; born with hip dysplasia    TONSILLECTOMY      WISDOM TOOTH EXTRACTION       Family History   Problem Relation Age of Onset    Heart disease Father     Diabetes Father     Cancer Father     Heart disease Mother     Heart disease Maternal Grandmother     Thyroid disease Maternal Grandmother     Cervical cancer Maternal Grandmother     Uterine cancer Maternal Grandmother     Hypertension Other     Obesity Other     Hypertension Other     Breast cancer Other         2 SISTERS OF MATERNAL GRANDFATHER       Vital Signs  /70 (BP Location: Left arm, Patient Position: Sitting, Cuff Size: Adult)   Pulse 109   Temp 98.6 °F (37 °C) (Temporal)   Resp 18   Ht 167.6 cm (66\")   Wt 87.5 kg (193 lb)   SpO2 98%   BMI 31.15 kg/m²   Estimated body mass index is 31.15 kg/m² as calculated from the following:    Height as of this encounter: 167.6 cm (66\").    Weight as of this encounter: 87.5 kg (193 lb).  Facility age limit for growth %rob is 20 years.    Physical Exam  Vitals reviewed.   HENT:      Head: Normocephalic and atraumatic.      Nose: Nose normal.      Mouth/Throat:      Mouth: Mucous membranes are moist.      Pharynx: Oropharynx is clear.   Eyes:      Conjunctiva/sclera: Conjunctivae normal.      Pupils: Pupils are equal, round, and reactive to light.   Cardiovascular:      Rate and Rhythm: Normal rate and regular rhythm.      Pulses: Normal pulses.      Heart sounds: Normal heart sounds.   Pulmonary:      Effort: Pulmonary effort is normal.      Breath sounds: Normal breath sounds.   Abdominal:      General: Bowel sounds are normal. There is no distension.      Palpations: Abdomen is soft.   Musculoskeletal:         General: Normal range of motion.      Cervical back: Neck supple.   Skin:     General: " Skin is warm and dry.   Neurological:      Mental Status: She is alert and oriented to person, place, and time.   Psychiatric:         Mood and Affect: Affect is tearful.         Behavior: Behavior is cooperative.          POCT Results (if applicable):  Results for orders placed or performed in visit on 24   POCT hemoglobin    Specimen: Blood   Result Value Ref Range    Hemoglobin 8.4 (A) 12.0 - 17.0 g/dL    Lot Number [id number]     Expiration Date              Assessment and Plan     Diagnoses and all orders for this visit:    1. Low hemoglobin (Primary)  -     POCT hemoglobin    2. Postpartum anemia  Assessment & Plan:  Patient hemoglobin noted to drop from 11.4 to 8.4 in the postpartum period after undergoing  on .  At time of discharge patient's hemoglobin was noted to be stable at 8.4.  Today in office patient's hemoglobin remained stable at 8.4.  She is symptomatic with dizziness and fatigue.  She is also been experiencing decreased appetite, nausea and vomiting.  No signs of anemia such as paleness of mucous membranes or skin on physical exam.  No Signs of extreme blood loss.  States she has not been taking any iron supplementation and with decreased appetite has not been eating.  I have contacted the office of her OB/GYN, Dr. Damon.  I was advised to have patient seek further evaluation and treatment in the emergency department.            Follow Up  No follow-ups on file.    CHUCK Castañeda

## 2024-09-04 ENCOUNTER — POSTPARTUM VISIT (OUTPATIENT)
Dept: OBSTETRICS AND GYNECOLOGY | Facility: CLINIC | Age: 39
End: 2024-09-04
Payer: COMMERCIAL

## 2024-09-04 VITALS
BODY MASS INDEX: 31.34 KG/M2 | SYSTOLIC BLOOD PRESSURE: 112 MMHG | WEIGHT: 195 LBS | HEIGHT: 66 IN | DIASTOLIC BLOOD PRESSURE: 80 MMHG

## 2024-09-04 PROBLEM — O44.00 PLACENTA PREVIA ANTEPARTUM: Status: RESOLVED | Noted: 2024-04-01 | Resolved: 2024-09-04

## 2024-09-04 PROBLEM — O09.529 ANTEPARTUM MULTIGRAVIDA OF ADVANCED MATERNAL AGE: Status: RESOLVED | Noted: 2024-02-07 | Resolved: 2024-09-04

## 2024-09-04 PROBLEM — Z34.90 PREGNANCY: Status: RESOLVED | Noted: 2024-02-07 | Resolved: 2024-09-04

## 2024-09-04 PROBLEM — O44.00 PLACENTA PREVIA: Status: RESOLVED | Noted: 2024-08-20 | Resolved: 2024-09-04

## 2024-09-04 PROBLEM — Z98.891 PREVIOUS CESAREAN SECTION: Status: RESOLVED | Noted: 2024-02-07 | Resolved: 2024-09-04

## 2024-09-04 PROBLEM — O36.8390 FETAL TACHYCARDIA AFFECTING MANAGEMENT OF MOTHER: Status: RESOLVED | Noted: 2024-08-09 | Resolved: 2024-09-04

## 2024-09-04 PROBLEM — O24.410 DIET CONTROLLED GESTATIONAL DIABETES MELLITUS (GDM), ANTEPARTUM: Status: RESOLVED | Noted: 2024-08-13 | Resolved: 2024-09-04

## 2024-09-04 PROBLEM — O60.03 PRETERM LABOR IN THIRD TRIMESTER WITHOUT DELIVERY: Status: RESOLVED | Noted: 2024-07-21 | Resolved: 2024-09-04

## 2024-09-04 PROBLEM — Z87.51 HISTORY OF PRETERM DELIVERY: Status: RESOLVED | Noted: 2024-02-07 | Resolved: 2024-09-04

## 2024-09-04 PROBLEM — O09.899 RUBELLA NON-IMMUNE STATUS, ANTEPARTUM: Status: RESOLVED | Noted: 2024-06-25 | Resolved: 2024-09-04

## 2024-09-04 PROBLEM — Z28.39 RUBELLA NON-IMMUNE STATUS, ANTEPARTUM: Status: RESOLVED | Noted: 2024-06-25 | Resolved: 2024-09-04

## 2024-09-04 RX ORDER — OXYCODONE AND ACETAMINOPHEN 5; 325 MG/1; MG/1
TABLET ORAL
COMMUNITY
Start: 2024-08-30

## 2024-09-04 RX ORDER — PAROXETINE 10 MG/1
1 TABLET, FILM COATED ORAL DAILY
COMMUNITY
Start: 2024-08-29

## 2024-09-04 RX ORDER — PROCHLORPERAZINE MALEATE 10 MG
1 TABLET ORAL 3 TIMES DAILY
COMMUNITY
Start: 2024-08-30

## 2024-09-04 NOTE — PROGRESS NOTES
"      Chief Complaint   Patient presents with    Postpartum Care     2 week       Postpartum Visit         Ely De La Garza is a 38 y.o.  who presents today for a 2 week(s) postpartum check.      C/S: repeat     , Low Transverse    Information for the patient's :  Bruce De La Garza [[id number]]   2024   male   Bruce De La Garza   3005 g (6 lb 10 oz)   Gestational Age: 36w1d      Baby Discharged with Mom  Delivering MD: Itzel Damon MD.    Pregnancy complications: gestational DM , Placenta previa    Patient reports her incision is clean, dry, intact.  Patient describes vaginal bleeding as light. She is bottle feeding. She would like to discuss the following complaints today: Patient reports having postpartum depression. She has seen her counselor and have restarted Paxil and Vraylar. She also reports having severe pain on the right side of her incision.    She had salpingectomy at time of delivery for contraception.    Patient reports concerns for postpartum depression/anxiety. Patient denies  suicidal or homicidal ideation. Her postpartum depression screening questionnaire: 21. She is currently being treated with paxil and vraylar, that was restarted this week by her psychiatrist. She feels it is not helping. Yet.       The additional following portions of the patient's history were reviewed and updated as appropriate: allergies, current medications, past family history, past medical history, past social history, past surgical history, and problem list.      Review of Systems   All other systems reviewed and are negative.      I have reviewed and agree with the HPI, ROS, and historical information as entered above. Itzel Damon MD      Objective   /80   Ht 167.6 cm (65.98\")   Wt 88.5 kg (195 lb)   LMP 2023   Breastfeeding No   BMI 31.49 kg/m²     Physical Exam  Vitals and nursing note reviewed.   Constitutional:       Appearance: She is well-developed.   HENT:      Head: " Normocephalic and atraumatic.   Pulmonary:      Effort: Pulmonary effort is normal.   Abdominal:      General: A surgical scar is present.      Palpations: Abdomen is soft. Abdomen is not rigid.      Comments: Clean, Dry, and Intact.  No erythema.    Musculoskeletal:      Cervical back: Normal range of motion.   Neurological:      Mental Status: She is alert and oriented to person, place, and time.   Psychiatric:         Mood and Affect: Mood normal.         Behavior: Behavior normal.              Assessment and Plan    Problem List Items Addressed This Visit    None  Visit Diagnoses       Postpartum exam    -  Primary            S/p , 2 week(s) postpartum.  Having significant pain above the right side of her incision. Incision is closed, no evidence of infection, but given tenderness will rx week of augmentin in case infection/ fluid is causing her symptoms. She also is still fairly anemic, had it checked at pcp on Friday, and was same as when she left hospital. She cannot take PO iron with her history of bariatric surgery. She is interested in iv iron infusion, will try and sched at [facility] near her house.   Continued pelvic rest with a return to driving and light physical activity.  Baby doing well. Bottle feeding. Her PP depression is being managed by psych. Meds restarted last week. She will call if not getting better.  Contraception: contraceptive methods: Tubal ligation  Return in about 1 month (around 10/4/2024) for Postpartum.    Itzel Damon MD  2024

## 2024-09-06 ENCOUNTER — MATERNAL SCREENING (OUTPATIENT)
Dept: CALL CENTER | Facility: HOSPITAL | Age: 39
End: 2024-09-06
Payer: COMMERCIAL

## 2024-09-06 NOTE — OUTREACH NOTE
Maternal Screening Survey      Flowsheet Row Responses   Facility patient discharged from? [facility]   Attempt successful? No   Unsuccessful attempts Attempt 1  [left msg on vm]              TRIP VALENZUELA - Registered Nurse

## 2024-09-06 NOTE — OUTREACH NOTE
Maternal Screening Survey      Flowsheet Row Responses   Facility patient discharged from? [facility]   Attempt successful? No   Unsuccessful attempts Attempt 2              TRIP VALENZUELA - Registered Nurse

## 2024-09-07 ENCOUNTER — MATERNAL SCREENING (OUTPATIENT)
Dept: CALL CENTER | Facility: HOSPITAL | Age: 39
End: 2024-09-07
Payer: COMMERCIAL

## 2024-09-07 NOTE — OUTREACH NOTE
Maternal Screening Survey      Flowsheet Row Responses   Facility patient discharged from? [facility]   Attempt successful? No   Unsuccessful attempts Attempt 3   Revoke Decline to participate              RALPH SERVIN - Registered Nurse

## 2024-09-13 ENCOUNTER — TELEPHONE (OUTPATIENT)
Dept: OBSTETRICS AND GYNECOLOGY | Facility: CLINIC | Age: 39
End: 2024-09-13
Payer: COMMERCIAL

## 2024-09-13 NOTE — TELEPHONE ENCOUNTER
[facility] CALLING TO GET PT'S FERRITIN AND TSTAT% RESULT   IN ORDER TO GET HER INFUSION AUTHORIZED   CALLBACK NUMBER [phone] AND FAX FOR THE RESULTS [phone]

## 2024-09-13 NOTE — TELEPHONE ENCOUNTER
S/w patient & notified her that labs were needed in order to get her Iron infusions approved. Patient states that she will have labs damian on Monday.    Message left with [facility] that patient would be having labs drawn on Monday & we would fax those results over.

## 2024-10-01 ENCOUNTER — POSTPARTUM VISIT (OUTPATIENT)
Dept: OBSTETRICS AND GYNECOLOGY | Facility: CLINIC | Age: 39
End: 2024-10-01
Payer: COMMERCIAL

## 2024-10-01 VITALS
HEIGHT: 66 IN | BODY MASS INDEX: 31.02 KG/M2 | SYSTOLIC BLOOD PRESSURE: 130 MMHG | DIASTOLIC BLOOD PRESSURE: 84 MMHG | WEIGHT: 193 LBS

## 2024-10-01 PROCEDURE — 0503F POSTPARTUM CARE VISIT: CPT

## 2024-10-01 RX ORDER — CARIPRAZINE 1.5 MG/1
1.5 CAPSULE, GELATIN COATED ORAL
COMMUNITY
Start: 2024-09-28

## 2024-10-01 RX ORDER — PAROXETINE 20 MG/1
20 TABLET, FILM COATED ORAL
COMMUNITY
Start: 2024-09-26

## 2024-10-01 NOTE — PROGRESS NOTES
Chief Complaint   Patient presents with    Postpartum Care       Postpartum Visit         Ely De La Garza is a 39 y.o.  who presents today for a 6 week(s) postpartum check.     C/S: repeat and placenta previa/low lying placenta     , Low Transverse    Information for the patient's :  Bruce De La Garza [[id number]]   2024   male   Bruce De La Garza   3005 g (6 lb 10 oz)   Gestational Age: 36w1d        Baby Discharged: Discharged with Mom  Delivering Physician: Itzel Damon MD    Her pregnancy was complicated by gestational DM . The incision is healing well. Patient describes vaginal bleeding as light.  Patient is bottle feeding.  She had a tubal ligation for contraception.      She would like to discuss the following complaints today: She states that she is needing to check labs to assess need for iron infusions.    Patient denies concerns for postpartum depression/anxiety. Patient denies  suicidal or homicidal ideation. Her postpartum depression screening questionnaire: score=3. She is currently being treated with paxil and vraylar. She feels it is helping.      Last Pap : 2022. Results: negative. HPV: not done. Her last HPV testing was  negative..   Last Completed Pap Smear            PAP SMEAR (Every 3 Years) Next due on 2022  Pap IG, Rfx HPV ASCU    2021  Pap IG, HPV-hr    2019  Done - Negative                      The additional following portions of the patient's history were reviewed and updated as appropriate: allergies, current medications, past medical history, past social history, past surgical history, and problem list.    Review of Systems   Constitutional: Negative.    HENT: Negative.     Eyes: Negative.    Respiratory: Negative.     Cardiovascular: Negative.    Gastrointestinal: Negative.    Endocrine: Negative.    Genitourinary: Negative.    Musculoskeletal: Negative.    Skin: Negative.    Allergic/Immunologic: Negative.   "  Neurological: Negative.    Hematological: Negative.    Psychiatric/Behavioral: Negative.       All other systems reviewed and are negative.     I have reviewed and agree with the HPI, ROS, and historical information as entered above. Tiny Perezser, APRN      /84   Ht 167.6 cm (66\")   Wt 87.5 kg (193 lb)   LMP 2023   Breastfeeding No   BMI 31.15 kg/m²     Physical Exam  Vitals and nursing note reviewed.   Constitutional:       Appearance: She is well-developed.   HENT:      Head: Normocephalic and atraumatic.   Pulmonary:      Effort: Pulmonary effort is normal.   Abdominal:      General: A surgical scar is present.      Palpations: Abdomen is soft. Abdomen is not rigid.          Comments: Clean, Dry, and Intact.  No erythema.    Musculoskeletal:      Cervical back: Normal range of motion.   Neurological:      Mental Status: She is alert and oriented to person, place, and time.   Psychiatric:         Mood and Affect: Mood normal.         Behavior: Behavior normal.             Assessment and Plan    Problem List Items Addressed This Visit    None  Visit Diagnoses       Postpartum anemia    -  Primary    Relevant Orders    CBC & Differential    Ferritin    Postpartum follow-up                S/p , 6 week(s) postpartum.  Doing well.    Return to normal physical activity.  No pelvic restrictions.   Iron deficiency anemia-will be getting transfusions at hospital closer to her home. Labs needed to draw today for orders.   Baby doing well.  No si/sx of postpartum depression  Contraception: contraceptive methods: Tubal ligation  Follow up PRN and annually    Tiny Perezser, APRN  10/01/2024  "

## 2024-10-02 ENCOUNTER — TELEPHONE (OUTPATIENT)
Dept: OBSTETRICS AND GYNECOLOGY | Facility: CLINIC | Age: 39
End: 2024-10-02
Payer: COMMERCIAL

## 2024-10-02 LAB
BASOPHILS # BLD AUTO: 0.04 10*3/MM3 (ref 0–0.2)
BASOPHILS NFR BLD AUTO: 0.6 % (ref 0–1.5)
EOSINOPHIL # BLD AUTO: 0.07 10*3/MM3 (ref 0–0.4)
EOSINOPHIL NFR BLD AUTO: 1.1 % (ref 0.3–6.2)
ERYTHROCYTE [DISTWIDTH] IN BLOOD BY AUTOMATED COUNT: 14 % (ref 12.3–15.4)
FERRITIN SERPL-MCNC: 22 NG/ML (ref 13–150)
HCT VFR BLD AUTO: 36.1 % (ref 34–46.6)
HGB BLD-MCNC: 10.9 G/DL (ref 12–15.9)
IMM GRANULOCYTES # BLD AUTO: 0.02 10*3/MM3 (ref 0–0.05)
IMM GRANULOCYTES NFR BLD AUTO: 0.3 % (ref 0–0.5)
LYMPHOCYTES # BLD AUTO: 1.39 10*3/MM3 (ref 0.7–3.1)
LYMPHOCYTES NFR BLD AUTO: 22.3 % (ref 19.6–45.3)
MCH RBC QN AUTO: 25.1 PG (ref 26.6–33)
MCHC RBC AUTO-ENTMCNC: 30.2 G/DL (ref 31.5–35.7)
MCV RBC AUTO: 83 FL (ref 79–97)
MONOCYTES # BLD AUTO: 0.38 10*3/MM3 (ref 0.1–0.9)
MONOCYTES NFR BLD AUTO: 6.1 % (ref 5–12)
NEUTROPHILS # BLD AUTO: 4.32 10*3/MM3 (ref 1.7–7)
NEUTROPHILS NFR BLD AUTO: 69.6 % (ref 42.7–76)
NRBC BLD AUTO-RTO: 0 /100 WBC (ref 0–0.2)
PLATELET # BLD AUTO: 277 10*3/MM3 (ref 140–450)
RBC # BLD AUTO: 4.35 10*6/MM3 (ref 3.77–5.28)
WBC # BLD AUTO: 6.22 10*3/MM3 (ref 3.4–10.8)

## 2024-10-02 NOTE — TELEPHONE ENCOUNTER
Isa from [facility] called regarding patients infusion orders, and stated that she had still not received the patients labs.  Reviewed labs in chart, and they had not yet been completed.  Will call patient to see if she has completed, and call back.      Called and spoke with Ely.  She has not yet completed her labs, due to baby being sick.  Is coming in for appt 10/1, and will complete then.     Returned call to Isa, and advised of above.  Will send once we receive results.

## 2024-10-10 ENCOUNTER — TELEPHONE (OUTPATIENT)
Dept: OBSTETRICS AND GYNECOLOGY | Facility: CLINIC | Age: 39
End: 2024-10-10

## 2024-10-10 NOTE — TELEPHONE ENCOUNTER
Caller: Ely De La Garza    Relationship: Self    Best call back number: [phone]    What is the best time to reach you: ANY    Who are you requesting to speak with (clinical staff, provider,  specific staff member):      What was the call regarding: PT NEEDS TO MEENAKSHI IUD INSERTION 10/11/24       The patient's goals for the shift include maintain appropriate feedings.   The clinical goals for the shift include maintain stable vital signs and assessments.

## 2024-11-15 ENCOUNTER — OFFICE VISIT (OUTPATIENT)
Dept: FAMILY MEDICINE CLINIC | Facility: CLINIC | Age: 39
End: 2024-11-15
Payer: COMMERCIAL

## 2024-11-15 VITALS
OXYGEN SATURATION: 98 % | HEIGHT: 66 IN | SYSTOLIC BLOOD PRESSURE: 122 MMHG | TEMPERATURE: 97.8 F | WEIGHT: 206.8 LBS | RESPIRATION RATE: 18 BRPM | BODY MASS INDEX: 33.23 KG/M2 | HEART RATE: 82 BPM | DIASTOLIC BLOOD PRESSURE: 80 MMHG

## 2024-11-15 DIAGNOSIS — Z28.21 COVID-19 VACCINATION DECLINED: ICD-10-CM

## 2024-11-15 DIAGNOSIS — G43.109 MIGRAINE WITH AURA AND WITHOUT STATUS MIGRAINOSUS, NOT INTRACTABLE: ICD-10-CM

## 2024-11-15 DIAGNOSIS — D64.9 ANEMIA, UNSPECIFIED TYPE: ICD-10-CM

## 2024-11-15 DIAGNOSIS — Z90.3 HISTORY OF SLEEVE GASTRECTOMY: ICD-10-CM

## 2024-11-15 DIAGNOSIS — Z23 INFLUENZA VACCINATION ADMINISTERED AT CURRENT VISIT: ICD-10-CM

## 2024-11-15 DIAGNOSIS — K21.9 GASTROESOPHAGEAL REFLUX DISEASE, UNSPECIFIED WHETHER ESOPHAGITIS PRESENT: ICD-10-CM

## 2024-11-15 DIAGNOSIS — E66.9 MILD OBESITY: Primary | ICD-10-CM

## 2024-11-15 LAB
EXPIRATION DATE: ABNORMAL
HGB BLDA-MCNC: 11.3 G/DL (ref 12–17)
Lab: ABNORMAL

## 2024-11-15 RX ORDER — PANTOPRAZOLE SODIUM 40 MG/1
40 TABLET, DELAYED RELEASE ORAL DAILY
Qty: 90 TABLET | Refills: 3 | Status: SHIPPED | OUTPATIENT
Start: 2024-11-15

## 2024-11-15 RX ORDER — SUMATRIPTAN 100 MG/1
TABLET, FILM COATED ORAL
Qty: 9 TABLET | Refills: 5 | Status: SHIPPED | OUTPATIENT
Start: 2024-11-15

## 2024-11-15 NOTE — ASSESSMENT & PLAN NOTE
Patient is status post sleeve gastrectomy 8/2020, having lost per patient account well over 100 pounds, but has been slowly regaining weight, approximately now increased to 40 pounds from her weight loss oneal, increased 13 pounds over the last 6 weeks.  We discussed treatment options, and discussion mechanism of action as well as potential side effects as well as contraindications of GLP-1/GIP agonist, will initiate Zepbound 2.5 mg subcu weekly, reassessing 1 month with anticipated titration up as tolerated per standard protocol.  Advise if problems in the interim.

## 2024-11-15 NOTE — ASSESSMENT & PLAN NOTE
Currently having daily symptoms, no longer on lansoprazole.  Previously had better control with pantoprazole 40 mg daily which will reinitiate.  Advise for any significant layers with initiation of Zepbound

## 2024-11-15 NOTE — ASSESSMENT & PLAN NOTE
Migraine headaches previously having been on Topamax suppression with good control, having discontinued Topamax during recent pregnancy.  She is only now having 1 migraine headache monthly, thus will avoid suppressive therapy and simply treat with Imitrex orally as needed.  If her migraine frequency or severity becomes more prominent, then we will reconsider initiation of Topamax at that point.

## 2024-11-15 NOTE — ASSESSMENT & PLAN NOTE
Part anemia secondary significant menstrual bleeding.  Still having monthly menses.  Hemoglobin today improved to 11.3 versus prior value of 10.96 weeks earlier and 8.4 now 3 months earlier.  Given improvement, will simply improve iron intake with diet, avoiding iron sulfate at this time.  Reassess at a complete physical to be scheduled in 1 month.

## 2024-11-15 NOTE — PROGRESS NOTES
Follow Up Office Visit      Date: 11/15/2024   Patient Name: Ely De La Garza  : 1985   MRN: [id number]     Chief Complaint:    Chief Complaint   Patient presents with    DISCUSS WEIGHT LOSS RX, MIGRAINE MEDICATION       History of Present Illness: Ely De La Garza is a 39 y.o. female who is here today for about starting Zepbound for weight loss.  She is status post sleeve gastrectomy in , having lost per her account well over 100 pounds, but has recently started to regain the weight.  She status post delivery of child a couple months ago, and feels it has been hard to lose some of the weight.  She in fact is gained 13 pounds in weight in the last 6 weeks, post delivery.  She has been exercising for 30 minutes 3 times weekly, has eliminated sweet drinks, eating some frozen vegetables though still limited fruits, not consuming any significant breads.  She also notes having significant dyspepsia since stopping her lansoprazole as prescribed by her gynecologist.  She feels previous pantoprazole had worked better and would like to reinitiate this.  No dysphagia, no nausea or vomiting, previous constipation which seems to resolved at this time.  Also history of migraine headaches, for which she in the past had taken Topamax, this having been discontinued by her obstetrician during her recent pregnancy.  She is having weekly severe migraine headaches, but now is just having a single migraine once a month.  Typically causes some photo and phonophobia with some nausea.  Also has a history of anemia postpartum, related to excessive menses, having had a hemoglobin of 8.4 in 2024 increased to 10.96 weeks ago on 10/1/2024.  She still has monthly heavy menses and is to discuss with GYN appropriate treatments..    Subjective      Review of Systems:   Review of Systems    I have reviewed the patients family history, social history, past medical history, past surgical history and have updated it as  "appropriate.     Medications:     Current Outpatient Medications:     PARoxetine (PAXIL) 20 MG tablet, 1 tablet., Disp: , Rfl:     Vraylar 1.5 MG capsule capsule, 1 capsule., Disp: , Rfl:     pantoprazole (Protonix) 40 MG EC tablet, Take 1 tablet by mouth Daily., Disp: 90 tablet, Rfl: 3    SUMAtriptan (Imitrex) 100 MG tablet, Take one tablet at onset of headache. May repeat dose one time in 2 hours if headache not relieved., Disp: 9 tablet, Rfl: 5    Tirzepatide-Weight Management (ZEPBOUND) 2.5 MG/0.5ML solution auto-injector, Inject 0.5 mL under the skin into the appropriate area as directed 1 (One) Time Per Week., Disp: 2 mL, Rfl: 0    Allergies:   No Known Allergies    Objective     Physical Exam: Please see above  Vital Signs:   Vitals:    11/15/24 0822   BP: 122/80   BP Location: Left arm   Patient Position: Sitting   Cuff Size: Adult   Pulse: 82   Resp: 18   Temp: 97.8 °F (36.6 °C)   TempSrc: Temporal   SpO2: 98%   Weight: 93.8 kg (206 lb 12.8 oz)   Height: 167.6 cm (66\")   PainSc: 0-No pain     Body mass index is 33.38 kg/m².  BMI is >= 30 and <35. (Class 1 Obesity). The following options were offered after discussion;: exercise counseling/recommendations, nutrition counseling/recommendations, and pharmacological intervention options       Physical Exam  Constitutional:       General: She is not in acute distress.     Appearance: Normal appearance. She is obese. She is not ill-appearing.      Comments: Healthy pleasant NAD, BMI 33.3 reflecting a 13 pound weight gain in the last 6 weeks   Cardiovascular:      Rate and Rhythm: Normal rate and regular rhythm.      Heart sounds: Normal heart sounds. No murmur heard.     No friction rub. No gallop.   Pulmonary:      Effort: Pulmonary effort is normal. No respiratory distress.      Breath sounds: Normal breath sounds.   Abdominal:      General: Bowel sounds are normal. There is no distension.      Palpations: Abdomen is soft. There is no mass.      Tenderness: " There is no abdominal tenderness. There is no guarding or rebound.      Hernia: No hernia is present.   Musculoskeletal:      Cervical back: No rigidity or tenderness.   Lymphadenopathy:      Cervical: No cervical adenopathy.   Skin:     Findings: No lesion or rash.   Neurological:      General: No focal deficit present.      Mental Status: She is alert.   Psychiatric:         Mood and Affect: Mood normal.         Procedures    Results:   Labs:   Hemoglobin A1C   Date Value Ref Range Status   02/07/2024 5.1 4.8 - 5.6 % Final     Comment:              Prediabetes: 5.7 - 6.4           Diabetes: >6.4           Glycemic control for adults with diabetes: <7.0       TSH   Date Value Ref Range Status   02/07/2024 1.300 0.450 - 4.500 uIU/mL Final        POCT Results (if applicable):   Results for orders placed or performed in visit on 11/15/24   POC Hemoglobin    Collection Time: 11/15/24  9:21 AM    Specimen: Blood   Result Value Ref Range    Hemoglobin 11.3 (A) 12.0 - 17.0 g/dL    Lot Number [id number]     Expiration Date 04/21/2025        Assessment / Plan      Assessment/Plan:   Diagnoses and all orders for this visit:    1. Mild obesity (Primary)  Assessment & Plan:  Patient is status post sleeve gastrectomy 8/2020, having lost per patient account well over 100 pounds, but has been slowly regaining weight, approximately now increased to 40 pounds from her weight loss oneal, increased 13 pounds over the last 6 weeks.  We discussed treatment options, and discussion mechanism of action as well as potential side effects as well as contraindications of GLP-1/GIP agonist, will initiate Zepbound 2.5 mg subcu weekly, reassessing 1 month with anticipated titration up as tolerated per standard protocol.  Advise if problems in the interim.    Orders:  -     Tirzepatide-Weight Management (ZEPBOUND) 2.5 MG/0.5ML solution auto-injector; Inject 0.5 mL under the skin into the appropriate area as directed 1 (One) Time Per Week.   Dispense: 2 mL; Refill: 0    2. History of sleeve gastrectomy  Assessment & Plan:  Patient is status post sleeve gastrectomy 8/2020, having lost per patient account well over 100 pounds, but has been slowly regaining weight, approximately now increased to 40 pounds from her weight loss oneal, increased 13 pounds over the last 6 weeks.  We discussed treatment options, and discussion mechanism of action as well as potential side effects as well as contraindications of GLP-1/GIP agonist, will initiate Zepbound 2.5 mg subcu weekly, reassessing 1 month with anticipated titration up as tolerated per standard protocol.  Advise if problems in the interim.      3. Migraine with aura and without status migrainosus, not intractable  Assessment & Plan:  Migraine headaches previously having been on Topamax suppression with good control, having discontinued Topamax during recent pregnancy.  She is only now having 1 migraine headache monthly, thus will avoid suppressive therapy and simply treat with Imitrex orally as needed.  If her migraine frequency or severity becomes more prominent, then we will reconsider initiation of Topamax at that point.        Orders:  -     SUMAtriptan (Imitrex) 100 MG tablet; Take one tablet at onset of headache. May repeat dose one time in 2 hours if headache not relieved.  Dispense: 9 tablet; Refill: 5    4. Gastroesophageal reflux disease, unspecified whether esophagitis present  Assessment & Plan:  Currently having daily symptoms, no longer on lansoprazole.  Previously had better control with pantoprazole 40 mg daily which will reinitiate.  Advise for any significant layers with initiation of Zepbound    Orders:  -     pantoprazole (Protonix) 40 MG EC tablet; Take 1 tablet by mouth Daily.  Dispense: 90 tablet; Refill: 3    5. Anemia, unspecified type  Assessment & Plan:  Part anemia secondary significant menstrual bleeding.  Still having monthly menses.  Hemoglobin today improved to 11.3 versus  prior value of 10.96 weeks earlier and 8.4 now 3 months earlier.  Given improvement, will simply improve iron intake with diet, avoiding iron sulfate at this time.  Reassess at a complete physical to be scheduled in 1 month.    Orders:  -     POC Hemoglobin    6. Influenza vaccination administered at current visit  -     Fluzone >6mos    7. COVID-19 vaccination declined        Follow Up:   Return in about 1 month (around 12/15/2024) for Annual physical.      At [facility], we believe that sharing information builds trust and better relationships. You are receiving this note because you recently visited [facility]. It is possible you will see health information before a provider has talked with you about it. This kind of information can be easy to misunderstand. To help you fully understand what it means for your health, we urge you to discuss this note with your provider.    Harley Rodriguez MD  [facility]

## 2024-12-13 ENCOUNTER — OFFICE VISIT (OUTPATIENT)
Dept: FAMILY MEDICINE CLINIC | Facility: CLINIC | Age: 39
End: 2024-12-13
Payer: COMMERCIAL

## 2024-12-13 VITALS
HEIGHT: 66 IN | WEIGHT: 196.4 LBS | BODY MASS INDEX: 31.57 KG/M2 | DIASTOLIC BLOOD PRESSURE: 79 MMHG | OXYGEN SATURATION: 98 % | HEART RATE: 88 BPM | RESPIRATION RATE: 18 BRPM | TEMPERATURE: 98.2 F | SYSTOLIC BLOOD PRESSURE: 124 MMHG

## 2024-12-13 DIAGNOSIS — Z00.01 ENCOUNTER FOR GENERAL ADULT MEDICAL EXAMINATION WITH ABNORMAL FINDINGS: Primary | ICD-10-CM

## 2024-12-13 DIAGNOSIS — D50.8 OTHER IRON DEFICIENCY ANEMIA: ICD-10-CM

## 2024-12-13 DIAGNOSIS — E55.9 VITAMIN D DEFICIENCY: ICD-10-CM

## 2024-12-13 DIAGNOSIS — Z90.3 HISTORY OF SLEEVE GASTRECTOMY: ICD-10-CM

## 2024-12-13 DIAGNOSIS — Z96.642 HISTORY OF LEFT HIP REPLACEMENT: ICD-10-CM

## 2024-12-13 DIAGNOSIS — R42 DIZZY SPELLS: ICD-10-CM

## 2024-12-13 DIAGNOSIS — E66.9 MILD OBESITY: ICD-10-CM

## 2024-12-13 DIAGNOSIS — R11.0 NAUSEA: ICD-10-CM

## 2024-12-13 DIAGNOSIS — E28.2 POLYCYSTIC OVARIES: ICD-10-CM

## 2024-12-13 DIAGNOSIS — J30.1 SEASONAL ALLERGIC RHINITIS DUE TO POLLEN: ICD-10-CM

## 2024-12-13 DIAGNOSIS — G43.109 MIGRAINE WITH AURA AND WITHOUT STATUS MIGRAINOSUS, NOT INTRACTABLE: ICD-10-CM

## 2024-12-13 DIAGNOSIS — Z12.31 ENCOUNTER FOR SCREENING MAMMOGRAM FOR MALIGNANT NEOPLASM OF BREAST: ICD-10-CM

## 2024-12-13 DIAGNOSIS — F33.42 RECURRENT MAJOR DEPRESSIVE DISORDER, IN FULL REMISSION: ICD-10-CM

## 2024-12-13 RX ORDER — TOPIRAMATE 50 MG/1
TABLET, FILM COATED ORAL
Qty: 30 TABLET | Refills: 1 | Status: SHIPPED | OUTPATIENT
Start: 2024-12-13

## 2024-12-13 RX ORDER — ONDANSETRON 4 MG/1
4 TABLET, ORALLY DISINTEGRATING ORAL EVERY 8 HOURS PRN
Qty: 6 TABLET | Refills: 0 | Status: SHIPPED | OUTPATIENT
Start: 2024-12-13

## 2024-12-13 RX ORDER — LORATADINE 10 MG/1
10 TABLET ORAL DAILY
Qty: 90 TABLET | Refills: 3 | Status: SHIPPED | OUTPATIENT
Start: 2024-12-13

## 2024-12-13 NOTE — ASSESSMENT & PLAN NOTE
Patient is status post sleeve gastrectomy 8/2020, having lost per patient account well over 100 pounds, but had been slowly regaining weight, approximately now increased to 40 pounds from her weight loss oneal, prompting initiation of Zepbound 2.5 mg subcu weekly 1 month ago.  She has lost 10 pounds of weight on this medication with some mild transient nausea and slight loose stools, not deemed to be a major problem.  Will titrate Zepbound up to 5 mg subcu weekly, monitoring for any significant progression side effects, emphasized need for healthy diet and regular physical activity.  Recheck in 1 month for clinical response.

## 2024-12-13 NOTE — ASSESSMENT & PLAN NOTE
Regular but heavy menses status post delivery of pregnancy 4 months ago.  She will follow-up with gynecology to discuss options including reinsertion of Mirena.

## 2024-12-13 NOTE — ASSESSMENT & PLAN NOTE
Mild nausea status post each dosing of her Zepbound.  Prescribe Zofran to be used as needed.  Advised of any significant worsening of symptoms.

## 2024-12-13 NOTE — PROGRESS NOTES
Female Physical Note      Date: 2024   Patient Name: Ely De La Garza  : 1985   MRN: [id number]     Chief Complaint:    Chief Complaint   Patient presents with    Annual Exam       History of Present Illness: Ely De La Garza is a 39 y.o. female who is here today for their annual health maintenance and physical.  Patient also is here to follow-up following initiation 1 month ago of Zepbound 2.5 mg daily to address her obesity, after having some recurrent weight gain post sleeve gastrectomy from 2020.  She notes some mild nausea after each dose of the Zepbound and also having some mild diarrheal stools but not consistently.  No abdominal pain.  Overall pleased with her progress having lost 10 pounds of weight in the last month.  She also has a history of migraine headaches, previously having been on Topamax prior to a recent pregnancy having delivered 4 months ago.  She still has migraine headaches on a weekly basis that are quite severe, including photophobia nausea and vomiting.  Interested in restarting her Topamax.  1 month ago also had a change in Prevacid over to pantoprazole 40 mg daily noting she has had essential resolution of her dyspepsia/GERD.  Has anxiety and depression for which she takes Paxil and Vraylar followed by psychiatry doing well.  Constipation having been on Linzess resolved, in fact is noted having some looser stools on the Zepbound.  Polycystic ovary syndrome menses, previously having had good control with Mirena, to follow-up with gynecology post delivery.  She also had a history of anemia secondary to menometrorrhagia, most recent hematocrit 11.3 in 2024, versus 8.4 in 2024.  She had a normal ferritin a month ago.  Patient also has had occasional positional dizzy episodes, having had a full cardiac workup per Felecia PINEDA, including normal Holter monitor in 2023, nuclear stress testing normal in 10/2023 and echocardiogram 10/2023 with an EF of 66%.  The  dizziness is not a major issue at this time.  Occasional numbness in her right hand when she keeps her wrist flexed for an extended period of time such as when she holds her infant.  Rarely has some allergy symptoms for which she has not taken any medication but interested in targeted treatment.  Post left 8/2023 secondary to avascular necrosis, only having occasional discomfort, no other musculoskeletal complaints.  No other acute concerns.  Review of systems otherwise unremarkable.  Health maintenance includes last Pap smear from 4/2022 negative, to follow-up with gynecology, mammogram from 6/2023 due for repeat study, declines COVID-19 vaccine recommendation, update screening labs.      Subjective      Review of Systems:   Review of Systems    Past Medical History, Social History, Family History and Care Team were all reviewed with patient and updated as appropriate.     Medications:     Current Outpatient Medications:     pantoprazole (Protonix) 40 MG EC tablet, Take 1 tablet by mouth Daily., Disp: 90 tablet, Rfl: 3    PARoxetine (PAXIL) 20 MG tablet, 1 tablet., Disp: , Rfl:     SUMAtriptan (Imitrex) 100 MG tablet, Take one tablet at onset of headache. May repeat dose one time in 2 hours if headache not relieved., Disp: 9 tablet, Rfl: 5    Vraylar 1.5 MG capsule capsule, 1 capsule., Disp: , Rfl:     loratadine (Claritin) 10 MG tablet, Take 1 tablet by mouth Daily., Disp: 90 tablet, Rfl: 3    ondansetron ODT (ZOFRAN-ODT) 4 MG disintegrating tablet, Place 1 tablet on the tongue Every 8 (Eight) Hours As Needed for Vomiting or Nausea., Disp: 6 tablet, Rfl: 0    Tirzepatide-Weight Management (ZEPBOUND) 5 MG/0.5ML solution auto-injector, Inject 0.5 mL under the skin into the appropriate area as directed 1 (One) Time Per Week., Disp: 2 mL, Rfl: 0    topiramate (Topamax) 50 MG tablet, 0.5 tablets at bedtime, then increase to 1 tablet at bedtime after 2 weeks, Disp: 30 tablet, Rfl: 1    Allergies:   No Known  Allergies    Immunizations:  Health Maintenance Summary            Ordered - LIPID PANEL (Yearly) Ordered on 12/13/2024 06/02/2023  Lipid Panel    05/01/2023  Postponed until 11/1/2023 by Kristine Yang (Pending event)    04/12/2023  Postponed until 4/12/2023 by Linette Toro MA (Pending event)    10/25/2021  Done    10/25/2021  Done    Only the first 5 history entries have been loaded, but more history exists.              Postponed - COVID-19 Vaccine (3 - 2024-25 season) Postponed until 12/31/2024      11/15/2024  Postponed until 12/31/2024 by Kristine Yang (Patient Refused - REFUSED)    12/19/2023  Postponed until 12/21/2023 by Kristine Yang (Patient Refused - patient refused today)    04/12/2023  Postponed until 5/22/2023 by Linette Toro MA (Pending event)    07/01/2021  Imm Admin: COVID-19 (PFIZER) Purple Cap Monovalent    06/10/2021  Imm Admin: COVID-19 (PFIZER) Purple Cap Monovalent    Only the first 5 history entries have been loaded, but more history exists.              PAP SMEAR (Every 3 Years) Next due on 4/29/2025 04/29/2022  Pap IG, Rfx HPV ASCU    04/22/2021  Pap IG, HPV-hr    11/20/2019  Done - Negative              Ordered - MAMMOGRAM (Every 2 Years) Ordered on 12/13/2024 06/28/2023  SCANNED - MAMMO    06/19/2023  Mammo Screening Bilateral With CAD    04/26/2022   Mammogram component of  MAMMOGRAPHY    04/26/2022   MAMMOGRAPHY              ANNUAL PHYSICAL (Yearly) Next due on 12/13/2025 12/13/2024  Done    06/08/2023  Registry Metric: Last Annual Physical    04/12/2023  Postponed until 5/8/2023 by Linette Toro MA (Pending event)              BMI FOLLOWUP (Yearly) Next due on 12/13/2025 12/13/2024  Registry Metric: BMI Follow-up    11/15/2024  Smart[employer]: WORKFLOW - QUALITY MEASUREMENT - DOCUMENTED WEIGHT FOLLOW-UP PLAN    06/02/2023  SmartData: WORKFLOW - QUALITY MEASUREMENT - DOCUMENTED WEIGHT FOLLOW-UP PLAN    10/27/2022  SmartData: WORKFLOW -  QUALITY MEASUREMENT - BMI FOLLOW UP CARE PLAN DOCUMENTED    10/27/2022  SmartData: WORKFLOW - QUALITY MEASUREMENT - DOCUMENTED WEIGHT FOLLOW-UP PLAN    Only the first 5 history entries have been loaded, but more history exists.              TDAP/TD VACCINES (3 - Td or Tdap) Next due on 6/25/2034 06/25/2024  Imm Admin: Tdap    06/23/2022  Imm Admin: Tdap              HEPATITIS C SCREENING  Completed      02/07/2024  Hep C Virus Ab component of Obstetric Panel    05/01/2023  Postponed until 11/1/2023 by Kristine Yang (Pending event)    04/12/2023  Postponed until 4/12/2023 by Linette Toro MA (Pending event)              INFLUENZA VACCINE  Completed      11/15/2024  Imm Admin: Fluzone  >6mos    12/19/2023  Postponed until 3/31/2024 by Kristine Yang (Patient Refused - patient refused right now)    10/27/2022  Imm Admin: Fluzone Quad >6mos (Multi-dose)    10/27/2022  Imm Admin: Influenza, Unspecified    10/01/2020  Imm Admin: Influenza TIV (IM)    Only the first 5 history entries have been loaded, but more history exists.              Pneumococcal Vaccine 0-64 (Series Information) Aged Out      No completion, postpone, or frequency change history exists for this topic.                     No orders of the defined types were placed in this encounter.       Colorectal Screening:   N/A based upon age and low risk  Last Completed Colonoscopy       This patient has no relevant Health Maintenance data.          Pap: Up-to-date from 4/2022  Last Completed Pap Smear            PAP SMEAR (Every 3 Years) Next due on 4/29/2025 04/29/2022  Pap IG, Rfx HPV ASCU    04/22/2021  Pap IG, HPV-hr    11/20/2019  Done - Negative                   Mammogram: Referral pending  Last Completed Mammogram            Ordered - MAMMOGRAM (Every 2 Years) Ordered on 12/13/2024 06/28/2023  SCANNED - MAMMO    06/19/2023  Mammo Screening Bilateral With CAD    04/26/2022   Mammogram component of  MAMMOGRAPHY    04/26/2022   " MAMMOGRAPHY                     CT for Smoker (Age 50-80, 20 pk yr):   N/A  Bone Density/DEXA (Age 65 or high risk): N/A  Hep C (Age 18-79 once): Previously negative  HIV (Age 15-65 once): No results found for: \"HIV1X2\"  A1c:   Hemoglobin A1C   Date Value Ref Range Status   02/07/2024 5.1 4.8 - 5.6 % Final     Comment:              Prediabetes: 5.7 - 6.4           Diabetes: >6.4           Glycemic control for adults with diabetes: <7.0        Lipid panel:  No results found for: \"LIPIDEXCLUSI\"    The 10-year ASCVD risk score (Shawn TRUJILLO, et al., 2019) is: 0.5%    Values used to calculate the score:      Age: 39 years      Sex: Female      Is Non- : No      Diabetic: No      Tobacco smoker: No      Systolic Blood Pressure: 124 mmHg      Is BP treated: No      HDL Cholesterol: 52 mg/dL      Total Cholesterol: 177 mg/dL    Dermatology: N/A  Ophthalmologist: Regular checkups  Dentist: Regular checkups    Tobacco Use: Medium Risk (12/13/2024)    Patient History     Smoking Tobacco Use: Former     Smokeless Tobacco Use: Never     Passive Exposure: Never       Social History     Substance and Sexual Activity   Alcohol Use No        Social History     Substance and Sexual Activity   Drug Use Never        Diet/Physical activity: Healthy diet, fair physical activity    Sexual Health: Not currently utilizing contraception, not attempting pregnancy   Menopause: No  Menstrual Cycles: Yes, last menstrual cycle: Monthly    Depression: PHQ-2 Depression Screening  PHQ-9 Total Score: 0      Objective     Physical Exam:  Vital Signs:   Vitals:    12/13/24 0824   BP: 124/79   BP Location: Left arm   Patient Position: Sitting   Cuff Size: Adult   Pulse: 88   Resp: 18   Temp: 98.2 °F (36.8 °C)   TempSrc: Temporal   SpO2: 98%   Weight: 89.1 kg (196 lb 6.4 oz)   Height: 167.6 cm (66\")   PainSc: 0-No pain     Facility age limit for growth %rob is 20 years.  Body mass index is 31.7 kg/m².     Physical Exam  Vitals " and nursing note reviewed.   Constitutional:       General: She is not in acute distress.     Appearance: Normal appearance. She is obese. She is not ill-appearing.      Comments: Pleasant healthy alert and oriented, NAD, BMI 31.7 reflecting a 10 pound weight loss in the last month   HENT:      Head: Normocephalic and atraumatic.      Right Ear: Tympanic membrane, ear canal and external ear normal.      Left Ear: Tympanic membrane, ear canal and external ear normal.      Nose: Nose normal. No congestion or rhinorrhea.      Mouth/Throat:      Mouth: Mucous membranes are moist.      Pharynx: Oropharynx is clear.      Comments: Good dentition  Eyes:      Extraocular Movements: Extraocular movements intact.      Conjunctiva/sclera: Conjunctivae normal.      Pupils: Pupils are equal, round, and reactive to light.   Neck:      Vascular: No carotid bruit.      Comments: No periclavicular or axillary or inguinal adenopathy  Cardiovascular:      Rate and Rhythm: Normal rate and regular rhythm.      Pulses: Normal pulses.      Heart sounds: Normal heart sounds. No murmur heard.     No friction rub. No gallop.      Comments: 2+ carotids without bruits, 2+ radial pulses, 2+ femoral pulses without bruits, 2+ bipedal pulses with good perfusion and no dependent edema  Pulmonary:      Effort: Pulmonary effort is normal. No respiratory distress.      Breath sounds: Normal breath sounds.      Comments: No cough  Abdominal:      General: Bowel sounds are normal. There is no distension.      Palpations: Abdomen is soft. There is no mass.      Tenderness: There is no abdominal tenderness. There is no guarding or rebound.      Hernia: No hernia is present.      Comments: Nontender nondistended with no organomegaly or masses   Genitourinary:     Comments: Breast and  exam deferred today as routinely followed by GYN with no acute related concern  Musculoskeletal:         General: No swelling, tenderness, deformity or signs of injury.  Normal range of motion.      Cervical back: Normal range of motion and neck supple. No rigidity or tenderness.      Right lower leg: No edema.      Left lower leg: No edema.      Comments: No pain of her left hip with range of motion testing being intact status post left COSME.   Lymphadenopathy:      Cervical: No cervical adenopathy.      Upper Body:      Right upper body: No supraclavicular or axillary adenopathy.      Left upper body: No supraclavicular or axillary adenopathy.   Skin:     General: Skin is warm and dry.      Capillary Refill: Capillary refill takes less than 2 seconds.      Findings: No lesion or rash.   Neurological:      General: No focal deficit present.      Mental Status: She is alert and oriented to person, place, and time. Mental status is at baseline.      Cranial Nerves: No cranial nerve deficit.      Sensory: No sensory deficit.      Motor: No weakness.      Coordination: Coordination normal.      Gait: Gait normal.   Psychiatric:         Mood and Affect: Mood normal.         Behavior: Behavior normal.         Thought Content: Thought content normal.         Judgment: Judgment normal.         POCT Results (if applicable);   Results for orders placed or performed in visit on 11/15/24   POC Hemoglobin    Collection Time: 11/15/24  9:21 AM    Specimen: Blood   Result Value Ref Range    Hemoglobin 11.3 (A) 12.0 - 17.0 g/dL    Lot Number [id number]     Expiration Date 04/21/2025         Procedures    Assessment / Plan      Assessment/Plan:   Diagnoses and all orders for this visit:    1. Encounter for general adult medical examination with abnormal findings (Primary)  Assessment & Plan:  39-year-old female presenting for complete physical, specific health issues being addressed as detailed below, health maintenance includes mammogram referral pending, Pap smear from 4/2022 normal, patient to follow-up with gynecology, declines COVID-19 vaccine today despite discussion regarding safety and  efficacy, update screening labs    Orders:  -     TSH Rfx On Abnormal To Free T4; Future  -     CBC & Differential; Future  -     Comprehensive Metabolic Panel; Future  -     Lipid Panel; Future  -     Hemoglobin A1c; Future  -     Vitamin D,25-Hydroxy; Future  -     Urinalysis With Culture If Indicated -; Future  -     Vitamin B12; Future  -     Folate; Future  -     Iron Profile; Future  -     Ferritin; Future  -     Vitamin B1, Whole Blood; Future    2. History of sleeve gastrectomy  Assessment & Plan:  Patient is status post sleeve gastrectomy 8/2020, having lost per patient account well over 100 pounds, but had been slowly regaining weight, approximately now increased to 40 pounds from her weight loss oneal, prompting initiation of Zepbound 2.5 mg subcu weekly 1 month ago.  She has lost 10 pounds of weight on this medication with some mild transient nausea and slight loose stools, not deemed to be a major problem.  Will titrate Zepbound up to 5 mg subcu weekly, monitoring for any significant progression side effects, emphasized need for healthy diet and regular physical activity.  Recheck in 1 month for clinical response.    Orders:  -     Vitamin D,25-Hydroxy; Future  -     Vitamin B12; Future  -     Folate; Future  -     Iron Profile; Future  -     Ferritin; Future  -     Vitamin B1, Whole Blood; Future    3. Mild obesity  Assessment & Plan:  Patient is status post sleeve gastrectomy 8/2020, having lost per patient account well over 100 pounds, but had been slowly regaining weight, approximately now increased to 40 pounds from her weight loss oneal, prompting initiation of Zepbound 2.5 mg subcu weekly 1 month ago.  She has lost 10 pounds of weight on this medication with some mild transient nausea and slight loose stools, not deemed to be a major problem.  Will titrate Zepbound up to 5 mg subcu weekly, monitoring for any significant progression side effects, emphasized need for healthy diet and regular  physical activity.  Recheck in 1 month for clinical response.    Orders:  -     Tirzepatide-Weight Management (ZEPBOUND) 5 MG/0.5ML solution auto-injector; Inject 0.5 mL under the skin into the appropriate area as directed 1 (One) Time Per Week.  Dispense: 2 mL; Refill: 0    4. Nausea  Assessment & Plan:  Mild nausea status post each dosing of her Zepbound.  Prescribe Zofran to be used as needed.  Advised of any significant worsening of symptoms.    Orders:  -     ondansetron ODT (ZOFRAN-ODT) 4 MG disintegrating tablet; Place 1 tablet on the tongue Every 8 (Eight) Hours As Needed for Vomiting or Nausea.  Dispense: 6 tablet; Refill: 0    5. Dizzy spells  Assessment & Plan:  History of dizzy spells status post full cardiac workup having had a normal Holter monitor in 9/2023, echocardiogram with EF 66% and otherwise normal 10/2023 and a normal nuclear stress test in 10/2023.  She notes not having major issues at this time with only occasional positional lightheadedness.  We discussed need for hydration as well as avoidance of abrupt positional change.  Will also recheck her hemoglobin as noted given history of anemia which can contribute to her dizziness.      6. Vitamin D deficiency  -     Vitamin D,25-Hydroxy; Future    7. Polycystic ovaries  Assessment & Plan:  Regular but heavy menses status post delivery of pregnancy 4 months ago.  She will follow-up with gynecology to discuss options including reinsertion of Mirena.      8. Migraine with aura and without status migrainosus, not intractable  Assessment & Plan:  Longstanding history of migraines, prior good response to Topamax suppressive therapy which was discontinued during her recent pregnancy.  She is having 1 migraine weekly but notes has become quite severe with photo and phonophobia along with nausea and vomiting.  Currently, we will reinitiate Topamax 50 mg tablets, initially at 0.5 tablets nightly x 2 weeks, then increase to 1 tablet nightly if migraine  headaches are persisting.  We did discuss side effect profile of Topamax, of which she had tolerated previously with no major issues.  Reassess in 1 month.    Orders:  -     topiramate (Topamax) 50 MG tablet; 0.5 tablets at bedtime, then increase to 1 tablet at bedtime after 2 weeks  Dispense: 30 tablet; Refill: 1    9. Recurrent major depressive disorder, in full remission  Assessment & Plan:  Relates that she is doing well taking Paxil 20 mg daily and Vraylar 1.5 mg daily as prescribed by psychiatry.      10. Other iron deficiency anemia  Assessment & Plan:  History of anemia felt to be secondary significant menstrual bleeding.  Still having monthly menses, though described is quite heavy.  Hemoglobin 1 month ago improved to 11.3 versus prior value of 10.9 in 10/2024 and 8.4 in 8/2024.  She did have a normal ferritin per testing in 10/2024.  Will repeat CBC along with iron studies B12 and folic acid.  She is to follow-up with gynecology and discuss treatment options regarding her menorrhagia including reinsertion of her Mirena.    Orders:  -     CBC & Differential; Future  -     Vitamin B12; Future  -     Folate; Future  -     Iron Profile; Future  -     Ferritin; Future  -     Vitamin B1, Whole Blood; Future    11. Seasonal allergic rhinitis due to pollen  Assessment & Plan:  Initiate loratadine 10 mg daily as needed.    Orders:  -     loratadine (Claritin) 10 MG tablet; Take 1 tablet by mouth Daily.  Dispense: 90 tablet; Refill: 3    12. History of left hip replacement  Assessment & Plan:  Status post left COSME in 8/2023 secondary to AVN, clinically doing well, following up with Dr. Bridger Mancera of orthopedic surgery.      13. Encounter for screening mammogram for malignant neoplasm of breast  Assessment & Plan:  Most recent mammogram in 6/2023.  Update mammogram.    Orders:  -     Mammo Screening Digital Tomosynthesis Bilateral With CAD; Future         Healthcare Maintenance:  Counseling provided based on age  appropriate USPSTF guidelines.       Ely Silvae Frederic voices understanding and acceptance of this advice and will call back with any further questions or concerns. AVS with preventive healthcare tips printed for patient.     Follow Up:   Return in about 1 month (around 1/13/2025) for Recheck.      At [facility], we believe that sharing information builds trust and better relationships. You are receiving this note because you recently visited [facility]. It is possible you will see health information before a provider has talked with you about it. This kind of information can be easy to misunderstand. To help you fully understand what it means for your health, we urge you to discuss this note with your provider.    Harley Rodriguez MD  [facility]

## 2024-12-13 NOTE — ASSESSMENT & PLAN NOTE
Relates that she is doing well taking Paxil 20 mg daily and Vraylar 1.5 mg daily as prescribed by psychiatry.

## 2024-12-13 NOTE — ASSESSMENT & PLAN NOTE
Status post left COSME in 8/2023 secondary to AVN, clinically doing well, following up with Dr. Bridger Mancera of orthopedic surgery.

## 2024-12-13 NOTE — ASSESSMENT & PLAN NOTE
Longstanding history of migraines, prior good response to Topamax suppressive therapy which was discontinued during her recent pregnancy.  She is having 1 migraine weekly but notes has become quite severe with photo and phonophobia along with nausea and vomiting.  Currently, we will reinitiate Topamax 50 mg tablets, initially at 0.5 tablets nightly x 2 weeks, then increase to 1 tablet nightly if migraine headaches are persisting.  We did discuss side effect profile of Topamax, of which she had tolerated previously with no major issues.  Reassess in 1 month.

## 2024-12-13 NOTE — ASSESSMENT & PLAN NOTE
History of anemia felt to be secondary significant menstrual bleeding.  Still having monthly menses, though described is quite heavy.  Hemoglobin 1 month ago improved to 11.3 versus prior value of 10.9 in 10/2024 and 8.4 in 8/2024.  She did have a normal ferritin per testing in 10/2024.  Will repeat CBC along with iron studies B12 and folic acid.  She is to follow-up with gynecology and discuss treatment options regarding her menorrhagia including reinsertion of her Mirena.

## 2024-12-13 NOTE — PROGRESS NOTES
..  Venipuncture Blood Specimen Collection  Venipuncture performed in right arm by Kristine Yang with good hemostasis. Patient tolerated the procedure well without complications.   12/13/24   Kristine Yang

## 2024-12-13 NOTE — ASSESSMENT & PLAN NOTE
History of dizzy spells status post full cardiac workup having had a normal Holter monitor in 9/2023, echocardiogram with EF 66% and otherwise normal 10/2023 and a normal nuclear stress test in 10/2023.  She notes not having major issues at this time with only occasional positional lightheadedness.  We discussed need for hydration as well as avoidance of abrupt positional change.  Will also recheck her hemoglobin as noted given history of anemia which can contribute to her dizziness.

## 2024-12-13 NOTE — ASSESSMENT & PLAN NOTE
39-year-old female presenting for complete physical, specific health issues being addressed as detailed below, health maintenance includes mammogram referral pending, Pap smear from 4/2022 normal, patient to follow-up with gynecology, declines COVID-19 vaccine today despite discussion regarding safety and efficacy, update screening labs   informed

## 2024-12-14 LAB
25(OH)D3+25(OH)D2 SERPL-MCNC: 25 NG/ML (ref 30–100)
ALBUMIN SERPL-MCNC: 4.4 G/DL (ref 3.9–4.9)
ALP SERPL-CCNC: 59 IU/L (ref 44–121)
ALT SERPL-CCNC: 9 IU/L (ref 0–32)
APPEARANCE UR: CLEAR
AST SERPL-CCNC: 12 IU/L (ref 0–40)
BACTERIA #/AREA URNS HPF: NORMAL /[HPF]
BASOPHILS # BLD AUTO: 0 X10E3/UL (ref 0–0.2)
BASOPHILS NFR BLD AUTO: 1 %
BILIRUB SERPL-MCNC: 0.5 MG/DL (ref 0–1.2)
BILIRUB UR QL STRIP: NEGATIVE
BUN SERPL-MCNC: 9 MG/DL (ref 6–24)
BUN/CREAT SERPL: 10 (ref 9–23)
CALCIUM SERPL-MCNC: 9.3 MG/DL (ref 8.7–10.2)
CASTS URNS QL MICRO: NORMAL /LPF
CHLORIDE SERPL-SCNC: 104 MMOL/L (ref 96–106)
CHOLEST SERPL-MCNC: 178 MG/DL (ref 100–199)
CO2 SERPL-SCNC: 23 MMOL/L (ref 20–29)
COLOR UR: YELLOW
CREAT SERPL-MCNC: 0.91 MG/DL (ref 0.57–1)
EGFRCR SERPLBLD CKD-EPI 2021: 82 ML/MIN/1.73
EOSINOPHIL # BLD AUTO: 0.1 X10E3/UL (ref 0–0.4)
EOSINOPHIL NFR BLD AUTO: 2 %
EPI CELLS #/AREA URNS HPF: NORMAL /HPF (ref 0–10)
ERYTHROCYTE [DISTWIDTH] IN BLOOD BY AUTOMATED COUNT: 15.8 % (ref 11.7–15.4)
FERRITIN SERPL-MCNC: 9 NG/ML (ref 15–150)
FOLATE SERPL-MCNC: 11.6 NG/ML
GLOBULIN SER CALC-MCNC: 2.3 G/DL (ref 1.5–4.5)
GLUCOSE SERPL-MCNC: 109 MG/DL (ref 70–99)
GLUCOSE UR QL STRIP: NEGATIVE
HBA1C MFR BLD: 5.6 % (ref 4.8–5.6)
HCT VFR BLD AUTO: 35.7 % (ref 34–46.6)
HDLC SERPL-MCNC: 49 MG/DL
HGB BLD-MCNC: 10.9 G/DL (ref 11.1–15.9)
HGB UR QL STRIP: NEGATIVE
IMM GRANULOCYTES # BLD AUTO: 0 X10E3/UL (ref 0–0.1)
IMM GRANULOCYTES NFR BLD AUTO: 1 %
IRON SATN MFR SERPL: 5 % (ref 15–55)
IRON SERPL-MCNC: 24 UG/DL (ref 27–159)
KETONES UR QL STRIP: NEGATIVE
LDLC SERPL CALC-MCNC: 105 MG/DL (ref 0–99)
LEUKOCYTE ESTERASE UR QL STRIP: NEGATIVE
LYMPHOCYTES # BLD AUTO: 0.8 X10E3/UL (ref 0.7–3.1)
LYMPHOCYTES NFR BLD AUTO: 21 %
MCH RBC QN AUTO: 23.3 PG (ref 26.6–33)
MCHC RBC AUTO-ENTMCNC: 30.5 G/DL (ref 31.5–35.7)
MCV RBC AUTO: 76 FL (ref 79–97)
MICRO URNS: NORMAL
MICRO URNS: NORMAL
MONOCYTES # BLD AUTO: 0.3 X10E3/UL (ref 0.1–0.9)
MONOCYTES NFR BLD AUTO: 7 %
NEUTROPHILS # BLD AUTO: 2.6 X10E3/UL (ref 1.4–7)
NEUTROPHILS NFR BLD AUTO: 68 %
NITRITE UR QL STRIP: NEGATIVE
PH UR STRIP: 6 [PH] (ref 5–7.5)
PLATELET # BLD AUTO: 270 X10E3/UL (ref 150–450)
POTASSIUM SERPL-SCNC: 4.2 MMOL/L (ref 3.5–5.2)
PROT SERPL-MCNC: 6.7 G/DL (ref 6–8.5)
PROT UR QL STRIP: NEGATIVE
RBC # BLD AUTO: 4.67 X10E6/UL (ref 3.77–5.28)
RBC #/AREA URNS HPF: NORMAL /HPF (ref 0–2)
SODIUM SERPL-SCNC: 140 MMOL/L (ref 134–144)
SP GR UR STRIP: 1.01 (ref 1–1.03)
TIBC SERPL-MCNC: 444 UG/DL (ref 250–450)
TRIGL SERPL-MCNC: 136 MG/DL (ref 0–149)
TSH SERPL DL<=0.005 MIU/L-ACNC: 1.6 UIU/ML (ref 0.45–4.5)
UIBC SERPL-MCNC: 420 UG/DL (ref 131–425)
URINALYSIS REFLEX: NORMAL
UROBILINOGEN UR STRIP-MCNC: 0.2 MG/DL (ref 0.2–1)
VIT B12 SERPL-MCNC: 336 PG/ML (ref 232–1245)
VLDLC SERPL CALC-MCNC: 24 MG/DL (ref 5–40)
WBC # BLD AUTO: 3.8 X10E3/UL (ref 3.4–10.8)
WBC #/AREA URNS HPF: NORMAL /HPF (ref 0–5)

## 2024-12-16 ENCOUNTER — TELEPHONE (OUTPATIENT)
Dept: FAMILY MEDICINE CLINIC | Facility: CLINIC | Age: 39
End: 2024-12-16
Payer: COMMERCIAL

## 2024-12-16 DIAGNOSIS — E55.9 VITAMIN D DEFICIENCY: Primary | ICD-10-CM

## 2024-12-16 DIAGNOSIS — D50.0 IRON DEFICIENCY ANEMIA DUE TO CHRONIC BLOOD LOSS: ICD-10-CM

## 2024-12-16 RX ORDER — FERROUS SULFATE 325(65) MG
325 TABLET ORAL
Qty: 90 TABLET | Refills: 1 | Status: SHIPPED | OUTPATIENT
Start: 2024-12-16

## 2024-12-16 RX ORDER — CHOLECALCIFEROL (VITAMIN D3) 50 MCG
2000 TABLET ORAL DAILY
Qty: 90 TABLET | Refills: 3 | Status: SHIPPED | OUTPATIENT
Start: 2024-12-16

## 2024-12-17 NOTE — TELEPHONE ENCOUNTER
Phone conversation with patient regarding results of lab testing from 12/13/2024 as follows:    Urinalysis normal  Ferritin Low at 9, TIBC high normal at 444 with iron percent saturation low at 5%,  Hemoglobin low at 10.9, unchanged over the last couple months, up from 8.44 months prior, white count 3800, platelets 270,000, folic acid B12 normal, TSH and hemoglobin A1c normal, lipid profile with total cholesterol 178, triglyceride 136, HDL 49,   B1 pending    Assessment/plan:  1.  Iron deficiency anemia.  Patient was having significantly heavy menses, now improved with Mirena.  Start iron 325 mg plus vitamin C once daily, and will repeat iron studies with CBC when she comes in for follow-up visit in 1 month.  2.  Mild vitamin D deficiency.  Start vitamin D 2000 IU once daily.  3.  Very minimal elevation of LDL.  Emphasize healthy lifestyle with diet and exercise.  Repeat lipids in 1 year.  4.  B1 pending.  Will notify patient for any abnormality once finalized.  5.  Follow-up in 1 month plan to repeat CBC and iron studies at that time.  Advised of any concerns in the interim.

## 2024-12-18 LAB — VIT B1 BLD-SCNC: 90.5 NMOL/L (ref 66.5–200)

## 2025-01-16 ENCOUNTER — OFFICE VISIT (OUTPATIENT)
Dept: FAMILY MEDICINE CLINIC | Facility: CLINIC | Age: 40
End: 2025-01-16
Payer: COMMERCIAL

## 2025-01-16 VITALS
WEIGHT: 184 LBS | OXYGEN SATURATION: 98 % | DIASTOLIC BLOOD PRESSURE: 74 MMHG | BODY MASS INDEX: 29.57 KG/M2 | HEIGHT: 66 IN | HEART RATE: 72 BPM | SYSTOLIC BLOOD PRESSURE: 118 MMHG | TEMPERATURE: 97.6 F | RESPIRATION RATE: 16 BRPM

## 2025-01-16 DIAGNOSIS — N92.0 MENORRHAGIA WITH REGULAR CYCLE: ICD-10-CM

## 2025-01-16 DIAGNOSIS — Z90.3 HISTORY OF SLEEVE GASTRECTOMY: ICD-10-CM

## 2025-01-16 DIAGNOSIS — Z12.31 ENCOUNTER FOR SCREENING MAMMOGRAM FOR MALIGNANT NEOPLASM OF BREAST: ICD-10-CM

## 2025-01-16 DIAGNOSIS — E66.3 OVERWEIGHT (BMI 25.0-29.9): Primary | ICD-10-CM

## 2025-01-16 DIAGNOSIS — Z28.82 PARENT REFUSES IMMUNIZATIONS: ICD-10-CM

## 2025-01-16 DIAGNOSIS — D50.8 OTHER IRON DEFICIENCY ANEMIA: ICD-10-CM

## 2025-01-16 DIAGNOSIS — R11.0 NAUSEA: ICD-10-CM

## 2025-01-16 PROCEDURE — 99214 OFFICE O/P EST MOD 30 MIN: CPT | Performed by: INTERNAL MEDICINE

## 2025-01-16 RX ORDER — ONDANSETRON 4 MG/1
4 TABLET, ORALLY DISINTEGRATING ORAL EVERY 8 HOURS PRN
Qty: 6 TABLET | Refills: 0 | Status: SHIPPED | OUTPATIENT
Start: 2025-01-16

## 2025-01-16 NOTE — ASSESSMENT & PLAN NOTE
Status post sleeve gastrectomy in 8/2020, having lost per her account well over 100 pounds at that time, subsequently regained 40 pounds of weight.  Started 2 months ago and Zepbound 2.5 mg subcu weekly, increased 1 month ago to 5 mg subcu weekly.  She has had a total of 22 pounds of weight loss in last 2 months including 12 pounds over the last month.  She does have some mild to moderate transient nausea with the Zepbound therapy, and given the side effect along with significant weight loss, will simply continue her current Zepbound 5 mg subcu weekly along with continuation of her healthy diet and exercise.  Follow-up in 1 month for review.

## 2025-01-16 NOTE — ASSESSMENT & PLAN NOTE
Mild to moderate nausea for 1 to 2 days post each dose of Zepbound.  Treat with Zofran, and for now continue her current dose of Zepbound 5 mg subcu weekly rather than titrating up given excellent weight loss on her current regimen.

## 2025-01-16 NOTE — ASSESSMENT & PLAN NOTE
History of menorrhagia with secondary iron deficiency per lab testing 1 month ago.  Started at that time on iron sulfate 325 mg daily.  Will repeat CBC and iron studies.  She has no need for contraception having had recent post child delivery bilateral salpingectomy, but given her menorrhagia would benefit from hormone manipulation.  Discussed today option of oral contraceptives versus her Mirena to control her flow.  She prefers consideration of Mirena and will schedule a visit with her gynecologist to discuss this option.

## 2025-01-16 NOTE — ASSESSMENT & PLAN NOTE
Declines recommended influenza and COVID-19 vaccines despite counseling regarding safety and efficacy.

## 2025-01-16 NOTE — PROGRESS NOTES
Follow Up Office Visit      Date: 2025   Patient Name: Ely De La Garza  : 1985   MRN: [id number]     Chief Complaint:    Chief Complaint   Patient presents with    follow up weight       History of Present Illness: Ely De La Garza is a 39 y.o. female who is here today for 1 month follow-up of her obesity, last month having had an increase in her Zepbound from 2.5 mg up to 5 mg subcu weekly.  She notes good appetite suppression on the Zepbound, with mild to moderate nausea for 1 to 2 days which is well-controlled with Zofran, having some looser stools which is not a problem given she historically has been constipated.  She continues a healthy diet with plenty of vegetables, still limited fruits given GI upset, drinks water, no junk foods and fast foods, walks 7000 steps daily.  She has lost her today's documentation 12 pounds in the last month and 10 pounds in the preceding months for 22 pound weight loss in the last 2 months on this at home, and now approximately 180 pound weight loss since she had her surgery 5 years prior.  She also had lab testing a month ago which revealed an iron deficiency anemia felt secondary to menorrhagia.  She had delivery of her most recent child 4 months ago, with subsequent salpingectomy for contraception.  Prior to her last pregnancy she used Mirena for contraception with good control of menses.  Last month ago she was started on iron supplementation and is due for repeat testing of her iron deficiency today.  She is also due for mammogram screening, last study having been in 2023 with subsequent diagnostic right send a mammogram that was felt to be benign with routine screening follow-up recommended.  She is status post bilateral breast implant surgery approximately 5 years ago.  No other acute problems or concerns at this time.    Subjective      Review of Systems:   Review of Systems    I have reviewed the patients family history, social history, past  "medical history, past surgical history and have updated it as appropriate.     Medications:     Current Outpatient Medications:     Cholecalciferol (Vitamin D) 50 MCG (2000 UT) tablet, Take 1 tablet by mouth Daily., Disp: 90 tablet, Rfl: 3    ferrous sulfate 325 (65 FE) MG tablet, Take 1 tablet by mouth Daily With Breakfast. Take with vitamin C 250-500 mg once daily, Disp: 90 tablet, Rfl: 1    loratadine (Claritin) 10 MG tablet, Take 1 tablet by mouth Daily., Disp: 90 tablet, Rfl: 3    ondansetron ODT (ZOFRAN-ODT) 4 MG disintegrating tablet, Place 1 tablet on the tongue Every 8 (Eight) Hours As Needed for Vomiting or Nausea., Disp: 6 tablet, Rfl: 0    pantoprazole (Protonix) 40 MG EC tablet, Take 1 tablet by mouth Daily., Disp: 90 tablet, Rfl: 3    PARoxetine (PAXIL) 20 MG tablet, 1 tablet., Disp: , Rfl:     SUMAtriptan (Imitrex) 100 MG tablet, Take one tablet at onset of headache. May repeat dose one time in 2 hours if headache not relieved., Disp: 9 tablet, Rfl: 5    Tirzepatide-Weight Management (ZEPBOUND) 5 MG/0.5ML solution auto-injector, Inject 0.5 mL under the skin into the appropriate area as directed 1 (One) Time Per Week., Disp: 2 mL, Rfl: 0    topiramate (Topamax) 50 MG tablet, 0.5 tablets at bedtime, then increase to 1 tablet at bedtime after 2 weeks, Disp: 30 tablet, Rfl: 1    Vraylar 1.5 MG capsule capsule, 1 capsule., Disp: , Rfl:     Allergies:   No Known Allergies    Objective     Physical Exam: Please see above  Vital Signs:   Vitals:    01/16/25 1553   BP: 118/74   BP Location: Left arm   Patient Position: Sitting   Cuff Size: Adult   Pulse: 72   Resp: 16   Temp: 97.6 °F (36.4 °C)   TempSrc: Temporal   SpO2: 98%   Weight: 83.5 kg (184 lb)   Height: 167.6 cm (66\")     Body mass index is 29.7 kg/m².          Physical Exam  Constitutional:       General: She is not in acute distress.     Appearance: Normal appearance. She is not ill-appearing.      Comments: Pleasant healthy 39-year-old female, NAD, " BMI 29.7 reflecting a 22 pound weight loss in the last months including at 12 pound weight loss in the last month   Cardiovascular:      Rate and Rhythm: Normal rate and regular rhythm.      Heart sounds: Normal heart sounds. No murmur heard.     No friction rub. No gallop.   Pulmonary:      Effort: Pulmonary effort is normal. No respiratory distress.      Breath sounds: Normal breath sounds.   Abdominal:      General: Bowel sounds are normal. There is no distension.      Palpations: Abdomen is soft. There is no mass.      Tenderness: There is no abdominal tenderness. There is no guarding or rebound.      Hernia: No hernia is present.   Musculoskeletal:      Cervical back: No rigidity or tenderness.   Lymphadenopathy:      Cervical: No cervical adenopathy.   Skin:     General: Skin is warm and dry.   Neurological:      General: No focal deficit present.      Mental Status: She is alert.   Psychiatric:         Mood and Affect: Mood normal.         Procedures    Results:   Labs:   Hemoglobin A1C   Date Value Ref Range Status   12/13/2024 5.6 4.8 - 5.6 % Final     Comment:              Prediabetes: 5.7 - 6.4           Diabetes: >6.4           Glycemic control for adults with diabetes: <7.0       TSH   Date Value Ref Range Status   12/13/2024 1.600 0.450 - 4.500 uIU/mL Final        POCT Results (if applicable):   Results for orders placed or performed in visit on 12/13/24   Vitamin B1, Whole Blood    Collection Time: 12/13/24 12:00 AM    Specimen: Blood    Blood  Release to adelina   Result Value Ref Range    Vitamin B1, Whole Blood 90.5 66.5 - 200.0 nmol/L   Ferritin    Collection Time: 12/13/24  9:35 AM    Specimen: Arm, Right; Blood    Blood  Release to adelina   Result Value Ref Range    Ferritin 9 (L) 15 - 150 ng/mL   Iron Profile    Collection Time: 12/13/24  9:35 AM    Specimen: Arm, Right; Blood    Blood  Release to adelina   Result Value Ref Range    TIBC 444 250 - 450 ug/dL    UIBC 420 131 - 425 ug/dL    Iron 24 (L) 27  - 159 ug/dL    Iron Saturation 5 (L) 15 - 55 %   Folate    Collection Time: 12/13/24  9:35 AM    Specimen: Arm, Right; Blood    Blood  Release to adelina   Result Value Ref Range    Folate 11.6 >3.0 ng/mL   Vitamin B12    Collection Time: 12/13/24  9:35 AM    Specimen: Arm, Right; Blood    Blood  Release to adelina   Result Value Ref Range    Vitamin B-12 336 232 - 1,245 pg/mL   Urinalysis With Culture If Indicated - Urine, Clean Catch    Collection Time: 12/13/24  9:35 AM    Specimen: Urine, Clean Catch    Urine  Release to adelina   Result Value Ref Range    Specific Gravity, UA 1.007 1.005 - 1.030    pH, UA 6.0 5.0 - 7.5    Color, UA Yellow Yellow    Appearance, UA Clear Clear    Leukocytes, UA Negative Negative    Protein Negative Negative/Trace    Glucose, UA Negative Negative    Ketones Negative Negative    Blood, UA Negative Negative    Bilirubin, UA Negative Negative    Urobilinogen, UA 0.2 0.2 - 1.0 mg/dL    Nitrite, UA Negative Negative    Microscopic Examination Comment     Microscopic Examination See below:     Urinalysis Reflex Comment    Vitamin D,25-Hydroxy    Collection Time: 12/13/24  9:35 AM    Specimen: Arm, Right; Blood    Blood  Release to adelina   Result Value Ref Range    25 Hydroxy, Vitamin D 25.0 (L) 30.0 - 100.0 ng/mL   Hemoglobin A1c    Collection Time: 12/13/24  9:35 AM    Specimen: Arm, Right; Blood    Blood  Release to adelina   Result Value Ref Range    Hemoglobin A1C 5.6 4.8 - 5.6 %   Lipid Panel    Collection Time: 12/13/24  9:35 AM    Specimen: Arm, Right; Blood    Blood  Release to adelina   Result Value Ref Range    Total Cholesterol 178 100 - 199 mg/dL    Triglycerides 136 0 - 149 mg/dL    HDL Cholesterol 49 >39 mg/dL    VLDL Cholesterol Nawaf 24 5 - 40 mg/dL    LDL Chol Calc (NIH) 105 (H) 0 - 99 mg/dL   Comprehensive Metabolic Panel    Collection Time: 12/13/24  9:35 AM    Specimen: Arm, Right; Blood    Blood  Release to adelina   Result Value Ref Range    Glucose 109 (H) 70 - 99 mg/dL    BUN 9 6 -  24 mg/dL    Creatinine 0.91 0.57 - 1.00 mg/dL    EGFR Result 82 >59 mL/min/1.73    BUN/Creatinine Ratio 10 9 - 23    Sodium 140 134 - 144 mmol/L    Potassium 4.2 3.5 - 5.2 mmol/L    Chloride 104 96 - 106 mmol/L    Total CO2 23 20 - 29 mmol/L    Calcium 9.3 8.7 - 10.2 mg/dL    Total Protein 6.7 6.0 - 8.5 g/dL    Albumin 4.4 3.9 - 4.9 g/dL    Globulin 2.3 1.5 - 4.5 g/dL    Total Bilirubin 0.5 0.0 - 1.2 mg/dL    Alkaline Phosphatase 59 44 - 121 IU/L    AST (SGOT) 12 0 - 40 IU/L    ALT (SGPT) 9 0 - 32 IU/L   TSH Rfx On Abnormal To Free T4    Collection Time: 12/13/24  9:35 AM    Specimen: Arm, Right; Blood    Blood  Release to adelina   Result Value Ref Range    TSH 1.600 0.450 - 4.500 uIU/mL   Microscopic Examination -    Collection Time: 12/13/24  9:35 AM    Urine  Release to adelina   Result Value Ref Range    WBC, UA None seen 0 - 5 /hpf    RBC, UA None seen 0 - 2 /hpf    Epithelial Cells (non renal) 0-10 0 - 10 /hpf    Casts None seen None seen /lpf    Bacteria, UA None seen None seen/Few   CBC & Differential    Collection Time: 12/13/24  9:35 AM    Specimen: Arm, Right; Blood    Blood  Release to adelina   Result Value Ref Range    WBC 3.8 3.4 - 10.8 x10E3/uL    RBC 4.67 3.77 - 5.28 x10E6/uL    Hemoglobin 10.9 (L) 11.1 - 15.9 g/dL    Hematocrit 35.7 34.0 - 46.6 %    MCV 76 (L) 79 - 97 fL    MCH 23.3 (L) 26.6 - 33.0 pg    MCHC 30.5 (L) 31.5 - 35.7 g/dL    RDW 15.8 (H) 11.7 - 15.4 %    Platelets 270 150 - 450 x10E3/uL    Neutrophil Rel % 68 Not Estab. %    Lymphocyte Rel % 21 Not Estab. %    Monocyte Rel % 7 Not Estab. %    Eosinophil Rel % 2 Not Estab. %    Basophil Rel % 1 Not Estab. %    Neutrophils Absolute 2.6 1.4 - 7.0 x10E3/uL    Lymphocytes Absolute 0.8 0.7 - 3.1 x10E3/uL    Monocytes Absolute 0.3 0.1 - 0.9 x10E3/uL    Eosinophils Absolute 0.1 0.0 - 0.4 x10E3/uL    Basophils Absolute 0.0 0.0 - 0.2 x10E3/uL    Immature Granulocyte Rel % 1 Not Estab. %    Immature Grans Absolute 0.0 0.0 - 0.1 x10E3/uL          Assessment / Plan      Assessment/Plan:   Diagnoses and all orders for this visit:    1. Overweight (BMI 25.0-29.9) (Primary)  Assessment & Plan:  Status post sleeve gastrectomy in 8/2020, having lost per her account well over 100 pounds at that time, subsequently regained 40 pounds of weight.  Started 2 months ago and Zepbound 2.5 mg subcu weekly, increased 1 month ago to 5 mg subcu weekly.  She has had a total of 22 pounds of weight loss in last 2 months including 12 pounds over the last month.  She does have some mild to moderate transient nausea with the Zepbound therapy, and given the side effect along with significant weight loss, will simply continue her current Zepbound 5 mg subcu weekly along with continuation of her healthy diet and exercise.  Follow-up in 1 month for review.     Orders:  -     Tirzepatide-Weight Management (ZEPBOUND) 5 MG/0.5ML solution auto-injector; Inject 0.5 mL under the skin into the appropriate area as directed 1 (One) Time Per Week.  Dispense: 2 mL; Refill: 0    2. Nausea  Assessment & Plan:  Mild to moderate nausea for 1 to 2 days post each dose of Zepbound.  Treat with Zofran, and for now continue her current dose of Zepbound 5 mg subcu weekly rather than titrating up given excellent weight loss on her current regimen.    Orders:  -     ondansetron ODT (ZOFRAN-ODT) 4 MG disintegrating tablet; Place 1 tablet on the tongue Every 8 (Eight) Hours As Needed for Vomiting or Nausea.  Dispense: 6 tablet; Refill: 0    3. History of sleeve gastrectomy  Assessment & Plan:  Status post sleeve gastrectomy in 8/2020, having lost per her account well over 100 pounds at that time, subsequently regained 40 pounds of weight.  Started 2 months ago and Zepbound 2.5 mg subcu weekly, increased 1 month ago to 5 mg subcu weekly.  She has had a total of 22 pounds of weight loss in last 2 months including 12 pounds over the last month.  She does have some mild to moderate transient nausea with the  Zepbound therapy, and given the side effect along with significant weight loss, will simply continue her current Zepbound 5 mg subcu weekly along with continuation of her healthy diet and exercise.  Follow-up in 1 month for review.       4. Other iron deficiency anemia  Assessment & Plan:  History of menorrhagia with secondary iron deficiency per lab testing 1 month ago.  Started at that time on iron sulfate 325 mg daily.  Will repeat CBC and iron studies.  She has no need for contraception having had recent post child delivery bilateral salpingectomy, but given her menorrhagia would benefit from hormone manipulation.  Discussed today option of oral contraceptives versus her Mirena to control her flow.  She prefers consideration of Mirena and will schedule a visit with her gynecologist to discuss this option.     Orders:  -     CBC & Differential; Future  -     Iron Profile; Future  -     Ferritin; Future    5. Menorrhagia with regular cycle  Assessment & Plan:  History of menorrhagia with secondary iron deficiency per lab testing 1 month ago.  Started at that time on iron sulfate 325 mg daily.  Will repeat CBC and iron studies.  She has no need for contraception having had recent post child delivery bilateral salpingectomy, but given her menorrhagia would benefit from hormone manipulation.  Discussed today option of oral contraceptives versus her Mirena to control her flow.  She prefers consideration of Mirena and will schedule a visit with her gynecologist to discuss this option.      6. Encounter for screening mammogram for malignant neoplasm of breast  Assessment & Plan:  Patient had bilateral screening mammogram in 6/2023 with subsequent diagnostic right mammogram that was unremarkable.  Will update bilateral screening mammogram with referral given.    Orders:  -     Mammo Screening Digital Tomosynthesis Bilateral With CAD; Future    7. Parent refuses immunizations  Assessment & Plan:  Declines recommended  influenza and COVID-19 vaccines despite counseling regarding safety and efficacy.          Follow Up:   Return in about 1 month (around 2/16/2025) for Recheck.      At [facility], we believe that sharing information builds trust and better relationships. You are receiving this note because you recently visited [facility]. It is possible you will see health information before a provider has talked with you about it. This kind of information can be easy to misunderstand. To help you fully understand what it means for your health, we urge you to discuss this note with your provider.    Harley Rodriguez MD  [facility]

## 2025-01-16 NOTE — ASSESSMENT & PLAN NOTE
Patient had bilateral screening mammogram in 6/2023 with subsequent diagnostic right mammogram that was unremarkable.  Will update bilateral screening mammogram with referral given.

## 2025-01-29 ENCOUNTER — OFFICE VISIT (OUTPATIENT)
Dept: FAMILY MEDICINE CLINIC | Facility: CLINIC | Age: 40
End: 2025-01-29
Payer: COMMERCIAL

## 2025-01-29 VITALS
OXYGEN SATURATION: 98 % | BODY MASS INDEX: 29.41 KG/M2 | RESPIRATION RATE: 16 BRPM | WEIGHT: 183 LBS | TEMPERATURE: 98.2 F | HEIGHT: 66 IN | HEART RATE: 104 BPM | SYSTOLIC BLOOD PRESSURE: 112 MMHG | DIASTOLIC BLOOD PRESSURE: 70 MMHG

## 2025-01-29 DIAGNOSIS — R09.81 NASAL CONGESTION: Primary | ICD-10-CM

## 2025-01-29 DIAGNOSIS — R11.0 NAUSEA: ICD-10-CM

## 2025-01-29 DIAGNOSIS — U07.1 COVID: ICD-10-CM

## 2025-01-29 LAB
EXPIRATION DATE: ABNORMAL
EXPIRATION DATE: NORMAL
FLUAV AG UPPER RESP QL IA.RAPID: NOT DETECTED
FLUBV AG UPPER RESP QL IA.RAPID: NOT DETECTED
INTERNAL CONTROL: ABNORMAL
INTERNAL CONTROL: NORMAL
Lab: ABNORMAL
Lab: NORMAL
S PYO AG THROAT QL: NEGATIVE
SARS-COV-2 AG UPPER RESP QL IA.RAPID: DETECTED

## 2025-01-29 PROCEDURE — 99213 OFFICE O/P EST LOW 20 MIN: CPT | Performed by: NURSE PRACTITIONER

## 2025-01-29 PROCEDURE — 87428 SARSCOV & INF VIR A&B AG IA: CPT | Performed by: NURSE PRACTITIONER

## 2025-01-29 PROCEDURE — 87880 STREP A ASSAY W/OPTIC: CPT | Performed by: NURSE PRACTITIONER

## 2025-01-29 RX ORDER — ONDANSETRON 4 MG/1
4 TABLET, ORALLY DISINTEGRATING ORAL EVERY 8 HOURS PRN
Qty: 20 TABLET | Refills: 0 | Status: SHIPPED | OUTPATIENT
Start: 2025-01-29

## 2025-01-29 RX ORDER — BROMPHENIRAMINE MALEATE, PSEUDOEPHEDRINE HYDROCHLORIDE, AND DEXTROMETHORPHAN HYDROBROMIDE 2; 30; 10 MG/5ML; MG/5ML; MG/5ML
10 SYRUP ORAL 4 TIMES DAILY PRN
Qty: 200 ML | Refills: 0 | Status: SHIPPED | OUTPATIENT
Start: 2025-01-29

## 2025-01-29 RX ORDER — METHYLPREDNISOLONE 4 MG/1
TABLET ORAL
Qty: 21 TABLET | Refills: 0 | Status: SHIPPED | OUTPATIENT
Start: 2025-01-29

## 2025-01-29 RX ORDER — FLUTICASONE PROPIONATE 50 MCG
2 SPRAY, SUSPENSION (ML) NASAL DAILY
Qty: 9.9 G | Refills: 0 | Status: SHIPPED | OUTPATIENT
Start: 2025-01-29

## 2025-01-29 NOTE — PROGRESS NOTES
Office Note     Name: Ely De La Garza    : 1985     MRN: [id number]     Chief Complaint  Nasal Congestion, Cough, Sore Throat, Earache, Vomiting, and Headache    Subjective     History of Present Illness:  Ely De La Garza is a 39 y.o. female who presents today for multiple acute complaints including fever, sore throat, myalgias, arthralgias, cough, vomiting and nausea and ear pain.  States the onset of her symptoms as 2 days ago.  She has utilized NyQuil without much improvement in her symptoms.  She has maintained adequate hydration with good urinary output.  She has no further complaints or concerns today    Review of Systems   Constitutional:  Positive for chills, fatigue and fever.   HENT:  Positive for sore throat.    Respiratory:  Positive for cough and chest tightness. Negative for shortness of breath.    Gastrointestinal:  Positive for nausea and vomiting.   Genitourinary:  Negative for decreased urine volume.   Musculoskeletal:  Positive for arthralgias and myalgias.   Neurological:  Positive for weakness. Negative for dizziness, light-headedness and headache.       Objective     Past Medical History:   Diagnosis Date    Abnormal findings on diagnostic imaging of breast     microcalcification in right breast    Anemia 11/15/2024    Contact with and (suspected) exposure to other viral communicable diseases     Depression     Encounter for insertion of Mirena IUD 2019    Due for removal 2025    Esophageal reflux     Female infertility     took fertility meds to get pregnant    Generalized anxiety disorder     GERD (gastroesophageal reflux disease)     Herpes zoster     Shingles    History of COVID-19     Hx of Gestational hypertension 2009    was overweight at that time    Hx of Polycystic ovary syndrome     Hx of Trochanteric bursitis of left hip     Left hip replacement 2023    Hx of Type 2 diabetes mellitus     never on insulin/metformin -  lost weight 8 years ago     Migraine without aura, not intractable, without status migrainosus     Nausea and vomiting     currently with pregnancy    Obese     Placenta previa antepartum 2024    Posterior Marginal    PONV (postoperative nausea and vomiting)     Pregnancy 2024    Sacrococcygeal disorders, not elsewhere classified     Wears contact lenses     Wears eyeglasses      Past Surgical History:   Procedure Laterality Date    ABDOMINOPLASTY      BODY LIFT N/A 2017    Procedure: CIRCUMFERENTIAL BODY LIFT;  Surgeon: Enrique Leroy MD;  Location:  [facility];  Service:     BODY LIFT Bilateral 2019    Procedure: BILATERAL THIGH  LIFT WITH LIPOSUCTION;  Surgeon: Enrique Leroy MD;  Location:  [facility];  Service: Plastics    BREAST AUGMENTATION       SECTION       SECTION N/A 2024    Procedure:  SECTION REPEAT WITH SALPINGECTOMY;  Surgeon: Itzel Damon MD;  Location:  [facility];  Service: Obstetrics/Gynecology;  Laterality: N/A;    GASTRIC SLEEVE LAPAROSCOPIC  08/10/2020    INTRAUTERINE DEVICE INSERTION  2019    Mirena; due for removal 2025    LIPOMA EXCISION      LIPOSUCTION ABDOMINAL N/A 2019    Procedure: LIPOSUCTION ABDOMINAL;  Surgeon: Enrique Leroy MD;  Location:  [facility];  Service: Plastics    LUNG BIOPSY      OTHER SURGICAL HISTORY      Back Lift    REPLACEMENT TOTAL HIP LATERAL POSITION Left     ; born with hip dysplasia    TONSILLECTOMY      WISDOM TOOTH EXTRACTION       Family History   Problem Relation Age of Onset    Heart disease Father     Diabetes Father     Cancer Father     Heart disease Mother     Heart disease Maternal Grandmother     Thyroid disease Maternal Grandmother     Cervical cancer Maternal Grandmother     Uterine cancer Maternal Grandmother     Hypertension Other     Obesity Other     Hypertension Other     Breast cancer Other         2 SISTERS OF MATERNAL GRANDFATHER       Vital Signs  /70 (BP Location: Left  "arm, Patient Position: Sitting, Cuff Size: Adult)   Pulse 104   Temp 98.2 °F (36.8 °C) (Temporal)   Resp 16   Ht 167.6 cm (66\")   Wt 83 kg (183 lb)   SpO2 98%   BMI 29.54 kg/m²   Estimated body mass index is 29.54 kg/m² as calculated from the following:    Height as of this encounter: 167.6 cm (66\").    Weight as of this encounter: 83 kg (183 lb).  Facility age limit for growth %rob is 20 years.    Physical Exam  Vitals reviewed.   Constitutional:       Appearance: Normal appearance.   HENT:      Head: Normocephalic and atraumatic.      Right Ear: Ear canal and external ear normal.      Left Ear: Ear canal and external ear normal.      Ears:      Comments: Bilateral serous effusions noted     Nose: Congestion present.      Mouth/Throat:      Pharynx: Oropharynx is clear. Posterior oropharyngeal erythema present.   Eyes:      Conjunctiva/sclera: Conjunctivae normal.   Cardiovascular:      Rate and Rhythm: Normal rate and regular rhythm.      Pulses: Normal pulses.      Heart sounds: Normal heart sounds.   Pulmonary:      Effort: Pulmonary effort is normal.      Breath sounds: Normal breath sounds.   Abdominal:      General: Bowel sounds are normal.      Palpations: Abdomen is soft.   Musculoskeletal:      Cervical back: Neck supple.   Skin:     General: Skin is warm and dry.   Neurological:      Mental Status: She is alert and oriented to person, place, and time.             POCT Results (if applicable):  Results for orders placed or performed in visit on 01/29/25   Covid-19 + Flu A&B AG, Veritor    Collection Time: 01/29/25  3:01 PM    Specimen: Swab   Result Value Ref Range    SARS Antigen Detected (A) Not Detected, Presumptive Negative    Influenza A Antigen DEVAN Not Detected Not Detected    Influenza B Antigen DEVAN Not Detected Not Detected    Internal Control Passed Passed    Lot Number [id number]     Expiration Date 11,142,025    POC Rapid Strep A    Collection Time: 01/29/25  3:02 PM    Specimen: Swab "   Result Value Ref Range    Rapid Strep A Screen Negative Negative, VALID, INVALID, Not Performed    Internal Control Passed Passed    Lot Number [id number]     Expiration Date 1,032,027             Assessment and Plan     Diagnoses and all orders for this visit:    1. Nasal congestion (Primary)  -     Covid-19 + Flu A&B AG, Veritor  -     POC Rapid Strep A    2. Nausea  -     ondansetron ODT (ZOFRAN-ODT) 4 MG disintegrating tablet; Place 1 tablet on the tongue Every 8 (Eight) Hours As Needed for Vomiting or Nausea.  Dispense: 20 tablet; Refill: 0    3. COVID  Assessment & Plan:  Patient testing positive for COVID in office today, negative for influenza or strep. We discussed symptom management, being provided with prescription for Bromfed since NyQuil is not helping with her cough and congestion.  Patient will also receive prescription for Zofran to address her nausea and vomiting.  She is advised to drink plenty of liquids to prevent dehydration. Patient also suffering from bilateral ear effusion, Medrol dose pack to help with eustachian tube dysfunction.  Patient will follow-up with her primary, Dr. Rodriguez if no improvement    Orders:  -     methylPREDNISolone (MEDROL) 4 MG dose pack; Take as directed on package instructions.  Dispense: 21 tablet; Refill: 0  -     fluticasone (FLONASE) 50 MCG/ACT nasal spray; Administer 2 sprays into the nostril(s) as directed by provider Daily.  Dispense: 9.9 g; Refill: 0  -     brompheniramine-pseudoephedrine-DM 30-2-10 MG/5ML syrup; Take 10 mL by mouth 4 (Four) Times a Day As Needed for Congestion or Cough.  Dispense: 200 mL; Refill: 0  -     ondansetron ODT (ZOFRAN-ODT) 4 MG disintegrating tablet; Place 1 tablet on the tongue Every 8 (Eight) Hours As Needed for Vomiting or Nausea.  Dispense: 20 tablet; Refill: 0           Follow Up  No follow-ups on file.    CHUCK Castañeda

## 2025-01-29 NOTE — ASSESSMENT & PLAN NOTE
Patient testing positive for COVID in office today, negative for influenza or strep. We discussed symptom management, being provided with prescription for Bromfed since NyQuil is not helping with her cough and congestion.  Patient will also receive prescription for Zofran to address her nausea and vomiting.  She is advised to drink plenty of liquids to prevent dehydration. Patient also suffering from bilateral ear effusion, Medrol dose pack to help with eustachian tube dysfunction.  Patient will follow-up with her primary, Dr. Rodriguez if no improvement

## 2025-02-07 DIAGNOSIS — Z12.31 ENCOUNTER FOR SCREENING MAMMOGRAM FOR MALIGNANT NEOPLASM OF BREAST: ICD-10-CM

## 2025-02-10 ENCOUNTER — TELEPHONE (OUTPATIENT)
Dept: FAMILY MEDICINE CLINIC | Facility: CLINIC | Age: 40
End: 2025-02-10
Payer: COMMERCIAL

## 2025-02-10 NOTE — TELEPHONE ENCOUNTER
----- Message from Harley Rodriguez sent at 2/7/2025  8:41 PM EST -----  Please advise patient her recent bilateral screening mammogram was unremarkable, and that she should repeat another bilateral screening mammogram in 1-2 years    I have spoke with her regarding her mamm results. TF

## 2025-02-13 ENCOUNTER — OFFICE VISIT (OUTPATIENT)
Dept: FAMILY MEDICINE CLINIC | Facility: CLINIC | Age: 40
End: 2025-02-13
Payer: COMMERCIAL

## 2025-02-13 VITALS
HEIGHT: 66 IN | WEIGHT: 181.8 LBS | OXYGEN SATURATION: 100 % | TEMPERATURE: 97.6 F | DIASTOLIC BLOOD PRESSURE: 84 MMHG | HEART RATE: 94 BPM | SYSTOLIC BLOOD PRESSURE: 116 MMHG | BODY MASS INDEX: 29.22 KG/M2

## 2025-02-13 DIAGNOSIS — N92.0 MENORRHAGIA WITH REGULAR CYCLE: ICD-10-CM

## 2025-02-13 DIAGNOSIS — E66.3 OVERWEIGHT (BMI 25.0-29.9): Primary | ICD-10-CM

## 2025-02-13 DIAGNOSIS — D50.8 OTHER IRON DEFICIENCY ANEMIA: ICD-10-CM

## 2025-02-13 LAB
EXPIRATION DATE: ABNORMAL
HGB BLDA-MCNC: 11.1 G/DL (ref 12–17)
Lab: ABNORMAL

## 2025-02-13 PROCEDURE — 99214 OFFICE O/P EST MOD 30 MIN: CPT | Performed by: INTERNAL MEDICINE

## 2025-02-13 PROCEDURE — 85018 HEMOGLOBIN: CPT | Performed by: INTERNAL MEDICINE

## 2025-02-13 NOTE — PROGRESS NOTES
Follow Up Office Visit      Date: 2025   Patient Name: Ely De La Garza  : 1985   MRN: [id number]     Chief Complaint:    Chief Complaint   Patient presents with    Weight Management       History of Present Illness: Ely De La Garza is a 39 y.o. female who is here today for 1 month follow-up of her obesity improved overweight, status post previous sleeve gastrectomy in 2020, having been started on Zepbound med 2024.  At her weight check 1 month ago, she had lost 12 pounds from her previous visit prompting continuation of her Zepbound 5 mg daily.  She is only lost 3 pounds in the intervening months.  She does have some transient nausea for the first day but no other side effects of the Zepbound.  She still is eating a healthy diet with some vegetables, limited fruits, no fried foods, no significant fast foods or junk foods, drinks total of 240 trista of cold brew [employer] daily but otherwise water.  Moderate physical activity.  Also has a history of menorrhagia with secondary iron deficiency anemia, taking iron sulfate 325 mg daily for the last 2 months now most recent hemoglobin 10.9 now 2 months prior, did not get requested lab testing last month, due for updated testing today.  Not on any hormones, status post BTL.      Subjective      Review of Systems:   Review of Systems    I have reviewed the patients family history, social history, past medical history, past surgical history and have updated it as appropriate.     Medications:     Current Outpatient Medications:     Cholecalciferol (Vitamin D) 50 MCG (2000 UT) tablet, Take 1 tablet by mouth Daily., Disp: 90 tablet, Rfl: 3    ferrous sulfate 325 (65 FE) MG tablet, Take 1 tablet by mouth Daily With Breakfast. Take with vitamin C 250-500 mg once daily, Disp: 90 tablet, Rfl: 1    fluticasone (FLONASE) 50 MCG/ACT nasal spray, Administer 2 sprays into the nostril(s) as directed by provider Daily., Disp: 9.9 g, Rfl: 0    loratadine  "(Claritin) 10 MG tablet, Take 1 tablet by mouth Daily., Disp: 90 tablet, Rfl: 3    ondansetron ODT (ZOFRAN-ODT) 4 MG disintegrating tablet, Place 1 tablet on the tongue Every 8 (Eight) Hours As Needed for Vomiting or Nausea., Disp: 20 tablet, Rfl: 0    pantoprazole (Protonix) 40 MG EC tablet, Take 1 tablet by mouth Daily., Disp: 90 tablet, Rfl: 3    PARoxetine (PAXIL) 20 MG tablet, 1 tablet., Disp: , Rfl:     SUMAtriptan (Imitrex) 100 MG tablet, Take one tablet at onset of headache. May repeat dose one time in 2 hours if headache not relieved., Disp: 9 tablet, Rfl: 5    Tirzepatide-Weight Management (ZEPBOUND) 7.5 MG/0.5ML solution auto-injector, Inject 0.5 mL under the skin into the appropriate area as directed 1 (One) Time Per Week., Disp: 2 mL, Rfl: 0    topiramate (Topamax) 50 MG tablet, 0.5 tablets at bedtime, then increase to 1 tablet at bedtime after 2 weeks, Disp: 30 tablet, Rfl: 1    Vraylar 1.5 MG capsule capsule, 1 capsule., Disp: , Rfl:     Allergies:   No Known Allergies    Objective     Physical Exam: Please see above  Vital Signs:   Vitals:    02/13/25 1000   BP: 116/84   BP Location: Left arm   Patient Position: Sitting   Cuff Size: Adult   Pulse: 94   Temp: 97.6 °F (36.4 °C)   TempSrc: Temporal   SpO2: 100%   Weight: 82.5 kg (181 lb 12.8 oz)   Height: 167.6 cm (66\")     Body mass index is 29.34 kg/m².          Physical Exam  Constitutional:       General: She is not in acute distress.     Appearance: Normal appearance. She is not ill-appearing.      Comments: Pleasant, healthy, well appearing, NAD, BMI 29.3 reflecting a 3 pound weight loss in the last month and a 15 pound weight loss over the last 2 months   Cardiovascular:      Rate and Rhythm: Normal rate and regular rhythm.      Heart sounds: Normal heart sounds. No murmur heard.     No friction rub. No gallop.   Pulmonary:      Effort: Pulmonary effort is normal. No respiratory distress.      Breath sounds: Normal breath sounds.   Neurological: "      General: No focal deficit present.      Mental Status: She is alert.   Psychiatric:         Mood and Affect: Mood normal.         Procedures    Results:   Labs:   Hemoglobin A1C   Date Value Ref Range Status   12/13/2024 5.6 4.8 - 5.6 % Final     Comment:              Prediabetes: 5.7 - 6.4           Diabetes: >6.4           Glycemic control for adults with diabetes: <7.0       TSH   Date Value Ref Range Status   12/13/2024 1.600 0.450 - 4.500 uIU/mL Final        POCT Results (if applicable):   Results for orders placed or performed in visit on 02/13/25   POC Hemoglobin    Collection Time: 02/13/25 10:45 AM    Specimen: Blood   Result Value Ref Range    Hemoglobin 11.1 (A) 12.0 - 17.0 g/dL    Lot Number [id number]     Expiration Date 11/21/2025          Assessment / Plan      Assessment/Plan:   Diagnoses and all orders for this visit:    1. Overweight (BMI 25.0-29.9) (Primary)  Assessment & Plan:  Status post sleeve gastrectomy in 8/2020, having lost per her account well over 100 pounds at that time, subsequently regained 40 pounds of weight.  Started in 11/2024 on Zepbound 2.5 mg subcu weekly, most recently has been taking 5 mg subcu weekly for last 2 months, initially having had 12 pound weight loss in the first month, but now just 3 pound weight loss in the last month.  Plan increase Zepbound up to 7.5 mg subcu weekly, reassessing clinically in 1 month.  Continue efforts with healthy diet and exercise.  Follow-up in 1 month for review.     Orders:  -     Tirzepatide-Weight Management (ZEPBOUND) 7.5 MG/0.5ML solution auto-injector; Inject 0.5 mL under the skin into the appropriate area as directed 1 (One) Time Per Week.  Dispense: 2 mL; Refill: 0    2. Other iron deficiency anemia  Assessment & Plan:  History of menorrhagia with secondary iron deficiency per lab testing 1 month ago.  Started at that time on iron sulfate 325 mg daily.  20.  Hemoglobin today 11.1 versus 10.9 now 2 months prior, noting we will  obtain formal CBC and iron studies as requested last month she has no need for contraception having had recent post child delivery bilateral salpingectomy, but given her menorrhagia would benefit from hormone manipulation.  She previously had declined consideration of oral contraceptives, and she first to discuss option of utilizing Mirena, with patient to schedule a visit with her gynecologist to discuss this option.     Orders:  -     POC Hemoglobin  -     Ferritin  -     Iron Profile  -     CBC & Differential    3. Menorrhagia with regular cycle  Assessment & Plan:  History of menorrhagia with secondary iron deficiency per lab testing 1 month ago.  Started at that time on iron sulfate 325 mg daily.  20.  Hemoglobin today 11.1 versus 10.9 now 2 months prior, noting we will obtain formal CBC and iron studies as requested last month she has no need for contraception having had recent post child delivery bilateral salpingectomy, but given her menorrhagia would benefit from hormone manipulation.  She previously had declined consideration of oral contraceptives, and she first to discuss option of utilizing Mirena, with patient to schedule a visit with her gynecologist to discuss this option.    Orders:  -     POC Hemoglobin        Follow Up:   Return in about 1 month (around 3/13/2025) for Recheck.      At [facility], we believe that sharing information builds trust and better relationships. You are receiving this note because you recently visited [facility]. It is possible you will see health information before a provider has talked with you about it. This kind of information can be easy to misunderstand. To help you fully understand what it means for your health, we urge you to discuss this note with your provider.    Harley Rodriguez MD  [facility]

## 2025-02-13 NOTE — PROGRESS NOTES
..  Venipuncture Blood Specimen Collection  Venipuncture performed in left arm by Kristine Yang with good hemostasis. Patient tolerated the procedure well without complications.   02/13/25   Kristine Yang

## 2025-02-13 NOTE — ASSESSMENT & PLAN NOTE
History of menorrhagia with secondary iron deficiency per lab testing 1 month ago.  Started at that time on iron sulfate 325 mg daily.  20.  Hemoglobin today 11.1 versus 10.9 now 2 months prior, noting we will obtain formal CBC and iron studies as requested last month she has no need for contraception having had recent post child delivery bilateral salpingectomy, but given her menorrhagia would benefit from hormone manipulation.  She previously had declined consideration of oral contraceptives, and she first to discuss option of utilizing Mirena, with patient to schedule a visit with her gynecologist to discuss this option.

## 2025-02-13 NOTE — ASSESSMENT & PLAN NOTE
Status post sleeve gastrectomy in 8/2020, having lost per her account well over 100 pounds at that time, subsequently regained 40 pounds of weight.  Started in 11/2024 on Zepbound 2.5 mg subcu weekly, most recently has been taking 5 mg subcu weekly for last 2 months, initially having had 12 pound weight loss in the first month, but now just 3 pound weight loss in the last month.  Plan increase Zepbound up to 7.5 mg subcu weekly, reassessing clinically in 1 month.  Continue efforts with healthy diet and exercise.  Follow-up in 1 month for review.

## 2025-02-14 LAB
BASOPHILS # BLD AUTO: 0 X10E3/UL (ref 0–0.2)
BASOPHILS NFR BLD AUTO: 1 %
EOSINOPHIL # BLD AUTO: 0.1 X10E3/UL (ref 0–0.4)
EOSINOPHIL NFR BLD AUTO: 2 %
ERYTHROCYTE [DISTWIDTH] IN BLOOD BY AUTOMATED COUNT: 16.6 % (ref 11.7–15.4)
FERRITIN SERPL-MCNC: 10 NG/ML (ref 15–150)
HCT VFR BLD AUTO: 37.5 % (ref 34–46.6)
HGB BLD-MCNC: 11.2 G/DL (ref 11.1–15.9)
IMM GRANULOCYTES # BLD AUTO: 0 X10E3/UL (ref 0–0.1)
IMM GRANULOCYTES NFR BLD AUTO: 0 %
IRON SATN MFR SERPL: 8 % (ref 15–55)
IRON SERPL-MCNC: 36 UG/DL (ref 27–159)
LYMPHOCYTES # BLD AUTO: 1.2 X10E3/UL (ref 0.7–3.1)
LYMPHOCYTES NFR BLD AUTO: 21 %
MCH RBC QN AUTO: 23.2 PG (ref 26.6–33)
MCHC RBC AUTO-ENTMCNC: 29.9 G/DL (ref 31.5–35.7)
MCV RBC AUTO: 78 FL (ref 79–97)
MONOCYTES # BLD AUTO: 0.3 X10E3/UL (ref 0.1–0.9)
MONOCYTES NFR BLD AUTO: 6 %
NEUTROPHILS # BLD AUTO: 4 X10E3/UL (ref 1.4–7)
NEUTROPHILS NFR BLD AUTO: 70 %
PLATELET # BLD AUTO: 321 X10E3/UL (ref 150–450)
RBC # BLD AUTO: 4.82 X10E6/UL (ref 3.77–5.28)
TIBC SERPL-MCNC: 424 UG/DL (ref 250–450)
UIBC SERPL-MCNC: 388 UG/DL (ref 131–425)
WBC # BLD AUTO: 5.7 X10E3/UL (ref 3.4–10.8)

## 2025-02-17 ENCOUNTER — TELEPHONE (OUTPATIENT)
Dept: FAMILY MEDICINE CLINIC | Facility: CLINIC | Age: 40
End: 2025-02-17
Payer: COMMERCIAL

## 2025-02-17 NOTE — TELEPHONE ENCOUNTER
----- Message from Harley Rodriguez sent at 2/14/2025  6:21 PM EST -----  Please advise patient that her recent screening for iron deficiency anemia revealed some persistence of iron deficiency with an improvement in her hemoglobin.  Recommendation is to continue her iron supplement and repeat testing in the next several months.  Patient is encouraged to follow-up with gynecology to address her menses which are likely the cause of her iron deficiency      I have spoke with her regarding her lab results. TF

## 2025-02-18 DIAGNOSIS — G43.109 MIGRAINE WITH AURA AND WITHOUT STATUS MIGRAINOSUS, NOT INTRACTABLE: ICD-10-CM

## 2025-02-18 RX ORDER — TOPIRAMATE 50 MG/1
TABLET, FILM COATED ORAL
Qty: 30 TABLET | Refills: 0 | Status: SHIPPED | OUTPATIENT
Start: 2025-02-18

## 2025-03-13 ENCOUNTER — OFFICE VISIT (OUTPATIENT)
Dept: FAMILY MEDICINE CLINIC | Facility: CLINIC | Age: 40
End: 2025-03-13
Payer: COMMERCIAL

## 2025-03-13 VITALS
WEIGHT: 170.2 LBS | OXYGEN SATURATION: 100 % | BODY MASS INDEX: 27.35 KG/M2 | TEMPERATURE: 97.8 F | HEART RATE: 106 BPM | SYSTOLIC BLOOD PRESSURE: 116 MMHG | DIASTOLIC BLOOD PRESSURE: 74 MMHG | HEIGHT: 66 IN

## 2025-03-13 DIAGNOSIS — E66.3 OVERWEIGHT (BMI 25.0-29.9): Primary | ICD-10-CM

## 2025-03-13 DIAGNOSIS — I95.1 ORTHOSTASIS: ICD-10-CM

## 2025-03-13 DIAGNOSIS — D50.8 OTHER IRON DEFICIENCY ANEMIA: ICD-10-CM

## 2025-03-13 DIAGNOSIS — G43.109 MIGRAINE WITH AURA AND WITHOUT STATUS MIGRAINOSUS, NOT INTRACTABLE: ICD-10-CM

## 2025-03-13 LAB
EXPIRATION DATE: ABNORMAL
HGB BLDA-MCNC: 11.8 G/DL (ref 12–17)
Lab: ABNORMAL

## 2025-03-13 NOTE — ASSESSMENT & PLAN NOTE
Patient describes some mild positional lightheadedness, fleeting noted, relates a history of POTS.  Her orthostatic vital signs at this time do not show any evidence of hypotension, with a mild increased pulse rate of 20 bpm.  Patient relates prior diagnosis of POTS which was treated with midodrine, this having been discontinued during her recent pregnancy, but currently she does not meet criteria for POTS, with her current symptom likely representing some mild volume depletion accentuated by her significant ongoing weight loss.  She also has a mild anemia albeit this improving, potentially contributing slightly to her positional transient lightheadedness.  We discussed need for pushing oral hydration, and avoiding abrupt positional changes.  Reassess clinically at follow-up visit.

## 2025-03-13 NOTE — ASSESSMENT & PLAN NOTE
History of menorrhagia with secondary iron deficiency no evidence of B12 or folic acid deficiency per lab testing in 12/2024, originally had a hemoglobin of 8.4 in 8/2024, and 10.9 in 12/2024.  At that time started on iron sulfate 25 mg daily.  In the last month her hemoglobin has increased from 11.1 up to 11.8 today.  Continue current iron supplementation and recheck another hemoglobin in 1 month

## 2025-03-13 NOTE — ASSESSMENT & PLAN NOTE
History of longstanding migraine headaches, having Topamax suppressive therapy discontinued during pregnancy last year.  She was started back on Topamax suppression in 12/2024, currently taking 50 mg nightly.  Overall her migraine pattern has improved with reduction in her migraine frequency to once weekly rather than 2+ times weekly.  She feels like the migraine triggers are typically related to cold weather.  Given she is having significant ongoing weight loss acknowledging weight loss suppressive effects with the Topamax, as well as upcoming warm weather, will continue her current regimen of Topamax 50 mg nightly at this time.

## 2025-03-13 NOTE — ASSESSMENT & PLAN NOTE
Status post sleeve gastrectomy in 8/2020, having lost per her account well over 100 pounds at that time, subsequently having regained 40 pounds of weight.  Started in 11/2024 on Zepbound 2.5 mg subcu weekly, most recently months ago having an increase in her Zepbound from 5 mg to 7.5 mg subcu weekly, also getting benefit from Topamax prescribed for migraine suppression..  She has lost another 11 pounds in weight, getting essentially down to her stated optimal weight both subjectively as well as from an medical standpoint.  She is having some mild positional lightheadedness which likely reflects her ongoing significant weight loss.  We have agreed to reduce the Zepbound from 7.5 mg back down to 5 mg subcu weekly, continuing efforts with healthy diet and exercise.  Follow-up in 1 month for review.

## 2025-03-13 NOTE — PROGRESS NOTES
Follow Up Office Visit      Date: 2025   Patient Name: Ely De La Garza  : 1985   MRN: [id number]     Chief Complaint:    Chief Complaint   Patient presents with    Weight Management       History of Present Illness: Ely De La Garza is a 39 y.o. female who is here today for review of her obesity improved overweight, last visit a month ago having an increase in her down from 5 mg subcu weekly up to 7.5 mg subcu weekly, also getting benefit of weight loss taking her Topamax 50 mg nightly for migraine suppression.  She continues to maintain a healthy lifestyle with diet eating vegetables, occasional fruits, drinking water, and being physically active.  She has lost another 11 pounds of weight in the last month, and prior to all efforts at weight loss, which included sleeve gastrectomy, has lost approximately 200 pounds of weight.  She does note in the last couple weeks that she feels slight lightheadedness when she changes position, this lasting about 30 seconds before resolving.  Does feel like her heart rate increases a little bit.  No intrinsic chest pains or dyspnea, no elevated heart rate without change in position.  Migraines have improved from twice weekly down to once weekly on the Topamax, easily aborted with use of Imitrex.  Also has had an iron deficiency anemia documented per lab testing a month ago, felt to be secondary to menometrorrhagia post childbirth 6 months ago.  Continues on Protonix 40 mg daily and iron sulfate daily, hemoglobin 1 month ago 11.2 increase 11.8 today..  No other acute concern at this time.    Subjective      Review of Systems:   Review of Systems    I have reviewed the patients family history, social history, past medical history, past surgical history and have updated it as appropriate.     Medications:     Current Outpatient Medications:     Cholecalciferol (Vitamin D) 50 MCG (2000) tablet, Take 1 tablet by mouth Daily., Disp: 90 tablet, Rfl: 3     "ferrous sulfate 325 (65 FE) MG tablet, Take 1 tablet by mouth Daily With Breakfast. Take with vitamin C 250-500 mg once daily, Disp: 90 tablet, Rfl: 1    fluticasone (FLONASE) 50 MCG/ACT nasal spray, Administer 2 sprays into the nostril(s) as directed by provider Daily., Disp: 9.9 g, Rfl: 0    loratadine (Claritin) 10 MG tablet, Take 1 tablet by mouth Daily., Disp: 90 tablet, Rfl: 3    ondansetron ODT (ZOFRAN-ODT) 4 MG disintegrating tablet, Place 1 tablet on the tongue Every 8 (Eight) Hours As Needed for Vomiting or Nausea., Disp: 20 tablet, Rfl: 0    pantoprazole (Protonix) 40 MG EC tablet, Take 1 tablet by mouth Daily., Disp: 90 tablet, Rfl: 3    PARoxetine (PAXIL) 20 MG tablet, 1 tablet., Disp: , Rfl:     SUMAtriptan (Imitrex) 100 MG tablet, Take one tablet at onset of headache. May repeat dose one time in 2 hours if headache not relieved., Disp: 9 tablet, Rfl: 5    topiramate (TOPAMAX) 50 MG tablet, TAKE 1/2 TABLET BY MOUTH AT BEDTIME, THEN INCREASE TO ONE TABLET AT BEDTIME AFTER  2  WEEKS, Disp: 30 tablet, Rfl: 0    Vraylar 1.5 MG capsule capsule, 1 capsule., Disp: , Rfl:     Tirzepatide-Weight Management (ZEPBOUND) 5 MG/0.5ML solution auto-injector, Inject 0.5 mL under the skin into the appropriate area as directed 1 (One) Time Per Week., Disp: 2 mL, Rfl: 0    Allergies:   No Known Allergies    Objective     Physical Exam: Please see above  Vital Signs:   Vitals:    03/13/25 0827   BP: 116/74   BP Location: Left arm   Patient Position: Sitting   Cuff Size: Adult   Pulse: 106   Temp: 97.8 °F (36.6 °C)   TempSrc: Temporal   SpO2: 100%   Weight: 77.2 kg (170 lb 3.2 oz)   Height: 167.6 cm (66\")   PainSc: 0-No pain     Body mass index is 27.47 kg/m².          Physical Exam  Constitutional:       General: She is not in acute distress.     Appearance: Normal appearance. She is not ill-appearing.      Comments: Very pleasant healthy-appearing 39-year-old female, BMI 27.4 reflecting an 11 pound weight loss in the last " month.    Orthostatic vital signs:  Supine blood pressure 120/81 pulse 71 regular; seated /92, pulse 80 and regular; standing /91 with pulse 91 and regular   Neck:      Vascular: No carotid bruit.   Cardiovascular:      Rate and Rhythm: Normal rate and regular rhythm.      Heart sounds: Normal heart sounds. No murmur heard.     No friction rub. No gallop.   Pulmonary:      Effort: Pulmonary effort is normal. No respiratory distress.      Breath sounds: Normal breath sounds.   Musculoskeletal:      Cervical back: No rigidity or tenderness.   Lymphadenopathy:      Cervical: No cervical adenopathy.   Neurological:      General: No focal deficit present.      Mental Status: She is alert.   Psychiatric:         Mood and Affect: Mood normal.         Procedures    Results:   Labs:   Hemoglobin A1C   Date Value Ref Range Status   12/13/2024 5.6 4.8 - 5.6 % Final     Comment:              Prediabetes: 5.7 - 6.4           Diabetes: >6.4           Glycemic control for adults with diabetes: <7.0       TSH   Date Value Ref Range Status   12/13/2024 1.600 0.450 - 4.500 uIU/mL Final        POCT Results (if applicable):   Results for orders placed or performed in visit on 03/13/25   POC Hemoglobin    Collection Time: 03/13/25  8:37 AM    Specimen: Blood   Result Value Ref Range    Hemoglobin 11.8 (A) 12.0 - 17.0 g/dL    Lot Number [id number]     Expiration Date 08/20/2025        Imaging:   No valid procedures specified.     Assessment / Plan      Assessment/Plan:   Diagnoses and all orders for this visit:    1. Overweight (BMI 25.0-29.9) (Primary)  Assessment & Plan:  Status post sleeve gastrectomy in 8/2020, having lost per her account well over 100 pounds at that time, subsequently having regained 40 pounds of weight.  Started in 11/2024 on Zepbound 2.5 mg subcu weekly, most recently months ago having an increase in her Zepbound from 5 mg to 7.5 mg subcu weekly, also getting benefit from Topamax prescribed for migraine  suppression..  She has lost another 11 pounds in weight, getting essentially down to her stated optimal weight both subjectively as well as from an medical standpoint.  She is having some mild positional lightheadedness which likely reflects her ongoing significant weight loss.  We have agreed to reduce the Zepbound from 7.5 mg back down to 5 mg subcu weekly, continuing efforts with healthy diet and exercise.  Follow-up in 1 month for review.     Orders:  -     Tirzepatide-Weight Management (ZEPBOUND) 5 MG/0.5ML solution auto-injector; Inject 0.5 mL under the skin into the appropriate area as directed 1 (One) Time Per Week.  Dispense: 2 mL; Refill: 0    2. Other iron deficiency anemia  Assessment & Plan:  History of menorrhagia with secondary iron deficiency no evidence of B12 or folic acid deficiency per lab testing in 12/2024, originally had a hemoglobin of 8.4 in 8/2024, and 10.9 in 12/2024.  At that time started on iron sulfate 25 mg daily.  In the last month her hemoglobin has increased from 11.1 up to 11.8 today.  Continue current iron supplementation and recheck another hemoglobin in 1 month    Orders:  -     POC Hemoglobin    3. Migraine with aura and without status migrainosus, not intractable  Assessment & Plan:  History of longstanding migraine headaches, having Topamax suppressive therapy discontinued during pregnancy last year.  She was started back on Topamax suppression in 12/2024, currently taking 50 mg nightly.  Overall her migraine pattern has improved with reduction in her migraine frequency to once weekly rather than 2+ times weekly.  She feels like the migraine triggers are typically related to cold weather.  Given she is having significant ongoing weight loss acknowledging weight loss suppressive effects with the Topamax, as well as upcoming warm weather, will continue her current regimen of Topamax 50 mg nightly at this time.      4. Orthostasis  Assessment & Plan:  Patient describes some mild  positional lightheadedness, fleeting noted, relates a history of POTS.  Her orthostatic vital signs at this time do not show any evidence of hypotension, with a mild increased pulse rate of 20 bpm.  Patient relates prior diagnosis of POTS which was treated with midodrine, this having been discontinued during her recent pregnancy, but currently she does not meet criteria for POTS, with her current symptom likely representing some mild volume depletion accentuated by her significant ongoing weight loss.  She also has a mild anemia albeit this improving, potentially contributing slightly to her positional transient lightheadedness.  We discussed need for pushing oral hydration, and avoiding abrupt positional changes.  Reassess clinically at follow-up visit.        Follow Up:   Return in about 1 month (around 4/13/2025).      At [facility], we believe that sharing information builds trust and better relationships. You are receiving this note because you recently visited [facility]. It is possible you will see health information before a provider has talked with you about it. This kind of information can be easy to misunderstand. To help you fully understand what it means for your health, we urge you to discuss this note with your provider.    Harley Rodriguez MD  [facility]

## 2025-03-23 DIAGNOSIS — G43.109 MIGRAINE WITH AURA AND WITHOUT STATUS MIGRAINOSUS, NOT INTRACTABLE: ICD-10-CM

## 2025-03-24 RX ORDER — TOPIRAMATE 50 MG/1
TABLET, FILM COATED ORAL
Qty: 30 TABLET | Refills: 1 | Status: SHIPPED | OUTPATIENT
Start: 2025-03-24

## 2025-04-14 ENCOUNTER — OFFICE VISIT (OUTPATIENT)
Dept: FAMILY MEDICINE CLINIC | Facility: CLINIC | Age: 40
End: 2025-04-14
Payer: COMMERCIAL

## 2025-04-14 VITALS
TEMPERATURE: 98.1 F | WEIGHT: 165.6 LBS | OXYGEN SATURATION: 97 % | HEART RATE: 84 BPM | HEIGHT: 66 IN | DIASTOLIC BLOOD PRESSURE: 84 MMHG | SYSTOLIC BLOOD PRESSURE: 125 MMHG | BODY MASS INDEX: 26.61 KG/M2

## 2025-04-14 DIAGNOSIS — D50.8 OTHER IRON DEFICIENCY ANEMIA: ICD-10-CM

## 2025-04-14 DIAGNOSIS — G43.109 MIGRAINE WITH AURA AND WITHOUT STATUS MIGRAINOSUS, NOT INTRACTABLE: ICD-10-CM

## 2025-04-14 DIAGNOSIS — J30.1 SEASONAL ALLERGIC RHINITIS DUE TO POLLEN: ICD-10-CM

## 2025-04-14 DIAGNOSIS — E66.3 OVERWEIGHT (BMI 25.0-29.9): Primary | ICD-10-CM

## 2025-04-14 PROCEDURE — 99214 OFFICE O/P EST MOD 30 MIN: CPT | Performed by: INTERNAL MEDICINE

## 2025-04-14 RX ORDER — FLUTICASONE PROPIONATE 50 MCG
2 SPRAY, SUSPENSION (ML) NASAL DAILY
Qty: 48 G | Refills: 3 | Status: SHIPPED | OUTPATIENT
Start: 2025-04-14

## 2025-04-14 RX ORDER — TOPIRAMATE 50 MG/1
75 TABLET, FILM COATED ORAL 2 TIMES DAILY
Qty: 135 TABLET | Refills: 3 | Status: SHIPPED | OUTPATIENT
Start: 2025-04-14

## 2025-04-14 NOTE — ASSESSMENT & PLAN NOTE
History of longstanding migraine headaches, having Topamax suppressive therapy discontinued during pregnancy last year.  She was started back on Topamax suppression in 12/2024, currently taking 50 mg nightly.  Patient indicates in general her migraine pattern has improved with reinitiating the Topamax but she still now having headaches on a weekly basis that are not being completely aborted with use of Imitrex.  Plan increase Topamax to 75 mg nightly reassessing clinically at follow-up visit in 3 months.  If still not having sufficient control we will then increase further to 100 mg nightly.  Advise if problems in the interim, patient being well versed in potential side effects.

## 2025-04-14 NOTE — ASSESSMENT & PLAN NOTE
Status post sleeve gastrectomy in 8/2020, having lost per her account well over 100 pounds at that time, subsequently having regained 40 pounds of weight.  Started in 11/2024 on Zepbound 2.5 mg subcu weekly, most recently months ago having an increase in her Zepbound from 5 mg to 7.5 mg subcu weekly, also getting benefit from Topamax prescribed for migraine suppression..  She has lost another 11 pounds in weight, getting essentially down to her stated optimal weight both subjectively as well as from an medical standpoint.  She is having some mild positional lightheadedness which likely reflects her ongoing significant weight loss.  2 months ago Zepbound 7.5 mg was reduced to 5 mg subcu weekly, having another 5 pounds of weight loss over the last month, and a greater than 200 pound weight loss since undergoing sleeve gastrectomy in 2020.  We discussed option of further reducing Zepbound versus continue current regimen.  Patient would like to continue current regimen which I think is reasonable while also pursuing healthy lifestyle with diet and exercise, also benefiting with use of Topamax for migraine headaches regarding her weight loss, and we will reassess in 3 months.

## 2025-04-14 NOTE — ASSESSMENT & PLAN NOTE
History of menorrhagia with secondary iron deficiency no evidence of B12 or folic acid deficiency per lab testing in 12/2024, originally had a hemoglobin of 8.4 in 8/2024, and 10.9 in 12/2024, 11.8 in 3/2025.  Currently maintained on iron supplement, plan to repeat hemoglobin at follow-up visit.   07-Dec-2018 22:37

## 2025-04-14 NOTE — PROGRESS NOTES
Follow Up Office Visit      Date: 2025   Patient Name: Ely De La Garza  : 1985   MRN: [id number]     Chief Complaint:    Chief Complaint   Patient presents with    Weight Management       History of Present Illness: Ely De La Garza is a 39 y.o. female who is here today for 1 month review of her obesity, status post sleeve gastrectomy, 2 months ago having had a reduction in her Zepbound 7.5 mg down to 5 mg subcu weekly given significant ongoing weight loss.  She continues to have appetite suppression with no major side effects on the Zepbound 5 mg regimen, still generally eating healthy diet and being physically active either walking or doing elliptical machine every other day.  Has had noted another 5 pound weight loss in the last month, in total just over 200 pounds weight loss since she underwent her sleeve gastrectomy in 2020.  Unrelated, patient is taking Topamax currently at 50 mg nightly for migraine suppression which also benefits her weight loss, but notes in the last month having some increasing frequency of headaches now weekly, and fairly significant severity of headaches despite use of Imitrex as needed.  Prior to her most recent pregnancy and subsequent completion she had taken Topamax 100 mg nightly with good tolerance.  Patient also notes recently having some increasing nasal congestion and drainage with some throat irritation, no significant cough, taking over-the-counter loratadine.  Does have a history of seasonal allergies.  Finally she has a history of iron deficiency anemia, secondary to menorrhagia, taking iron supplementation with laboratory testing in 2024 indicating iron deficiency with normal B12 folic acid levels at that time, most recent hemoglobin 11.81-month ago, having had a hemoglobin of 8.4 in 2024    Subjective      Review of Systems:   Review of Systems    I have reviewed the patients family history, social history, past medical history, past  "surgical history and have updated it as appropriate.     Medications:     Current Outpatient Medications:     Cholecalciferol (Vitamin D) 50 MCG (2000 UT) tablet, Take 1 tablet by mouth Daily., Disp: 90 tablet, Rfl: 3    ferrous sulfate 325 (65 FE) MG tablet, Take 1 tablet by mouth Daily With Breakfast. Take with vitamin C 250-500 mg once daily, Disp: 90 tablet, Rfl: 1    loratadine (Claritin) 10 MG tablet, Take 1 tablet by mouth Daily., Disp: 90 tablet, Rfl: 3    ondansetron ODT (ZOFRAN-ODT) 4 MG disintegrating tablet, Place 1 tablet on the tongue Every 8 (Eight) Hours As Needed for Vomiting or Nausea., Disp: 20 tablet, Rfl: 0    pantoprazole (Protonix) 40 MG EC tablet, Take 1 tablet by mouth Daily., Disp: 90 tablet, Rfl: 3    PARoxetine (PAXIL) 20 MG tablet, 1 tablet., Disp: , Rfl:     SUMAtriptan (Imitrex) 100 MG tablet, Take one tablet at onset of headache. May repeat dose one time in 2 hours if headache not relieved., Disp: 9 tablet, Rfl: 5    Tirzepatide-Weight Management (ZEPBOUND) 5 MG/0.5ML solution auto-injector, Inject 0.5 mL under the skin into the appropriate area as directed 1 (One) Time Per Week., Disp: 2 mL, Rfl: 2    Vraylar 1.5 MG capsule capsule, 1 capsule., Disp: , Rfl:     fluticasone (FLONASE) 50 MCG/ACT nasal spray, Administer 2 sprays into the nostril(s) as directed by provider Daily., Disp: 48 g, Rfl: 3    topiramate (Topamax) 50 MG tablet, Take 1.5 tablets by mouth 2 (Two) Times a Day., Disp: 135 tablet, Rfl: 3    Allergies:   No Known Allergies    Objective     Physical Exam: Please see above  Vital Signs:   Vitals:    04/14/25 1032   BP: 125/84   BP Location: Left arm   Patient Position: Sitting   Cuff Size: Adult   Pulse: 84   Temp: 98.1 °F (36.7 °C)   TempSrc: Temporal   SpO2: 97%   Weight: 75.1 kg (165 lb 9.6 oz)   Height: 167.6 cm (66\")   PainSc: 0-No pain     Body mass index is 26.73 kg/m².          Physical Exam  Constitutional:       General: She is not in acute distress.     " Appearance: Normal appearance. She is not ill-appearing.      Comments: Pleasant, healthy, NAD, BMI 26.7 reflecting a 5 pound weight loss in the last month, and just over 200 pound weight loss since undergoing sleeve gastrectomy in 2020.   HENT:      Right Ear: Tympanic membrane and ear canal normal.      Left Ear: Tympanic membrane and ear canal normal.      Nose: Congestion present. No rhinorrhea.      Mouth/Throat:      Mouth: Mucous membranes are moist.      Pharynx: Oropharynx is clear. No oropharyngeal exudate or posterior oropharyngeal erythema.   Eyes:      Conjunctiva/sclera: Conjunctivae normal.   Cardiovascular:      Rate and Rhythm: Normal rate and regular rhythm.      Heart sounds: Normal heart sounds. No murmur heard.     No friction rub. No gallop.   Pulmonary:      Effort: Pulmonary effort is normal. No respiratory distress.      Breath sounds: Normal breath sounds. No stridor. No wheezing, rhonchi or rales.   Musculoskeletal:      Cervical back: No rigidity or tenderness.   Lymphadenopathy:      Cervical: No cervical adenopathy.   Skin:     General: Skin is dry.   Neurological:      General: No focal deficit present.      Mental Status: She is alert.   Psychiatric:         Mood and Affect: Mood normal.         Procedures    Results:   Labs:   Hemoglobin A1C   Date Value Ref Range Status   12/13/2024 5.6 4.8 - 5.6 % Final     Comment:              Prediabetes: 5.7 - 6.4           Diabetes: >6.4           Glycemic control for adults with diabetes: <7.0       TSH   Date Value Ref Range Status   12/13/2024 1.600 0.450 - 4.500 uIU/mL Final        POCT Results (if applicable):   Results for orders placed or performed in visit on 03/13/25   POC Hemoglobin    Collection Time: 03/13/25  8:37 AM    Specimen: Blood   Result Value Ref Range    Hemoglobin 11.8 (A) 12.0 - 17.0 g/dL    Lot Number [id number]     Expiration Date 08/20/2025          Assessment / Plan      Assessment/Plan:   Diagnoses and all orders  for this visit:    1. Overweight (BMI 25.0-29.9) (Primary)  Assessment & Plan:  Status post sleeve gastrectomy in 8/2020, having lost per her account well over 100 pounds at that time, subsequently having regained 40 pounds of weight.  Started in 11/2024 on Zepbound 2.5 mg subcu weekly, most recently months ago having an increase in her Zepbound from 5 mg to 7.5 mg subcu weekly, also getting benefit from Topamax prescribed for migraine suppression..  She has lost another 11 pounds in weight, getting essentially down to her stated optimal weight both subjectively as well as from an medical standpoint.  She is having some mild positional lightheadedness which likely reflects her ongoing significant weight loss.  2 months ago Zepbound 7.5 mg was reduced to 5 mg subcu weekly, having another 5 pounds of weight loss over the last month, and a greater than 200 pound weight loss since undergoing sleeve gastrectomy in 2020.  We discussed option of further reducing Zepbound versus continue current regimen.  Patient would like to continue current regimen which I think is reasonable while also pursuing healthy lifestyle with diet and exercise, also benefiting with use of Topamax for migraine headaches regarding her weight loss, and we will reassess in 3 months.    Orders:  -     Tirzepatide-Weight Management (ZEPBOUND) 5 MG/0.5ML solution auto-injector; Inject 0.5 mL under the skin into the appropriate area as directed 1 (One) Time Per Week.  Dispense: 2 mL; Refill: 2    2. Seasonal allergic rhinitis due to pollen  Assessment & Plan:  Continue loratadine and add Flonase as needed.  Advise if not helping.    Orders:  -     fluticasone (FLONASE) 50 MCG/ACT nasal spray; Administer 2 sprays into the nostril(s) as directed by provider Daily.  Dispense: 48 g; Refill: 3    3. Migraine with aura and without status migrainosus, not intractable  Assessment & Plan:  History of longstanding migraine headaches, having Topamax suppressive  therapy discontinued during pregnancy last year.  She was started back on Topamax suppression in 12/2024, currently taking 50 mg nightly.  Patient indicates in general her migraine pattern has improved with reinitiating the Topamax but she still now having headaches on a weekly basis that are not being completely aborted with use of Imitrex.  Plan increase Topamax to 75 mg nightly reassessing clinically at follow-up visit in 3 months.  If still not having sufficient control we will then increase further to 100 mg nightly.  Advise if problems in the interim, patient being well versed in potential side effects.       4. Other iron deficiency anemia  Assessment & Plan:  History of menorrhagia with secondary iron deficiency no evidence of B12 or folic acid deficiency per lab testing in 12/2024, originally had a hemoglobin of 8.4 in 8/2024, and 10.9 in 12/2024, 11.8 in 3/2025.  Currently maintained on iron supplement, plan to repeat hemoglobin at follow-up visit.      Other orders  -     topiramate (Topamax) 50 MG tablet; Take 1.5 tablets by mouth 2 (Two) Times a Day.  Dispense: 135 tablet; Refill: 3        Vaccine Counseling:      Follow Up:   Return in about 3 months (around 7/14/2025) for Recheck.      At [facility], we believe that sharing information builds trust and better relationships. You are receiving this note because you recently visited [facility]. It is possible you will see health information before a provider has talked with you about it. This kind of information can be easy to misunderstand. To help you fully understand what it means for your health, we urge you to discuss this note with your provider.    Harley Rodriguez MD  [facility]

## 2025-05-12 ENCOUNTER — OFFICE VISIT (OUTPATIENT)
Dept: FAMILY MEDICINE CLINIC | Facility: CLINIC | Age: 40
End: 2025-05-12
Payer: COMMERCIAL

## 2025-05-12 ENCOUNTER — TELEPHONE (OUTPATIENT)
Dept: OBSTETRICS AND GYNECOLOGY | Facility: CLINIC | Age: 40
End: 2025-05-12
Payer: COMMERCIAL

## 2025-05-12 VITALS
HEART RATE: 82 BPM | DIASTOLIC BLOOD PRESSURE: 78 MMHG | SYSTOLIC BLOOD PRESSURE: 114 MMHG | WEIGHT: 161.4 LBS | OXYGEN SATURATION: 99 % | BODY MASS INDEX: 25.94 KG/M2 | HEIGHT: 66 IN | TEMPERATURE: 98.2 F

## 2025-05-12 DIAGNOSIS — J30.1 SEASONAL ALLERGIC RHINITIS DUE TO POLLEN: ICD-10-CM

## 2025-05-12 DIAGNOSIS — H69.92 DYSFUNCTION OF LEFT EUSTACHIAN TUBE: Primary | ICD-10-CM

## 2025-05-12 PROCEDURE — 99214 OFFICE O/P EST MOD 30 MIN: CPT | Performed by: INTERNAL MEDICINE

## 2025-05-12 RX ORDER — PREDNISONE 20 MG/1
20 TABLET ORAL DAILY
Qty: 3 TABLET | Refills: 0 | Status: SHIPPED | OUTPATIENT
Start: 2025-05-12 | End: 2025-05-15

## 2025-05-12 NOTE — PROGRESS NOTES
Follow Up Office Visit      Date: 2025   Patient Name: Ely De La Garza  : 1985   MRN: [id number]     Chief Complaint:    Chief Complaint   Patient presents with    Earache     Echoing in ear       History of Present Illness: Ely De La Garza is a 39 y.o. female who is here today for 2 weeks history of some intermittent crackling sensation from her left ear, and 2 days of a more persistent discomfort in the ear.  Minimal nasal congestion with no rhinorrhea, right ear uninvolved, slight scratchy throat with mild headache, no cough wheeze or dyspnea.  Taking Claritin but not currently taking Flonase for allergic rhinitis.  Patient also has a history of obesity status post sleeve gastrectomy, marked weight loss, currently on Zepbound having lost another 4 pounds of weight since her last visit 1 month ago, pursuing healthy lifestyle with diet and exercise.    Subjective      Review of Systems:   Review of Systems    I have reviewed the patients family history, social history, past medical history, past surgical history and have updated it as appropriate.     Medications:     Current Outpatient Medications:     Cholecalciferol (Vitamin D) 50 MCG (2000 UT) tablet, Take 1 tablet by mouth Daily., Disp: 90 tablet, Rfl: 3    ferrous sulfate 325 (65 FE) MG tablet, Take 1 tablet by mouth Daily With Breakfast. Take with vitamin C 250-500 mg once daily, Disp: 90 tablet, Rfl: 1    fluticasone (FLONASE) 50 MCG/ACT nasal spray, Administer 2 sprays into the nostril(s) as directed by provider Daily., Disp: 48 g, Rfl: 3    loratadine (Claritin) 10 MG tablet, Take 1 tablet by mouth Daily., Disp: 90 tablet, Rfl: 3    ondansetron ODT (ZOFRAN-ODT) 4 MG disintegrating tablet, Place 1 tablet on the tongue Every 8 (Eight) Hours As Needed for Vomiting or Nausea., Disp: 20 tablet, Rfl: 0    pantoprazole (Protonix) 40 MG EC tablet, Take 1 tablet by mouth Daily., Disp: 90 tablet, Rfl: 3    PARoxetine (PAXIL) 20 MG  "tablet, 1 tablet., Disp: , Rfl:     SUMAtriptan (Imitrex) 100 MG tablet, Take one tablet at onset of headache. May repeat dose one time in 2 hours if headache not relieved., Disp: 9 tablet, Rfl: 5    Tirzepatide-Weight Management (ZEPBOUND) 5 MG/0.5ML solution auto-injector, Inject 0.5 mL under the skin into the appropriate area as directed 1 (One) Time Per Week., Disp: 2 mL, Rfl: 2    topiramate (Topamax) 50 MG tablet, Take 1.5 tablets by mouth 2 (Two) Times a Day., Disp: 135 tablet, Rfl: 3    Vraylar 1.5 MG capsule capsule, 1 capsule., Disp: , Rfl:     predniSONE (DELTASONE) 20 MG tablet, Take 1 tablet by mouth Daily for 3 days., Disp: 3 tablet, Rfl: 0    Allergies:   No Known Allergies    Objective     Physical Exam: Please see above  Vital Signs:   Vitals:    05/12/25 1424   BP: 114/78   BP Location: Left arm   Patient Position: Sitting   Cuff Size: Adult   Pulse: 82   Temp: 98.2 °F (36.8 °C)   TempSrc: Temporal   SpO2: 99%   Weight: 73.2 kg (161 lb 6.4 oz)   Height: 167.6 cm (66\")     Body mass index is 26.05 kg/m².          Physical Exam  Constitutional:       General: She is not in acute distress.     Appearance: Normal appearance. She is not ill-appearing.      Comments: Pleasant, healthy, NAD, BMI 26.0 reflecting a 4 pound weight loss in the last month   HENT:      Right Ear: Tympanic membrane and ear canal normal.      Left Ear: Tympanic membrane and ear canal normal.      Nose: Congestion present. No rhinorrhea.      Mouth/Throat:      Mouth: Mucous membranes are moist.      Pharynx: Oropharynx is clear. No oropharyngeal exudate or posterior oropharyngeal erythema.   Cardiovascular:      Rate and Rhythm: Normal rate and regular rhythm.      Heart sounds: Normal heart sounds. No murmur heard.     No friction rub. No gallop.   Pulmonary:      Effort: Pulmonary effort is normal. No respiratory distress.      Breath sounds: Normal breath sounds.   Musculoskeletal:      Cervical back: No rigidity or " tenderness.   Lymphadenopathy:      Cervical: No cervical adenopathy.   Neurological:      General: No focal deficit present.      Mental Status: She is alert.   Psychiatric:         Mood and Affect: Mood normal.         Procedures    Results:   Labs:   Hemoglobin A1C   Date Value Ref Range Status   12/13/2024 5.6 4.8 - 5.6 % Final     Comment:              Prediabetes: 5.7 - 6.4           Diabetes: >6.4           Glycemic control for adults with diabetes: <7.0       TSH   Date Value Ref Range Status   12/13/2024 1.600 0.450 - 4.500 uIU/mL Final        POCT Results (if applicable):   Results for orders placed or performed in visit on 03/13/25   POC Hemoglobin    Collection Time: 03/13/25  8:37 AM    Specimen: Blood   Result Value Ref Range    Hemoglobin 11.8 (A) 12.0 - 17.0 g/dL    Lot Number [id number]     Expiration Date 08/20/2025          Assessment / Plan      Assessment/Plan:   Diagnoses and all orders for this visit:    1. Dysfunction of left eustachian tube (Primary)  Assessment & Plan:  Patient having 2 days of left otalgia, with normal objective exam, clinical picture consistent with eustachian tube dysfunction [place] secondary to her allergic rhinitis.  Treating with ongoing use of loratadine, adding Flonase, and brief course of prednisone.  Advised if symptoms not resolving over the next several days or for any acute worsening of symptoms in the interim    Orders:  -     predniSONE (DELTASONE) 20 MG tablet; Take 1 tablet by mouth Daily for 3 days.  Dispense: 3 tablet; Refill: 0    2. Seasonal allergic rhinitis due to pollen  Assessment & Plan:  Symptomatic seasonal allergic rhinitis, on loratadine 10 mg daily, reinitiating previous prescribed Flonase at 1 to 2 sprays each nostril daily as needed, adding brief course of prednisone given secondary eustachian tube dysfunction.  Advised symptoms not resolving with treatment or for any acute worsening of symptoms in the interim.    Orders:  -     predniSONE  (DELTASONE) 20 MG tablet; Take 1 tablet by mouth Daily for 3 days.  Dispense: 3 tablet; Refill: 0        Vaccine Counseling:      Follow Up:   Return for Next scheduled follow up.      At [facility], we believe that sharing information builds trust and better relationships. You are receiving this note because you recently visited [facility]. It is possible you will see health information before a provider has talked with you about it. This kind of information can be easy to misunderstand. To help you fully understand what it means for your health, we urge you to discuss this note with your provider.    Harley Rodriguez MD  [facility]

## 2025-05-12 NOTE — ASSESSMENT & PLAN NOTE
Patient having 2 days of left otalgia, with normal objective exam, clinical picture consistent with eustachian tube dysfunction [place] secondary to her allergic rhinitis.  Treating with ongoing use of loratadine, adding Flonase, and brief course of prednisone.  Advised if symptoms not resolving over the next several days or for any acute worsening of symptoms in the interim

## 2025-05-12 NOTE — TELEPHONE ENCOUNTER
Provider: Huong Dwyer APRN     Caller: Ely De La Garza  Female, 39 y.o., 1985  MRN: [id number]  [facility]: [id number]  Phone: [phone]    Relationship to Patient: SELF        Reason for Call: PT REQ A MIRENA IUD ORDER BE PLACED SO THAT SHE CAN MAKE AN APPT FOR INSERTION AFTER IT IS RECEIVED AT THE PRACTICE    PT REQ A CALL BACK TO DISCUSS

## 2025-05-12 NOTE — ASSESSMENT & PLAN NOTE
Symptomatic seasonal allergic rhinitis, on loratadine 10 mg daily, reinitiating previous prescribed Flonase at 1 to 2 sprays each nostril daily as needed, adding brief course of prednisone given secondary eustachian tube dysfunction.  Advised symptoms not resolving with treatment or for any acute worsening of symptoms in the interim.

## 2025-05-14 ENCOUNTER — OFFICE VISIT (OUTPATIENT)
Dept: OBSTETRICS AND GYNECOLOGY | Facility: CLINIC | Age: 40
End: 2025-05-14
Payer: COMMERCIAL

## 2025-05-14 VITALS
RESPIRATION RATE: 18 BRPM | HEIGHT: 66 IN | SYSTOLIC BLOOD PRESSURE: 118 MMHG | DIASTOLIC BLOOD PRESSURE: 84 MMHG | WEIGHT: 161 LBS | BODY MASS INDEX: 25.88 KG/M2

## 2025-05-14 DIAGNOSIS — Z30.430 ENCOUNTER FOR IUD INSERTION: Primary | ICD-10-CM

## 2025-05-14 DIAGNOSIS — Z30.431 IUD CHECK UP: ICD-10-CM

## 2025-05-14 NOTE — PROGRESS NOTES
"     Gynecologic Procedure Note        Procedure: IUD Insertion     Procedures    Pre procedure indication 1) Desires Mirena  Post procedure indication 1) Desires Mirena    NDC: Mirena 95823-974-53  Lot #: [id number]  Exp Date: 05/2027  BH device    /84   Resp 18   Ht 167.6 cm (65.98\")   Wt 73 kg (161 lb)   LMP 05/09/2025 (Exact Date)   BMI 26.00 kg/m²       The risks, benefits, and alternatives to Mirena were explained at length with the patient. All her questions were answered and consents were signed.  Her LMP was 05/09/2025 .  Urine Pregnancy Test was Negative.  Patient does not have an allergy to betadine or shellfish.    Time out: immediate members of the procedure team and patient agree to the following: correct patient, correct site, correct procedure to be performed.      The patient was placed in a dorsal lithotomy position on the examining table in stirrups. A bimanual exam confirmed the uterus was anteverted. A speculum was inserted into the vagina and the cervix was brought into view.  The cervix was prepped with Betadine. The anterior lip of the cervix was then grasped with a single-tooth tenaculum. The endometrial cavity was then sounded to 7 centimeters. The sealed Mirena package was opened and the IUD was removed in a sterile fashion.    The upper edge of the depth setting the flange was set at the uterine sound measurement. The  was then carefully advanced to the cervical canal into the uterus to the level of the fundus.  The slider was then retracted about 1 cm and deployed the device. The device was then gently advanced to the fundus. The IUD was then released by pulling the slider down all the way. The  was removed carefully from the uterus. The threads were then cut leaving 2-3 cm visible outside of the cervix.  The single-tooth tenaculum was removed from the anterior lip. Good hemostasis was noted.   All other instruments were removed from the vagina.       The patient " tolerated the procedure very well with a mild amount of discomfort.  She was monitored for 10 minutes prior to discharge.      There were no complications.    The patient was counseled about the need to return in 4 weeks for IUD check.     She was counseled about the need to use a backup method of contraception such as condoms until her post insertion exam was performed. The patient verbalized understanding when the IUD will need to be removed/replaced. Written information was given to the patient.  The patient is counseled to contact us if she has any significant or increasing bleeding, pain, fever, chills, or other concerns. She is instructed to see a doctor right away if she believes that she may be pregnant at any time with the IUD in place.    Return in about 4 weeks (around 6/11/2025) for US IUD check.      Huong Dwyer, APRN  05/14/2025

## 2025-05-19 ENCOUNTER — TELEPHONE (OUTPATIENT)
Dept: FAMILY MEDICINE CLINIC | Facility: CLINIC | Age: 40
End: 2025-05-19
Payer: COMMERCIAL

## 2025-05-19 NOTE — TELEPHONE ENCOUNTER
Patient called stating that she has had a headache for 8 hours, she takes topiramate daily, her sumatriptan and zofran are not helping with the headache and throwing up. Spoke with Dr fields and he advised me to tell her that if she could not tolerated the headache to go to the ER. Patient verbalized understanding

## 2025-05-27 ENCOUNTER — OFFICE VISIT (OUTPATIENT)
Dept: FAMILY MEDICINE CLINIC | Facility: CLINIC | Age: 40
End: 2025-05-27
Payer: COMMERCIAL

## 2025-05-27 VITALS
TEMPERATURE: 98.6 F | BODY MASS INDEX: 26.13 KG/M2 | SYSTOLIC BLOOD PRESSURE: 120 MMHG | WEIGHT: 162.6 LBS | HEIGHT: 66 IN | DIASTOLIC BLOOD PRESSURE: 76 MMHG | HEART RATE: 84 BPM | OXYGEN SATURATION: 99 %

## 2025-05-27 DIAGNOSIS — J06.9 VIRAL URI WITH COUGH: Primary | ICD-10-CM

## 2025-05-27 PROCEDURE — 99213 OFFICE O/P EST LOW 20 MIN: CPT | Performed by: INTERNAL MEDICINE

## 2025-05-27 RX ORDER — BROMPHENIRAMINE MALEATE, PSEUDOEPHEDRINE HYDROCHLORIDE, AND DEXTROMETHORPHAN HYDROBROMIDE 2; 30; 10 MG/5ML; MG/5ML; MG/5ML
SYRUP ORAL
Qty: 60 ML | Refills: 0 | Status: SHIPPED | OUTPATIENT
Start: 2025-05-27 | End: 2025-05-27

## 2025-05-27 RX ORDER — BROMPHENIRAMINE MALEATE, PSEUDOEPHEDRINE HYDROCHLORIDE, AND DEXTROMETHORPHAN HYDROBROMIDE 2; 30; 10 MG/5ML; MG/5ML; MG/5ML
SYRUP ORAL
Qty: 240 ML | Refills: 0 | Status: SHIPPED | OUTPATIENT
Start: 2025-05-27

## 2025-05-27 NOTE — PROGRESS NOTES
Answers submitted by the patient for this visit:  Cough Questionnaire (Submitted on 2025)  Chief Complaint: Cough  Chronicity: new  Onset: in the past 7 days  Progression since onset: worsening  Frequency: daily  Cough characteristics: productive of yellow sputum  ear congestion: Yes  nasal congestion: Yes  Aggravated by: nothing      Follow Up Office Visit      Date: 2025   Patient Name: Ely De La Garza  : 1985   MRN: [id number]     Chief Complaint:    Chief Complaint   Patient presents with    Sinusitis       History of Present Illness: Ely De La Garza is a 39 y.o. female who is here today for onset 3 days ago of fatigue malaise nasal congestion with clear rhinorrhea occasionally blood-tinged, sinus pressure, congested cough, sore throat, headache, with no GI symptoms, no fevers or chills..  Son has had similar symptoms.    Subjective      Review of Systems:   Review of Systems    I have reviewed the patients family history, social history, past medical history, past surgical history and have updated it as appropriate.     Medications:     Current Outpatient Medications:     Cholecalciferol (Vitamin D) 50 MCG (2000 UT) tablet, Take 1 tablet by mouth Daily., Disp: 90 tablet, Rfl: 3    ferrous sulfate 325 (65 FE) MG tablet, Take 1 tablet by mouth Daily With Breakfast. Take with vitamin C 250-500 mg once daily, Disp: 90 tablet, Rfl: 1    fluticasone (FLONASE) 50 MCG/ACT nasal spray, Administer 2 sprays into the nostril(s) as directed by provider Daily., Disp: 48 g, Rfl: 3    loratadine (Claritin) 10 MG tablet, Take 1 tablet by mouth Daily., Disp: 90 tablet, Rfl: 3    ondansetron ODT (ZOFRAN-ODT) 4 MG disintegrating tablet, Place 1 tablet on the tongue Every 8 (Eight) Hours As Needed for Vomiting or Nausea., Disp: 20 tablet, Rfl: 0    pantoprazole (Protonix) 40 MG EC tablet, Take 1 tablet by mouth Daily., Disp: 90 tablet, Rfl: 3    PARoxetine (PAXIL) 20 MG tablet, 1 tablet., Disp: , Rfl:  "    SUMAtriptan (Imitrex) 100 MG tablet, Take one tablet at onset of headache. May repeat dose one time in 2 hours if headache not relieved., Disp: 9 tablet, Rfl: 5    Tirzepatide-Weight Management (ZEPBOUND) 5 MG/0.5ML solution auto-injector, Inject 0.5 mL under the skin into the appropriate area as directed 1 (One) Time Per Week., Disp: 2 mL, Rfl: 2    topiramate (Topamax) 50 MG tablet, Take 1.5 tablets by mouth 2 (Two) Times a Day., Disp: 135 tablet, Rfl: 3    Vraylar 1.5 MG capsule capsule, 1 capsule., Disp: , Rfl:     brompheniramine-pseudoephedrine-DM 30-2-10 MG/5ML syrup, 10 mL every 4 hours as needed for cold and cough symptoms, Disp: 240 mL, Rfl: 0    Allergies:   No Known Allergies    Objective     Physical Exam: Please see above  Vital Signs:   Vitals:    05/27/25 1505   BP: 120/76   BP Location: Left arm   Patient Position: Sitting   Cuff Size: Adult   Pulse: 84   Temp: 98.6 °F (37 °C)   TempSrc: Temporal   SpO2: 99%   Weight: 73.8 kg (162 lb 9.6 oz)   Height: 167.6 cm (65.98\")     Body mass index is 26.26 kg/m².          Physical Exam  Constitutional:       General: She is not in acute distress.     Appearance: Normal appearance. She is not ill-appearing.      Comments: Very pleasant, healthy, NAD, BMI 26.2   HENT:      Right Ear: Tympanic membrane and ear canal normal.      Left Ear: Tympanic membrane and ear canal normal.      Nose: Congestion present. No rhinorrhea.      Comments: Mild sinus pressure discomfort to palpation     Mouth/Throat:      Mouth: Mucous membranes are moist.      Pharynx: Posterior oropharyngeal erythema present. No oropharyngeal exudate.      Comments: Mild posterior erythema with cobblestoning, no exudate or petechiae  Cardiovascular:      Rate and Rhythm: Normal rate and regular rhythm.      Heart sounds: Normal heart sounds. No murmur heard.     No friction rub. No gallop.   Pulmonary:      Effort: Pulmonary effort is normal. No respiratory distress.      Breath sounds: " Normal breath sounds. No stridor. No wheezing, rhonchi or rales.   Musculoskeletal:      Cervical back: No rigidity or tenderness.   Lymphadenopathy:      Cervical: No cervical adenopathy.   Skin:     General: Skin is warm and dry.   Neurological:      General: No focal deficit present.      Mental Status: She is alert.   Psychiatric:         Mood and Affect: Mood normal.         Procedures    Results:   Labs:   Hemoglobin A1C   Date Value Ref Range Status   12/13/2024 5.6 4.8 - 5.6 % Final     Comment:              Prediabetes: 5.7 - 6.4           Diabetes: >6.4           Glycemic control for adults with diabetes: <7.0       TSH   Date Value Ref Range Status   12/13/2024 1.600 0.450 - 4.500 uIU/mL Final        POCT Results (if applicable):   Results for orders placed or performed in visit on 03/13/25   POC Hemoglobin    Collection Time: 03/13/25  8:37 AM    Specimen: Blood   Result Value Ref Range    Hemoglobin 11.8 (A) 12.0 - 17.0 g/dL    Lot Number [id number]     Expiration Date 08/20/2025        Imaging:   No valid procedures specified.       Assessment / Plan      Assessment/Plan:   Diagnoses and all orders for this visit:    1. Viral URI with cough (Primary)  Assessment & Plan:  Clinical picture most consistent with a viral URI, for which we will treat empirically with Bromfed-DM along with plenty fluids Motrin or Tylenol and rest.  If symptoms not improving over the next 5 to 7 days or for any acute worsening of symptoms in the interim, advised accordingly.    Orders:  -     Discontinue: brompheniramine-pseudoephedrine-DM 30-2-10 MG/5ML syrup; 10 mg orally every 4 hours as needed for cold and cough symptoms  Dispense: 60 mL; Refill: 0  -     brompheniramine-pseudoephedrine-DM 30-2-10 MG/5ML syrup; 10 mL every 4 hours as needed for cold and cough symptoms  Dispense: 240 mL; Refill: 0        Vaccine Counseling:      Follow Up:   Return for Next scheduled follow up.      At [facility], we believe that sharing  information builds trust and better relationships. You are receiving this note because you recently visited [facility]. It is possible you will see health information before a provider has talked with you about it. This kind of information can be easy to misunderstand. To help you fully understand what it means for your health, we urge you to discuss this note with your provider.    Harley Rodriguez MD  [facility] [place]

## 2025-05-27 NOTE — ASSESSMENT & PLAN NOTE
Clinical picture most consistent with a viral URI, for which we will treat empirically with Bromfed-DM along with plenty fluids Motrin or Tylenol and rest.  If symptoms not improving over the next 5 to 7 days or for any acute worsening of symptoms in the interim, advised accordingly.

## 2025-06-07 DIAGNOSIS — D50.0 IRON DEFICIENCY ANEMIA DUE TO CHRONIC BLOOD LOSS: ICD-10-CM

## 2025-06-09 RX ORDER — FERROUS SULFATE 325(65) MG
TABLET ORAL
Qty: 90 TABLET | Refills: 0 | Status: SHIPPED | OUTPATIENT
Start: 2025-06-09

## 2025-06-16 ENCOUNTER — TELEPHONE (OUTPATIENT)
Dept: FAMILY MEDICINE CLINIC | Facility: CLINIC | Age: 40
End: 2025-06-16
Payer: COMMERCIAL

## 2025-06-16 NOTE — TELEPHONE ENCOUNTER
I have done a PA on her rx zepbound.   This rx has been approved. I have left her a vm regarding this. TF

## 2025-06-29 DIAGNOSIS — E66.3 OVERWEIGHT (BMI 25.0-29.9): ICD-10-CM

## 2025-06-30 RX ORDER — TIRZEPATIDE 5 MG/.5ML
INJECTION, SOLUTION SUBCUTANEOUS
Qty: 4 ML | Refills: 0 | Status: SHIPPED | OUTPATIENT
Start: 2025-06-30

## 2025-07-03 RX ORDER — ALBENDAZOLE 200 MG/1
TABLET, FILM COATED ORAL
Qty: 4 TABLET | Refills: 0 | Status: SHIPPED | OUTPATIENT
Start: 2025-07-03

## 2025-07-14 ENCOUNTER — OFFICE VISIT (OUTPATIENT)
Dept: FAMILY MEDICINE CLINIC | Facility: CLINIC | Age: 40
End: 2025-07-14
Payer: COMMERCIAL

## 2025-07-14 VITALS
BODY MASS INDEX: 24.49 KG/M2 | HEIGHT: 66 IN | DIASTOLIC BLOOD PRESSURE: 74 MMHG | OXYGEN SATURATION: 98 % | HEART RATE: 71 BPM | TEMPERATURE: 98.1 F | SYSTOLIC BLOOD PRESSURE: 122 MMHG | WEIGHT: 152.4 LBS

## 2025-07-14 DIAGNOSIS — E66.3 OVERWEIGHT (BMI 25.0-29.9): Primary | ICD-10-CM

## 2025-07-14 DIAGNOSIS — R11.0 NAUSEA: ICD-10-CM

## 2025-07-14 DIAGNOSIS — D50.8 OTHER IRON DEFICIENCY ANEMIA: ICD-10-CM

## 2025-07-14 DIAGNOSIS — G43.109 MIGRAINE WITH AURA AND WITHOUT STATUS MIGRAINOSUS, NOT INTRACTABLE: ICD-10-CM

## 2025-07-14 LAB
EXPIRATION DATE: NORMAL
HGB BLDA-MCNC: 14.5 G/DL (ref 12–17)
Lab: NORMAL

## 2025-07-14 PROCEDURE — 85018 HEMOGLOBIN: CPT | Performed by: INTERNAL MEDICINE

## 2025-07-14 PROCEDURE — 99214 OFFICE O/P EST MOD 30 MIN: CPT | Performed by: INTERNAL MEDICINE

## 2025-07-14 RX ORDER — ONDANSETRON 4 MG/1
4 TABLET, ORALLY DISINTEGRATING ORAL EVERY 8 HOURS PRN
Qty: 20 TABLET | Refills: 0 | Status: SHIPPED | OUTPATIENT
Start: 2025-07-14

## 2025-07-14 RX ORDER — TOPIRAMATE 50 MG/1
100 TABLET, FILM COATED ORAL NIGHTLY
Qty: 135 TABLET | Refills: 3 | Status: SHIPPED | OUTPATIENT
Start: 2025-07-14

## 2025-07-14 RX ORDER — TOPIRAMATE 50 MG/1
75 TABLET, FILM COATED ORAL NIGHTLY
Qty: 135 TABLET | Refills: 3 | Status: SHIPPED | OUTPATIENT
Start: 2025-07-14 | End: 2025-07-14

## 2025-07-14 NOTE — ASSESSMENT & PLAN NOTE
Does get nausea when she gets her migraine headaches typically on a weekly basis for which we will refill Zofran, and for which we are also increasing her Topamax from 75 mg up to 100 mg nightly

## 2025-07-14 NOTE — PROGRESS NOTES
Follow Up Office Visit      Date: 2025   Patient Name: Ely De La Garza  : 1985   MRN: [id number]     Chief Complaint:    Chief Complaint   Patient presents with    Weight Management       History of Present Illness: Ely De La Garza is a 39 y.o. female who is here today for follow-up of her obesity improved overweight, for which she had a sleeve gastrectomy several years prior, then has been on Zepbound therapy, months but having had a reduction in her Zepbound from 7.5 mg to 5 mg subcu weekly, continue to have now another 10 pound weight loss in the last 3 months, with a BMI now of 24.6.  She is following healthy diet and is physically active.  She also gets benefit of appetite suppression taking Topamax, currently taking 75 mg nightly for migraine suppression.  Notes she still is having at least weekly migraines though slightly less severe.  No side effects of the Topamax.  She will get some nausea periodically from the migraines.  She also has a history of menometrorrhagia causing secondary anemia with last hemoglobin 11.8 now 3 months prior, on iron supplement, having had Mirena placed 2 months ago having reduction in the severity of her menstrual bleeding but Nix standard bleeding time of up to 2 weeks.  She follows up with her gynecologist in 10/2024.    Subjective      Review of Systems:   Review of Systems    I have reviewed the patients family history, social history, past medical history, past surgical history and have updated it as appropriate.     Medications:     Current Outpatient Medications:     albendazole (Albenza) 200 MG tablet, Take two tablets by mouth x one dose. In two weeks repeat dose x 1, Disp: 4 tablet, Rfl: 0    Cholecalciferol (Vitamin D) 50 MCG ( UT) tablet, Take 1 tablet by mouth Daily., Disp: 90 tablet, Rfl: 3    fluticasone (FLONASE) 50 MCG/ACT nasal spray, Administer 2 sprays into the nostril(s) as directed by provider Daily., Disp: 48 g, Rfl: 3     "loratadine (Claritin) 10 MG tablet, Take 1 tablet by mouth Daily., Disp: 90 tablet, Rfl: 3    ondansetron ODT (ZOFRAN-ODT) 4 MG disintegrating tablet, Place 1 tablet on the tongue Every 8 (Eight) Hours As Needed for Vomiting or Nausea., Disp: 20 tablet, Rfl: 0    pantoprazole (Protonix) 40 MG EC tablet, Take 1 tablet by mouth Daily., Disp: 90 tablet, Rfl: 3    PARoxetine (PAXIL) 20 MG tablet, 1 tablet., Disp: , Rfl:     SUMAtriptan (Imitrex) 100 MG tablet, Take one tablet at onset of headache. May repeat dose one time in 2 hours if headache not relieved., Disp: 9 tablet, Rfl: 5    SV Iron 325 (65 Fe) MG tablet, TAKE 1 TABLET BY MOUTH ONCE DAILY WITH BREAKFAST. TAKE VITAMIN C 250-500 MG ONCE DAILY., Disp: 90 tablet, Rfl: 0    topiramate (Topamax) 50 MG tablet, Take 2 tablets by mouth Every Night., Disp: 135 tablet, Rfl: 3    Vraylar 1.5 MG capsule capsule, 1 capsule., Disp: , Rfl:     Tirzepatide-Weight Management (ZEPBOUND) 2.5 MG/0.5ML solution auto-injector, Inject 0.5 mL under the skin into the appropriate area as directed 1 (One) Time Per Week., Disp: 2 mL, Rfl: 2    Allergies:   No Known Allergies    Objective     Physical Exam: Please see above  Vital Signs:   Vitals:    07/14/25 1009   BP: 122/74   BP Location: Left arm   Patient Position: Sitting   Cuff Size: Adult   Pulse: 71   Temp: 98.1 °F (36.7 °C)   TempSrc: Temporal   SpO2: 98%   Weight: 69.1 kg (152 lb 6.4 oz)   Height: 167.6 cm (65.98\")     Body mass index is 24.61 kg/m².  BMI is within normal parameters. No other follow-up for BMI required.       Physical Exam  Constitutional:       Appearance: Normal appearance. She is normal weight.      Comments: Pleasant, healthy, NAD, BMI 24.6 reflecting another 10 pound weight loss over the last 3 months   Cardiovascular:      Rate and Rhythm: Normal rate and regular rhythm.      Heart sounds: Normal heart sounds. No murmur heard.     No friction rub. No gallop.   Pulmonary:      Effort: Pulmonary effort is " normal. No respiratory distress.      Breath sounds: Normal breath sounds.   Musculoskeletal:      Cervical back: No rigidity or tenderness.   Lymphadenopathy:      Cervical: No cervical adenopathy.   Neurological:      General: No focal deficit present.      Mental Status: She is alert.   Psychiatric:         Mood and Affect: Mood normal.         Procedures    Results:   Labs:   Hemoglobin A1C   Date Value Ref Range Status   12/13/2024 5.6 4.8 - 5.6 % Final     Comment:              Prediabetes: 5.7 - 6.4           Diabetes: >6.4           Glycemic control for adults with diabetes: <7.0       TSH   Date Value Ref Range Status   12/13/2024 1.600 0.450 - 4.500 uIU/mL Final        POCT Results (if applicable):   Results for orders placed or performed in visit on 07/14/25   POC Hemoglobin    Collection Time: 07/14/25 10:57 AM    Specimen: Blood   Result Value Ref Range    Hemoglobin 14.5 12.0 - 17.0 g/dL    Lot Number [id number]     Expiration Date 08/20/2025        Assessment / Plan      Assessment/Plan:   Diagnoses and all orders for this visit:    1. Overweight (BMI 25.0-29.9) (Primary)  Assessment & Plan:  Status post sleeve gastrectomy in 8/2020, having lost per her account well over 100 pounds at that time, subsequently having regained 40 pounds of weight.  Started in 11/2024 on Zepbound 2.5 mg subcu weekly, most recently months ago having an increase in her Zepbound from 5 mg to 7.5 mg subcu weekly, also getting benefit from Topamax prescribed for migraine suppression..  She has lost another 11 pounds in weight, getting essentially down to her stated optimal weight both subjectively as well as from an medical standpoint.  She is having some mild positional lightheadedness which likely reflects her ongoing significant weight loss.  In 2/2025 Zepbound 7.5 mg was reduced to 5 mg subcu weekly, having another 5 pounds of weight loss over the subsequent month, representing greater than 200 pound weight loss since  undergoing sleeve gastrectomy in 2020.  She had elected to continue the Zepbound 5 mg subcu weekly at that point rather than reducing the dose.  She has lost another 10 pounds in weight in the last 3 months with her BMI now at 24.6, we discussed options of completely discontinuing the Zepbound versus a reduction down to the 2.5 mg subcu weekly maintenance therapy, patient electing for the latter option.  She will continue healthy diet and we will reassess in another 3 months.  I did advise if she continues to lose significant amounts of weight however, that she should stop the Zepbound and advise me accordingly..  Does get additional weight loss benefit taking Topamax currently 75 mg nightly for migraine suppression, with plan to increase to 100 mg nightly.    Orders:  -     Tirzepatide-Weight Management (ZEPBOUND) 2.5 MG/0.5ML solution auto-injector; Inject 0.5 mL under the skin into the appropriate area as directed 1 (One) Time Per Week.  Dispense: 2 mL; Refill: 2    2. Migraine with aura and without status migrainosus, not intractable  Assessment & Plan:  Longstanding history of migraine headaches with prior Topamax suppressive therapy having been discontinued during her most recent pregnancy over a year ago.  She was started back on Topamax suppression in 12/2024, 3 months ago having had an increase in the dose 50 mg to 75 mg nightly.  She still notes having weekly headaches causing nausea, uncertain whether or not it really has improved her severity of pain.  Increase Topamax to 100 mg nightly, reassessing clinical response in the next 3 months.  If not benefiting any further, we will look at other options including CGRP receptor antagonist.        Orders:  -     ondansetron ODT (ZOFRAN-ODT) 4 MG disintegrating tablet; Place 1 tablet on the tongue Every 8 (Eight) Hours As Needed for Vomiting or Nausea.  Dispense: 20 tablet; Refill: 0    3. Nausea  Assessment & Plan:  Does get nausea when she gets her migraine  headaches typically on a weekly basis for which we will refill Zofran, and for which we are also increasing her Topamax from 75 mg up to 100 mg nightly    Orders:  -     ondansetron ODT (ZOFRAN-ODT) 4 MG disintegrating tablet; Place 1 tablet on the tongue Every 8 (Eight) Hours As Needed for Vomiting or Nausea.  Dispense: 20 tablet; Refill: 0    4. Other iron deficiency anemia  Assessment & Plan:  History of menorrhagia with secondary iron deficiency with with no evidence of B12 or folic acid deficiency per lab testing in 12/2024, originally had a hemoglobin of 8.4 in 8/2024, and 10.9 in 12/2024, 11.8 in 3/2025, now normalized at 14.5 in 7/2025.  This is benefited by her iron supplementation as well as her Mirena which has reduced the amount of menstrual flow though has increased slightly the length of her periods.  Plan discontinue her iron sulfate continue healthy iron rich diet and will repeat another hemoglobin A1c in 3 months at her follow-up visit..     Orders:  -     POC Hemoglobin    Other orders  -     Discontinue: topiramate (Topamax) 50 MG tablet; Take 1.5 tablets by mouth Every Night.  Dispense: 135 tablet; Refill: 3  -     topiramate (Topamax) 50 MG tablet; Take 2 tablets by mouth Every Night.  Dispense: 135 tablet; Refill: 3        Vaccine Counseling:      Follow Up:   Return in about 3 months (around 10/14/2025) for Recheck.      At [facility], we believe that sharing information builds trust and better relationships. You are receiving this note because you recently visited [facility]. It is possible you will see health information before a provider has talked with you about it. This kind of information can be easy to misunderstand. To help you fully understand what it means for your health, we urge you to discuss this note with your provider.    Harley Rodriguez MD  [facility] [place]

## 2025-07-14 NOTE — ASSESSMENT & PLAN NOTE
History of menorrhagia with secondary iron deficiency with with no evidence of B12 or folic acid deficiency per lab testing in 12/2024, originally had a hemoglobin of 8.4 in 8/2024, and 10.9 in 12/2024, 11.8 in 3/2025, now normalized at 14.5 in 7/2025.  This is benefited by her iron supplementation as well as her Mirena which has reduced the amount of menstrual flow though has increased slightly the length of her periods.  Plan discontinue her iron sulfate continue healthy iron rich diet and will repeat another hemoglobin A1c in 3 months at her follow-up visit..

## 2025-07-14 NOTE — ASSESSMENT & PLAN NOTE
Status post sleeve gastrectomy in 8/2020, having lost per her account well over 100 pounds at that time, subsequently having regained 40 pounds of weight.  Started in 11/2024 on Zepbound 2.5 mg subcu weekly, most recently months ago having an increase in her Zepbound from 5 mg to 7.5 mg subcu weekly, also getting benefit from Topamax prescribed for migraine suppression..  She has lost another 11 pounds in weight, getting essentially down to her stated optimal weight both subjectively as well as from an medical standpoint.  She is having some mild positional lightheadedness which likely reflects her ongoing significant weight loss.  In 2/2025 Zepbound 7.5 mg was reduced to 5 mg subcu weekly, having another 5 pounds of weight loss over the subsequent month, representing greater than 200 pound weight loss since undergoing sleeve gastrectomy in 2020.  She had elected to continue the Zepbound 5 mg subcu weekly at that point rather than reducing the dose.  She has lost another 10 pounds in weight in the last 3 months with her BMI now at 24.6, we discussed options of completely discontinuing the Zepbound versus a reduction down to the 2.5 mg subcu weekly maintenance therapy, patient electing for the latter option.  She will continue healthy diet and we will reassess in another 3 months.  I did advise if she continues to lose significant amounts of weight however, that she should stop the Zepbound and advise me accordingly..  Does get additional weight loss benefit taking Topamax currently 75 mg nightly for migraine suppression, with plan to increase to 100 mg nightly.

## 2025-07-14 NOTE — ASSESSMENT & PLAN NOTE
Longstanding history of migraine headaches with prior Topamax suppressive therapy having been discontinued during her most recent pregnancy over a year ago.  She was started back on Topamax suppression in 12/2024, 3 months ago having had an increase in the dose 50 mg to 75 mg nightly.  She still notes having weekly headaches causing nausea, uncertain whether or not it really has improved her severity of pain.  Increase Topamax to 100 mg nightly, reassessing clinical response in the next 3 months.  If not benefiting any further, we will look at other options including CGRP receptor antagonist.

## (undated) DEVICE — UNDERGLV SURG BIOGEL INDICATOR LF PF 7.5

## (undated) DEVICE — GLV SURG SENSICARE MICRO PF LF 7 STRL

## (undated) DEVICE — Device

## (undated) DEVICE — BNDR ABD PREMIUM/UNIV 10IN 27TO48IN

## (undated) DEVICE — GLV SURG BIOGEL LTX PF 6 1/2

## (undated) DEVICE — MAT PREVALON MOBL TRANSFR AIR WO/PAD 39X80IN

## (undated) DEVICE — INTENDED FOR TISSUE SEPARATION, AND OTHER PROCEDURES THAT REQUIRE A SHARP SURGICAL BLADE TO PUNCTURE OR CUT.: Brand: BARD-PARKER ® SAFETYLOCK CARBON RIB-BACK BLADES

## (undated) DEVICE — COVER,LIGHT HANDLE,FLX,1/PK: Brand: MEDLINE INDUSTRIES, INC.

## (undated) DEVICE — PK C/SECT 10

## (undated) DEVICE — DRSNG WND BORDR/ADHS NONADHR/GZ LF 2X2IN STRL

## (undated) DEVICE — ADHS SKIN PREMIERPRO EXOFIN TOPICAL HI/VISC .5ML

## (undated) DEVICE — NDL HYPO ECLPS SFTY 25G 1 1/2IN

## (undated) DEVICE — DRAPE,TOP,102X53,STERILE: Brand: MEDLINE

## (undated) DEVICE — GLV SURG SENSICARE MICRO PF LF 6 STRL

## (undated) DEVICE — SUT MNCRYL PLS ANTIB UD 4/0 PS2 18IN

## (undated) DEVICE — TRY SPINE BLCK WHITACRE 25G 3X5IN

## (undated) DEVICE — LEX GENERAL ABDOMINAL SPLIT: Brand: MEDLINE INDUSTRIES, INC.

## (undated) DEVICE — SHEET,DRAPE,53X77,STERILE: Brand: MEDLINE

## (undated) DEVICE — SUT ETHIB 0/0 MO6 I8IN CX45D

## (undated) DEVICE — JACKSON-PRATT 100CC BULB RESERVOIR: Brand: CARDINAL HEALTH

## (undated) DEVICE — SUT MNCRYL PLS ANTIB UD 3/0 PS2 27IN

## (undated) DEVICE — ANTIBACTERIAL UNDYED BRAIDED (POLYGLACTIN 910), SYNTHETIC ABSORBABLE SUTURE: Brand: COATED VICRYL

## (undated) DEVICE — TBG SXN LIPECTOMY 108IN

## (undated) DEVICE — LINER CANSTR SXN HERCULES

## (undated) DEVICE — ELECTRD BLD EXT EDGE/INSUL 1P 4IN

## (undated) DEVICE — SOL IRR H2O BTL 1000ML STRL

## (undated) DEVICE — COATED VICRYL  (POLYGLACTIN 910) SUTURE, VIOLET BRAIDED, STERILE, SYNTHETIC ABSORBABLE SUTURE: Brand: COATED VICRYL

## (undated) DEVICE — SUT ETHLN 0 LP XLH 72IN D5854

## (undated) DEVICE — SUT SILK 2/0 PS 18IN 1588H

## (undated) DEVICE — SHEET, DRAPE, SPLIT, STERILE: Brand: MEDLINE

## (undated) DEVICE — PAD ARMBRD SURG CONVOL 7.5X20X2IN

## (undated) DEVICE — SUT VIC 2/0 CT1 27IN J339H BX/36

## (undated) DEVICE — ELECTRD BLD EDGE/INSUL1P 2.4X5.1MM STRL

## (undated) DEVICE — HOOK LOCK LATEX FREE ELASTIC BANDAGE D/L 6INX10YD

## (undated) DEVICE — PROXIMATE RH ROTATING HEAD SKIN STAPLERS (35 WIDE) CONTAINS 35 STAINLESS STEEL STAPLES: Brand: PROXIMATE

## (undated) DEVICE — BNDG ELAS CO-FLEX SLF ADHR 4IN5YD LF STRL

## (undated) DEVICE — SPNG LAP PREWSH SFTPK 18X18IN STRL PK/5

## (undated) DEVICE — SOL IRR NACL 0.9PCT BT 1000ML

## (undated) DEVICE — SYR CONTRL LUERLOK 10CC

## (undated) DEVICE — BLAKE SILICONE DRAIN, 15 FR ROUND, HUBLESS WITH 3/16" TROCAR: Brand: BLAKE

## (undated) DEVICE — INTENDED USE FOR SURGICAL MARKING ON INTACT SKIN, ALSO PROVIDES A PERMANENT METHOD OF IDENTIFYING OBJECTS IN THE OPERATING ROOM: Brand: WRITESITE® REGULAR TIP SKIN MARKER

## (undated) DEVICE — SUT GUT PLN FAST ABS 5/0 PC1 18IN 1915G

## (undated) DEVICE — SUT PLAIN  3/0 CT1 27IN 842H

## (undated) DEVICE — SUT GUT CHRM 1 CTX 36IN 905H

## (undated) DEVICE — SUT ETHLN 3/0 PC5 18IN 1893G

## (undated) DEVICE — GLV SURG SENSICARE MICRO PF LF 6.5 STRL

## (undated) DEVICE — STCKNT IMPERV 12IN STRL

## (undated) DEVICE — APPL CHLORAPREP W/TINT 26ML BLU

## (undated) DEVICE — SKIN AFFIX SURG ADHESIVE 72/CS 0.55ML: Brand: MEDLINE

## (undated) DEVICE — BANDAGE,GAUZE,BULKEE II,4.5"X4.1YD,STRL: Brand: MEDLINE